# Patient Record
Sex: FEMALE | Race: WHITE | NOT HISPANIC OR LATINO | Employment: OTHER | URBAN - METROPOLITAN AREA
[De-identification: names, ages, dates, MRNs, and addresses within clinical notes are randomized per-mention and may not be internally consistent; named-entity substitution may affect disease eponyms.]

---

## 2017-01-04 ENCOUNTER — GENERIC CONVERSION - ENCOUNTER (OUTPATIENT)
Dept: OTHER | Facility: OTHER | Age: 57
End: 2017-01-04

## 2017-01-04 ENCOUNTER — ALLSCRIPTS OFFICE VISIT (OUTPATIENT)
Dept: OTHER | Facility: OTHER | Age: 57
End: 2017-01-04

## 2017-01-04 DIAGNOSIS — N93.9 ABNORMAL UTERINE AND VAGINAL BLEEDING, UNSPECIFIED: ICD-10-CM

## 2017-01-04 DIAGNOSIS — Z12.31 ENCOUNTER FOR SCREENING MAMMOGRAM FOR MALIGNANT NEOPLASM OF BREAST: ICD-10-CM

## 2017-01-05 ENCOUNTER — GENERIC CONVERSION - ENCOUNTER (OUTPATIENT)
Dept: OTHER | Facility: OTHER | Age: 57
End: 2017-01-05

## 2017-01-09 LAB
ADEQUACY: (HISTORICAL): NORMAL
CLINICIAN PROVIDIED ICD 9 OR 10 (HISTORICAL): NORMAL
COMMENT (HISTORICAL): NORMAL
DIAGNOSIS (HISTORICAL): NORMAL
NOTE: (HISTORICAL): NORMAL
PERFORMED BY (HISTORICAL): NORMAL
REFLEX (HISTORICAL): NORMAL

## 2017-01-12 ENCOUNTER — GENERIC CONVERSION - ENCOUNTER (OUTPATIENT)
Dept: OTHER | Facility: OTHER | Age: 57
End: 2017-01-12

## 2017-01-21 ENCOUNTER — HOSPITAL ENCOUNTER (EMERGENCY)
Facility: HOSPITAL | Age: 57
Discharge: HOME | End: 2017-01-21
Attending: EMERGENCY MEDICINE | Admitting: EMERGENCY MEDICINE
Payer: COMMERCIAL

## 2017-01-21 ENCOUNTER — APPOINTMENT (EMERGENCY)
Dept: RADIOLOGY | Facility: HOSPITAL | Age: 57
End: 2017-01-21
Payer: COMMERCIAL

## 2017-01-21 VITALS
SYSTOLIC BLOOD PRESSURE: 143 MMHG | RESPIRATION RATE: 16 BRPM | DIASTOLIC BLOOD PRESSURE: 79 MMHG | OXYGEN SATURATION: 97 % | HEART RATE: 74 BPM | WEIGHT: 200 LBS | TEMPERATURE: 98.7 F

## 2017-01-21 DIAGNOSIS — F41.0 PANIC ATTACK: ICD-10-CM

## 2017-01-21 DIAGNOSIS — R00.2 PALPITATIONS: Primary | ICD-10-CM

## 2017-01-21 LAB
ANION GAP SERPL CALCULATED.3IONS-SCNC: 14 MMOL/L (ref 4–13)
BASOPHILS # BLD AUTO: 0.03 THOUSANDS/ΜL (ref 0–0.1)
BASOPHILS NFR BLD AUTO: 1 % (ref 0–1)
BUN SERPL-MCNC: 12 MG/DL (ref 5–25)
CALCIUM SERPL-MCNC: 9.8 MG/DL (ref 8.3–10.1)
CHLORIDE SERPL-SCNC: 104 MMOL/L (ref 100–108)
CO2 SERPL-SCNC: 24 MMOL/L (ref 21–32)
CREAT SERPL-MCNC: 0.83 MG/DL (ref 0.6–1.3)
EOSINOPHIL # BLD AUTO: 0.06 THOUSAND/ΜL (ref 0–0.61)
EOSINOPHIL NFR BLD AUTO: 1 % (ref 0–6)
ERYTHROCYTE [DISTWIDTH] IN BLOOD BY AUTOMATED COUNT: 13.9 % (ref 11.6–15.1)
GFR SERPL CREATININE-BSD FRML MDRD: >60 ML/MIN/1.73SQ M
GLUCOSE SERPL-MCNC: 96 MG/DL (ref 65–140)
HCT VFR BLD AUTO: 42.2 % (ref 34.8–46.1)
HGB BLD-MCNC: 13.9 G/DL (ref 11.5–15.4)
LYMPHOCYTES # BLD AUTO: 2.86 THOUSANDS/ΜL (ref 0.6–4.47)
LYMPHOCYTES NFR BLD AUTO: 46 % (ref 14–44)
MCH RBC QN AUTO: 29.6 PG (ref 26.8–34.3)
MCHC RBC AUTO-ENTMCNC: 32.9 G/DL (ref 31.4–37.4)
MCV RBC AUTO: 90 FL (ref 82–98)
MONOCYTES # BLD AUTO: 0.54 THOUSAND/ΜL (ref 0.17–1.22)
MONOCYTES NFR BLD AUTO: 9 % (ref 4–12)
NEUTROPHILS # BLD AUTO: 2.62 THOUSANDS/ΜL (ref 1.85–7.62)
NEUTS SEG NFR BLD AUTO: 43 % (ref 43–75)
PLATELET # BLD AUTO: 275 THOUSANDS/UL (ref 149–390)
PMV BLD AUTO: 10.2 FL (ref 8.9–12.7)
POTASSIUM SERPL-SCNC: 3.7 MMOL/L (ref 3.5–5.3)
RBC # BLD AUTO: 4.7 MILLION/UL (ref 3.81–5.12)
SODIUM SERPL-SCNC: 142 MMOL/L (ref 136–145)
TROPONIN I SERPL-MCNC: <0.02 NG/ML
WBC # BLD AUTO: 6.11 THOUSAND/UL (ref 4.31–10.16)

## 2017-01-21 PROCEDURE — 36415 COLL VENOUS BLD VENIPUNCTURE: CPT | Performed by: EMERGENCY MEDICINE

## 2017-01-21 PROCEDURE — 84484 ASSAY OF TROPONIN QUANT: CPT | Performed by: EMERGENCY MEDICINE

## 2017-01-21 PROCEDURE — 80048 BASIC METABOLIC PNL TOTAL CA: CPT | Performed by: EMERGENCY MEDICINE

## 2017-01-21 PROCEDURE — 93005 ELECTROCARDIOGRAM TRACING: CPT | Performed by: EMERGENCY MEDICINE

## 2017-01-21 PROCEDURE — 71020 HB CHEST X-RAY 2VW FRONTAL&LATL: CPT

## 2017-01-21 PROCEDURE — 85025 COMPLETE CBC W/AUTO DIFF WBC: CPT | Performed by: EMERGENCY MEDICINE

## 2017-01-21 PROCEDURE — 99285 EMERGENCY DEPT VISIT HI MDM: CPT

## 2017-01-21 RX ORDER — ALPRAZOLAM 1 MG/1
1 TABLET ORAL
COMMUNITY
Start: 2016-06-24 | End: 2018-06-21

## 2017-01-21 RX ORDER — LEVOTHYROXINE SODIUM 0.1 MG/1
100 TABLET ORAL DAILY
COMMUNITY
End: 2018-02-16 | Stop reason: SDUPTHER

## 2017-01-23 ENCOUNTER — HOSPITAL ENCOUNTER (OUTPATIENT)
Dept: ULTRASOUND IMAGING | Facility: HOSPITAL | Age: 57
Discharge: HOME | End: 2017-01-23
Payer: COMMERCIAL

## 2017-01-23 DIAGNOSIS — N93.9 ABNORMAL UTERINE AND VAGINAL BLEEDING, UNSPECIFIED: ICD-10-CM

## 2017-01-23 LAB
ATRIAL RATE: 71 BPM
P AXIS: 39 DEGREES
PR INTERVAL: 156 MS
QRS AXIS: 85 DEGREES
QRSD INTERVAL: 96 MS
QT INTERVAL: 398 MS
QTC INTERVAL: 432 MS
T WAVE AXIS: 70 DEGREES
VENTRICULAR RATE: 71 BPM

## 2017-01-23 PROCEDURE — 76830 TRANSVAGINAL US NON-OB: CPT

## 2017-01-23 PROCEDURE — 76856 US EXAM PELVIC COMPLETE: CPT

## 2017-04-26 ENCOUNTER — TRANSCRIBE ORDERS (OUTPATIENT)
Dept: ADMINISTRATIVE | Facility: HOSPITAL | Age: 57
End: 2017-04-26

## 2017-04-26 ENCOUNTER — ALLSCRIPTS OFFICE VISIT (OUTPATIENT)
Dept: OTHER | Facility: OTHER | Age: 57
End: 2017-04-26

## 2017-04-26 DIAGNOSIS — R94.31 ABNORMAL ELECTROCARDIOGRAM: ICD-10-CM

## 2017-04-26 DIAGNOSIS — R94.31 NONSPECIFIC ABNORMAL ELECTROCARDIOGRAM (ECG) (EKG): Primary | ICD-10-CM

## 2017-05-17 ENCOUNTER — HOSPITAL ENCOUNTER (OUTPATIENT)
Dept: NON INVASIVE DIAGNOSTICS | Facility: CLINIC | Age: 57
Discharge: HOME | End: 2017-05-17
Payer: COMMERCIAL

## 2017-05-17 DIAGNOSIS — R94.31 ABNORMAL ELECTROCARDIOGRAM: ICD-10-CM

## 2017-05-17 DIAGNOSIS — R94.31 NONSPECIFIC ABNORMAL ELECTROCARDIOGRAM (ECG) (EKG): ICD-10-CM

## 2017-05-17 PROCEDURE — 93306 TTE W/DOPPLER COMPLETE: CPT

## 2017-05-17 PROCEDURE — 93226 XTRNL ECG REC<48 HR SCAN A/R: CPT

## 2017-05-17 PROCEDURE — 93225 XTRNL ECG REC<48 HRS REC: CPT

## 2017-05-17 PROCEDURE — 93017 CV STRESS TEST TRACING ONLY: CPT

## 2017-05-22 LAB
CHEST PAIN STATEMENT: NORMAL
MAX DIASTOLIC BP: 78 MMHG
MAX HEART RATE: 155 BPM
MAX PREDICTED HEART RATE: 165 BPM
MAX. SYSTOLIC BP: 157 MMHG
PROTOCOL NAME: NORMAL
REASON FOR TERMINATION: NORMAL
TARGET HR FORMULA: NORMAL
TEST INDICATION: NORMAL
TIME IN EXERCISE PHASE: 609 S

## 2018-01-13 NOTE — RESULT NOTES
Message   pt  aware of all results at [facility], copy of labs given     Verified Results  (1) LIPID PANEL FASTING W DIRECT LDL REFLEX [id number] 03:46PM Lachelle Cruz     Test Name Result Flag Reference   Cholesterol, Total 239 mg/dL H 100-199   Triglycerides 206 mg/dL H 0-149   HDL Cholesterol 44 mg/dL  >39   According to ATP-III Guidelines, HDL-C >59 mg/dL is considered a  negative risk factor for CHD     LDL Cholesterol Calc 154 mg/dL H 0-99     (1) COMPREHENSIVE METABOLIC PANEL [id number] 69:69QH [employer]     Test Name Result Flag Reference   Glucose, Serum 105 mg/dL H 65-99   BUN 11 mg/dL  6-24   Creatinine, Serum 0 73 mg/dL  0 57-1 00   eGFR If NonAfricn Am 94 mL/min/1 73  >59   eGFR If Africn Am 108 mL/min/1 73  >59   BUN/Creatinine Ratio 15  9-23   Sodium, Serum 142 mmol/L  134-144   Potassium, Serum 4 0 mmol/L  3 5-5 2   Chloride, Serum 103 mmol/L     Carbon Dioxide, Total 24 mmol/L  18-29   Calcium, Serum 9 7 mg/dL  8 7-10 2   Protein, Total, Serum 7 2 g/dL  6 0-8 5   Albumin, Serum 4 3 g/dL  3 5-5 5   Globulin, Total 2 9 g/dL  1 5-4 5   A/G Ratio 1 5  1 1-2 5   Bilirubin, Total 0 9 mg/dL  0 0-1 2   Alkaline Phosphatase, S 105 IU/L     AST (SGOT) 22 IU/L  0-40   ALT (SGPT) 22 IU/L  0-32     (1) TSH [id number] 03:46PM Cathy Rincon     Test Name Result Flag Reference   TSH 0 181 uIU/mL L 0 450-4 500

## 2018-01-14 VITALS
HEIGHT: 64 IN | HEART RATE: 82 BPM | OXYGEN SATURATION: 98 % | BODY MASS INDEX: 35.51 KG/M2 | DIASTOLIC BLOOD PRESSURE: 84 MMHG | WEIGHT: 208 LBS | SYSTOLIC BLOOD PRESSURE: 118 MMHG

## 2018-01-14 VITALS
SYSTOLIC BLOOD PRESSURE: 110 MMHG | HEART RATE: 90 BPM | DIASTOLIC BLOOD PRESSURE: 80 MMHG | WEIGHT: 208 LBS | BODY MASS INDEX: 35.51 KG/M2 | HEIGHT: 64 IN | RESPIRATION RATE: 16 BRPM

## 2018-01-16 NOTE — RESULT NOTES
Message   labs abnormal, should make OV to discuss  blood sugar higher than last year, 3 month average blood sugar is prediabetes range   TSH is high at 5, needs medication adjusted     Verified Results  [facility]) CMP14+eGFR [id number] 02:58PM [employer]     Test Name Result Flag Reference   Glucose, Serum 132 mg/dL H 65-99   BUN 12 mg/dL  6-24   Creatinine, Serum 0 91 mg/dL  0 57-1 00   eGFR If NonAfricn Am 71 mL/min/1 73  >59   eGFR If Africn Am 82 mL/min/1 73  >59   BUN/Creatinine Ratio 13  9-23   Sodium, Serum 141 mmol/L  134-144   Potassium, Serum 4 0 mmol/L  3 5-5 2   Chloride, Serum 100 mmol/L     Carbon Dioxide, Total 21 mmol/L  18-29   Calcium, Serum 9 7 mg/dL  8 7-10 2   Protein, Total, Serum 7 6 g/dL  6 0-8 5   Albumin, Serum 4 7 g/dL  3 5-5 5   Globulin, Total 2 9 g/dL  1 5-4 5   A/G Ratio 1 6  1 1-2 5   Bilirubin, Total 0 8 mg/dL  0 0-1 2   Alkaline Phosphatase, S 100 IU/L     AST (SGOT) 30 IU/L  0-40   ALT (SGPT) 32 IU/L  0-32     (LC) Lipid Panel [id number] 02:58PM [employer]     Test Name Result Flag Reference   Cholesterol, Total 237 mg/dL H 100-199   Triglycerides 210 mg/dL H 0-149   HDL Cholesterol 49 mg/dL  >39   VLDL Cholesterol Ifeanyi 42 mg/dL H 5-40   LDL Cholesterol Calc 146 mg/dL H 0-99     (LC) Thyroid Panel With TSH [id number] 02:58PM [employer]     Test Name Result Flag Reference   TSH 5 080 uIU/mL H 0 450-4 500   Thyroxine (T4) 8 5 ug/dL  4 5-12 0   T3 Uptake 24 %  24-39   Free Thyroxine Index 2 0  1 2-4 9     (1) HEMOGLOBIN A1C [id number] 02:58PM Cathy Rincon     Test Name Result Flag Reference   Hemoglobin A1c 6 1 % H 4 8-5 6   Pre-diabetes: 5 7 - 6 4           Diabetes: >6 4           Glycemic control for adults with diabetes: <7 0

## 2018-01-17 NOTE — RESULT NOTES
Message   normal pap smear     Verified Results  ([facility]) Pap IG, rfx HPV ASCU [id number] 12:00AM [employer]     Test Name Result Flag Reference   DIAGNOSIS: Comment     NEGATIVE FOR INTRAEPITHELIAL LESION AND MALIGNANCY  THE CYTOLOGY PROCESSING WAS PERFORMED AT THE [facility] LOCATED AT  [facility], [address] [address]  Specimen adequacy: Comment     Satisfactory for evaluation  Endocervical and/or squamous metaplastic  cells (endocervical component) are present  Clinician provided ICD10: Comment     [id number]   Performed by: Marcio Gonzalez, Cytotechnologist (ASCP)     Clarisse Olmedo Note: Comment     The Pap smear is a screening test designed to aid in the detection of  premalignant and malignant conditions of the uterine cervix  It is not a  diagnostic procedure and should not be used as the sole means of detecting  cervical cancer  Both false-positive and false-negative reports do occur    Comment     The HPV DNA reflex criteria were not met with this specimen result  therefore, no HPV testing was performed

## 2018-02-09 ENCOUNTER — OFFICE VISIT (OUTPATIENT)
Dept: FAMILY MEDICINE CLINIC | Facility: CLINIC | Age: 58
End: 2018-02-09
Payer: COMMERCIAL

## 2018-02-09 VITALS
OXYGEN SATURATION: 95 % | TEMPERATURE: 97.9 F | SYSTOLIC BLOOD PRESSURE: 122 MMHG | WEIGHT: 200 LBS | BODY MASS INDEX: 34.15 KG/M2 | HEART RATE: 60 BPM | DIASTOLIC BLOOD PRESSURE: 72 MMHG | HEIGHT: 64 IN

## 2018-02-09 DIAGNOSIS — E03.9 HYPOTHYROIDISM, UNSPECIFIED TYPE: ICD-10-CM

## 2018-02-09 DIAGNOSIS — J01.00 ACUTE MAXILLARY SINUSITIS, RECURRENCE NOT SPECIFIED: Primary | ICD-10-CM

## 2018-02-09 PROCEDURE — 99213 OFFICE O/P EST LOW 20 MIN: CPT | Performed by: NURSE PRACTITIONER

## 2018-02-09 RX ORDER — AMOXICILLIN AND CLAVULANATE POTASSIUM 875; 125 MG/1; MG/1
1 TABLET, FILM COATED ORAL EVERY 12 HOURS SCHEDULED
Qty: 20 TABLET | Refills: 0 | Status: SHIPPED | OUTPATIENT
Start: 2018-02-09 | End: 2018-02-19

## 2018-02-09 NOTE — PROGRESS NOTES
Assessment/Plan:    No problem-specific Assessment & Plan notes found for this encounter  Diagnoses and all orders for this visit:    Acute maxillary sinusitis, recurrence not specified  -     amoxicillin-clavulanate (AUGMENTIN) 875-125 mg per tablet; Take 1 tablet by mouth every 12 (twelve) hours for 10 days    Hypothyroidism, unspecified type  -     TSH, 3rd generation with T4 reflex; Future  -     Lipid panel; Future  -     Comprehensive metabolic panel; Future  -     TSH, 3rd generation with T4 reflex  -     Lipid panel  -     Comprehensive metabolic panel        Patient Instructions   Discussed viral vs bacterial infection  RX medication as directed  Continue OTC cough/cold medications as directed  Call or return for problems/concerns            Subjective:      Patient ID: Joaquina Watkins is a 65 y o  female  Sinus congestion, fever 102, cough, wheezing x 2 week  The following portions of the patient's history were reviewed and updated as appropriate: allergies, current medications, past family history, past medical history, past social history, past surgical history and problem list     Review of Systems   Constitutional: Positive for activity change, chills, fatigue and fever  HENT: Positive for congestion, ear pain, postnasal drip, rhinorrhea, sinus pressure and sore throat  Eyes: Negative for pain, discharge and redness  Respiratory: Positive for cough  Negative for wheezing  Cardiovascular: Negative for chest pain  Gastrointestinal: Negative for constipation, diarrhea, nausea and vomiting  Musculoskeletal: Positive for myalgias  Skin: Negative for rash  Neurological: Positive for headaches  Negative for dizziness  Objective:    Vitals:    02/09/18 1110   BP: 122/72   Pulse: 60   Temp: 97 9 °F (36 6 °C)   SpO2: 95%        Physical Exam   Constitutional: She is oriented to person, place, and time  She appears well-developed and well-nourished  No distress     HENT: Head: Normocephalic and atraumatic  Right Ear: Tympanic membrane, external ear and ear canal normal    Left Ear: Tympanic membrane, external ear and ear canal normal    Nose: Rhinorrhea present  No epistaxis  Mouth/Throat: Uvula is midline, oropharynx is clear and moist and mucous membranes are normal    Cardiovascular: Normal rate, regular rhythm and normal heart sounds  Exam reveals no gallop and no friction rub  No murmur heard  Pulmonary/Chest: Effort normal and breath sounds normal  No respiratory distress  She has no wheezes  She has no rales  Cough noted   Abdominal: She exhibits no distension  There is no tenderness  Lymphadenopathy:        Head (right side): No submandibular adenopathy present  Head (left side): No submandibular adenopathy present  Neurological: She is alert and oriented to person, place, and time  Skin: She is not diaphoretic  Psychiatric: She has a normal mood and affect  Her behavior is normal  Judgment and thought content normal    Nursing note and vitals reviewed

## 2018-02-10 LAB
ALBUMIN SERPL-MCNC: 4.5 G/DL (ref 3.5–5.5)
ALBUMIN/GLOB SERPL: 1.6 {RATIO} (ref 1.2–2.2)
ALP SERPL-CCNC: 83 IU/L (ref 39–117)
ALT SERPL-CCNC: 35 IU/L (ref 0–32)
AST SERPL-CCNC: 32 IU/L (ref 0–40)
BILIRUB SERPL-MCNC: 0.6 MG/DL (ref 0–1.2)
BUN SERPL-MCNC: 10 MG/DL (ref 6–24)
BUN/CREAT SERPL: 13 (ref 9–23)
CALCIUM SERPL-MCNC: 9.7 MG/DL (ref 8.7–10.2)
CHLORIDE SERPL-SCNC: 101 MMOL/L (ref 96–106)
CHOLEST SERPL-MCNC: 247 MG/DL (ref 100–199)
CHOLEST/HDLC SERPL: 7.5 RATIO UNITS (ref 0–4.4)
CO2 SERPL-SCNC: 26 MMOL/L (ref 18–29)
CREAT SERPL-MCNC: 0.78 MG/DL (ref 0.57–1)
GLOBULIN SER-MCNC: 2.8 G/DL (ref 1.5–4.5)
GLUCOSE SERPL-MCNC: 108 MG/DL (ref 65–99)
HDLC SERPL-MCNC: 33 MG/DL
LDLC SERPL CALC-MCNC: 168 MG/DL (ref 0–99)
POTASSIUM SERPL-SCNC: 4.4 MMOL/L (ref 3.5–5.2)
PROT SERPL-MCNC: 7.3 G/DL (ref 6–8.5)
SL AMB EGFR AFRICAN AMERICAN: 98 ML/MIN/1.73
SL AMB EGFR NON AFRICAN AMERICAN: 85 ML/MIN/1.73
SL AMB VLDL CHOLESTEROL CALC: 46 MG/DL (ref 5–40)
SODIUM SERPL-SCNC: 143 MMOL/L (ref 134–144)
TRIGL SERPL-MCNC: 228 MG/DL (ref 0–149)
TSH SERPL DL<=0.005 MIU/L-ACNC: 0.52 UIU/ML (ref 0.45–4.5)

## 2018-02-12 ENCOUNTER — TELEPHONE (OUTPATIENT)
Dept: FAMILY MEDICINE CLINIC | Facility: CLINIC | Age: 58
End: 2018-02-12

## 2018-02-16 ENCOUNTER — OFFICE VISIT (OUTPATIENT)
Dept: FAMILY MEDICINE CLINIC | Facility: CLINIC | Age: 58
End: 2018-02-16
Payer: COMMERCIAL

## 2018-02-16 VITALS
BODY MASS INDEX: 34.49 KG/M2 | HEIGHT: 64 IN | WEIGHT: 202 LBS | SYSTOLIC BLOOD PRESSURE: 122 MMHG | DIASTOLIC BLOOD PRESSURE: 80 MMHG

## 2018-02-16 DIAGNOSIS — E03.9 HYPOTHYROIDISM, UNSPECIFIED TYPE: ICD-10-CM

## 2018-02-16 DIAGNOSIS — E03.9 HYPOTHYROIDISM, UNSPECIFIED TYPE: Primary | ICD-10-CM

## 2018-02-16 DIAGNOSIS — Z00.00 ANNUAL PHYSICAL EXAM: Primary | ICD-10-CM

## 2018-02-16 DIAGNOSIS — E78.00 HYPERCHOLESTEREMIA: ICD-10-CM

## 2018-02-16 DIAGNOSIS — Z12.39 ENCOUNTER FOR BREAST CANCER SCREENING OTHER THAN MAMMOGRAM: ICD-10-CM

## 2018-02-16 PROCEDURE — 99396 PREV VISIT EST AGE 40-64: CPT | Performed by: NURSE PRACTITIONER

## 2018-02-16 RX ORDER — LEVOTHYROXINE SODIUM 0.1 MG/1
100 TABLET ORAL DAILY
Qty: 90 TABLET | Refills: 1 | Status: SHIPPED | OUTPATIENT
Start: 2018-02-16 | End: 2018-06-21

## 2018-02-16 NOTE — PROGRESS NOTES
Assessment/Plan:    No problem-specific Assessment & Plan notes found for this encounter  Diagnoses and all orders for this visit:    Hypothyroidism, unspecified type    Hypercholesteremia    Encounter for breast cancer screening other than mammogram  -     Mammo screening bilateral w cad; Future        Patient Instructions   Refusing cholesterol medication  Discussed diet to lower cholesterol  Have labs rechecked in 3 months  Script for mammogram given  Call or return with any problems or concerns  Subjective:      Patient ID: Lisette Bess is a 65 y o  female  Here for annual physical and to review labs  The following portions of the patient's history were reviewed and updated as appropriate: allergies, current medications, past family history, past medical history, past social history, past surgical history and problem list     Review of Systems   Constitutional: Negative for activity change, diaphoresis, fatigue and fever  HENT: Negative for congestion, facial swelling, hearing loss, rhinorrhea, sinus pain, sinus pressure, sneezing, sore throat and voice change  Eyes: Negative for discharge and visual disturbance  Respiratory: Negative for cough, choking, chest tightness, shortness of breath, wheezing and stridor  Cardiovascular: Negative for chest pain, palpitations and leg swelling  Gastrointestinal: Negative for abdominal distention, abdominal pain, constipation, diarrhea, nausea and vomiting  Endocrine: Negative for polydipsia, polyphagia and polyuria  Genitourinary: Negative for difficulty urinating, dysuria, frequency and urgency  Musculoskeletal: Negative for arthralgias, back pain, gait problem, joint swelling, myalgias, neck pain and neck stiffness  Skin: Negative for color change, rash and wound  Neurological: Negative for dizziness, syncope, speech difficulty, weakness, light-headedness and headaches  Hematological: Negative for adenopathy   Does not bruise/bleed easily  Psychiatric/Behavioral: Negative for agitation, behavioral problems, confusion, hallucinations, sleep disturbance and suicidal ideas  The patient is not nervous/anxious  Objective:      /80   Ht 5' 4" (1 626 m)   Wt 91 6 kg (202 lb)   BMI 34 67 kg/m²          Physical Exam   Constitutional: She is oriented to person, place, and time  She appears well-developed and well-nourished  No distress  HENT:   Head: Normocephalic and atraumatic  Right Ear: External ear normal    Left Ear: External ear normal    Nose: Nose normal    Mouth/Throat: Oropharynx is clear and moist    Eyes: Conjunctivae and EOM are normal  Pupils are equal, round, and reactive to light  Right eye exhibits no discharge  Neck: Normal range of motion  Neck supple  No tracheal deviation present  No thyromegaly present  Cardiovascular: Normal rate, regular rhythm and normal heart sounds  Exam reveals no gallop and no friction rub  No murmur heard  Pulmonary/Chest: Effort normal and breath sounds normal  No respiratory distress  She has no wheezes  She has no rales  Abdominal: Soft  Bowel sounds are normal  She exhibits no distension and no mass  There is no tenderness  There is no rebound and no guarding  Musculoskeletal: Normal range of motion  She exhibits no edema, tenderness or deformity  Neurological: She is alert and oriented to person, place, and time  No cranial nerve deficit  Coordination normal    Skin: Skin is warm and dry  No rash noted  She is not diaphoretic  No erythema  Psychiatric: She has a normal mood and affect  Her behavior is normal  Judgment and thought content normal    Nursing note and vitals reviewed

## 2018-02-16 NOTE — PATIENT INSTRUCTIONS
Refusing cholesterol medication  Discussed diet to lower cholesterol  Have labs rechecked in 3 months  Script for mammogram given  Call or return with any problems or concerns

## 2018-06-21 ENCOUNTER — OFFICE VISIT (OUTPATIENT)
Dept: URGENT CARE | Facility: CLINIC | Age: 58
End: 2018-06-21
Payer: COMMERCIAL

## 2018-06-21 ENCOUNTER — APPOINTMENT (EMERGENCY)
Dept: RADIOLOGY | Facility: HOSPITAL | Age: 58
End: 2018-06-21
Payer: COMMERCIAL

## 2018-06-21 ENCOUNTER — HOSPITAL ENCOUNTER (EMERGENCY)
Facility: HOSPITAL | Age: 58
Discharge: HOME | End: 2018-06-22
Attending: EMERGENCY MEDICINE | Admitting: EMERGENCY MEDICINE
Payer: COMMERCIAL

## 2018-06-21 VITALS
HEIGHT: 64 IN | BODY MASS INDEX: 34.31 KG/M2 | HEART RATE: 97 BPM | TEMPERATURE: 98.9 F | DIASTOLIC BLOOD PRESSURE: 76 MMHG | WEIGHT: 201 LBS | OXYGEN SATURATION: 98 % | SYSTOLIC BLOOD PRESSURE: 116 MMHG | RESPIRATION RATE: 16 BRPM

## 2018-06-21 DIAGNOSIS — R07.9 CHEST PAIN: Primary | ICD-10-CM

## 2018-06-21 DIAGNOSIS — R07.9 CHEST PAIN, UNSPECIFIED TYPE: Primary | ICD-10-CM

## 2018-06-21 PROBLEM — R94.31 ABNORMAL EKG: Status: ACTIVE | Noted: 2017-04-26

## 2018-06-21 PROBLEM — N93.9 ABNORMAL VAGINAL BLEEDING: Status: ACTIVE | Noted: 2017-01-04

## 2018-06-21 PROBLEM — R73.03 PRE-DIABETES: Status: ACTIVE | Noted: 2017-01-04

## 2018-06-21 PROBLEM — R07.89 CHEST PAIN, NON-CARDIAC: Status: ACTIVE | Noted: 2017-01-12

## 2018-06-21 PROBLEM — R00.2 PALPITATIONS: Status: ACTIVE | Noted: 2017-04-26

## 2018-06-21 LAB
ANION GAP SERPL CALCULATED.3IONS-SCNC: 9 MMOL/L (ref 4–13)
BASOPHILS # BLD AUTO: 0.04 THOUSANDS/ΜL (ref 0–0.1)
BASOPHILS NFR BLD AUTO: 1 % (ref 0–1)
BUN SERPL-MCNC: 11 MG/DL (ref 5–25)
CALCIUM SERPL-MCNC: 9 MG/DL (ref 8.3–10.1)
CHLORIDE SERPL-SCNC: 103 MMOL/L (ref 100–108)
CO2 SERPL-SCNC: 28 MMOL/L (ref 21–32)
CREAT SERPL-MCNC: 0.78 MG/DL (ref 0.6–1.3)
EOSINOPHIL # BLD AUTO: 0.04 THOUSAND/ΜL (ref 0–0.61)
EOSINOPHIL NFR BLD AUTO: 1 % (ref 0–6)
ERYTHROCYTE [DISTWIDTH] IN BLOOD BY AUTOMATED COUNT: 13.1 % (ref 11.6–15.1)
GFR SERPL CREATININE-BSD FRML MDRD: 85 ML/MIN/1.73SQ M
GLUCOSE SERPL-MCNC: 93 MG/DL (ref 65–140)
HCT VFR BLD AUTO: 41.2 % (ref 34.8–46.1)
HGB BLD-MCNC: 13.7 G/DL (ref 11.5–15.4)
IMM GRANULOCYTES # BLD AUTO: 0.02 THOUSAND/UL (ref 0–0.2)
IMM GRANULOCYTES NFR BLD AUTO: 0 % (ref 0–2)
LYMPHOCYTES # BLD AUTO: 2.82 THOUSANDS/ΜL (ref 0.6–4.47)
LYMPHOCYTES NFR BLD AUTO: 42 % (ref 14–44)
MAGNESIUM SERPL-MCNC: 2.3 MG/DL (ref 1.6–2.6)
MCH RBC QN AUTO: 29.9 PG (ref 26.8–34.3)
MCHC RBC AUTO-ENTMCNC: 33.3 G/DL (ref 31.4–37.4)
MCV RBC AUTO: 90 FL (ref 82–98)
MONOCYTES # BLD AUTO: 0.72 THOUSAND/ΜL (ref 0.17–1.22)
MONOCYTES NFR BLD AUTO: 11 % (ref 4–12)
NEUTROPHILS # BLD AUTO: 3.05 THOUSANDS/ΜL (ref 1.85–7.62)
NEUTS SEG NFR BLD AUTO: 45 % (ref 43–75)
NRBC BLD AUTO-RTO: 0 /100 WBCS
PLATELET # BLD AUTO: 340 THOUSANDS/UL (ref 149–390)
PMV BLD AUTO: 10.1 FL (ref 8.9–12.7)
POTASSIUM SERPL-SCNC: 3.8 MMOL/L (ref 3.5–5.3)
RBC # BLD AUTO: 4.58 MILLION/UL (ref 3.81–5.12)
SODIUM SERPL-SCNC: 140 MMOL/L (ref 136–145)
TROPONIN I SERPL-MCNC: <0.02 NG/ML
WBC # BLD AUTO: 6.69 THOUSAND/UL (ref 4.31–10.16)

## 2018-06-21 PROCEDURE — 93010 ELECTROCARDIOGRAM REPORT: CPT | Performed by: INTERNAL MEDICINE

## 2018-06-21 PROCEDURE — 84484 ASSAY OF TROPONIN QUANT: CPT | Performed by: EMERGENCY MEDICINE

## 2018-06-21 PROCEDURE — 99213 OFFICE O/P EST LOW 20 MIN: CPT | Performed by: NURSE PRACTITIONER

## 2018-06-21 PROCEDURE — 80048 BASIC METABOLIC PNL TOTAL CA: CPT | Performed by: EMERGENCY MEDICINE

## 2018-06-21 PROCEDURE — 36415 COLL VENOUS BLD VENIPUNCTURE: CPT | Performed by: EMERGENCY MEDICINE

## 2018-06-21 PROCEDURE — 71045 X-RAY EXAM CHEST 1 VIEW: CPT

## 2018-06-21 PROCEDURE — 85025 COMPLETE CBC W/AUTO DIFF WBC: CPT | Performed by: EMERGENCY MEDICINE

## 2018-06-21 PROCEDURE — 93005 ELECTROCARDIOGRAM TRACING: CPT | Performed by: NURSE PRACTITIONER

## 2018-06-21 PROCEDURE — 96360 HYDRATION IV INFUSION INIT: CPT

## 2018-06-21 PROCEDURE — 96361 HYDRATE IV INFUSION ADD-ON: CPT

## 2018-06-21 PROCEDURE — 83735 ASSAY OF MAGNESIUM: CPT | Performed by: EMERGENCY MEDICINE

## 2018-06-21 RX ORDER — LEVOTHYROXINE SODIUM 0.1 MG/1
1 TABLET ORAL DAILY
COMMUNITY
Start: 2016-06-24 | End: 2018-08-15 | Stop reason: SDUPTHER

## 2018-06-21 RX ORDER — 0.9 % SODIUM CHLORIDE 0.9 %
3 VIAL (ML) INJECTION AS NEEDED
Status: DISCONTINUED | OUTPATIENT
Start: 2018-06-21 | End: 2018-06-22 | Stop reason: HOSPADM

## 2018-06-21 RX ORDER — UBIDECARENONE 75 MG
100 CAPSULE ORAL DAILY
COMMUNITY

## 2018-06-21 RX ORDER — ASPIRIN 81 MG/1
324 TABLET, CHEWABLE ORAL ONCE
Status: COMPLETED | OUTPATIENT
Start: 2018-06-21 | End: 2018-06-21

## 2018-06-21 RX ADMIN — SODIUM CHLORIDE 1000 ML: 0.9 INJECTION, SOLUTION INTRAVENOUS at 20:45

## 2018-06-21 RX ADMIN — ASPIRIN 81 MG 324 MG: 81 TABLET ORAL at 20:45

## 2018-06-21 NOTE — PROGRESS NOTES
NAME: Sandi De Leon is a 65 y o  female  : 1960    MRN: [id number]      Assessment and Plan   Chest pain, unspecified type [R07 9]  1  Chest pain, unspecified type  POCT ECG       Pascale was seen today for chest pain  Diagnoses and all orders for this visit:    Chest pain, unspecified type  -     POCT ECG    ambulance refused,   Pt left with  to [facility] ER   Patient Instructions   Patient Instructions   Patient going to the ER for further evaluation   Multiple symptoms   [facility] ER, private vehicle and refused ambulance,  took pt to the ER  EKG done  ER made aware pt coming  Proceed to ER if symptoms worsen  Chief Complaint     Chief Complaint   Patient presents with    Chest Pain     last week started  Light headed and confused  Started on beet pills  Vomited once today  Pain on right side of neck  History of Present Illness     Pt here today for chest tightness for one week and thinks she may have angina, and was told to take beet pills to help with her symptoms  [facility] gave her the meds and the chest tightness subsided and today when she was driving she felt dizzy and exhausted and felt nauseated and vomited at approximately 3pm  She feels at times it takes a while to get her thoughts together  She is eating and drinking normally and doesn't feel dehydrated and pain was also in the left arm since last week  Today she had pain on the right side of her Jaw and comes and goes  Patient also has a headache and feels tight in the head  She has had jaw pain for the past few hours and comes and goes   Shaina Rubio is going to take the patient to the ER        Review of Systems   Review of Systems   Constitutional: Negative  HENT: Negative  Respiratory: Negative  Cardiovascular: Positive for chest pain  Gastrointestinal: Negative  Genitourinary: Negative  Musculoskeletal: Positive for myalgias  Skin: Negative            Current Medications       Current Outpatient Prescriptions:     cyanocobalamin (VITAMIN B-12) 100 mcg tablet, Take by mouth, Disp: , Rfl:     levothyroxine 100 mcg tablet, Take 1 tablet (100 mcg total) by mouth daily, Disp: 90 tablet, Rfl: 1    Magnesium Oxide 140 MG CAPS, Take by mouth, Disp: , Rfl:     Probiotic Product (PROBIOTIC-10) CAPS, Take by mouth, Disp: , Rfl:     ALPRAZolam (XANAX) 1 mg tablet, Take 1 mg by mouth Pt states she has it but does not take it  , Disp: , Rfl:     levothyroxine 100 mcg tablet, Take 1 tablet by mouth daily, Disp: , Rfl:     Current Allergies     Allergies as of 06/21/2018 - Reviewed 06/21/2018   Allergen Reaction Noted    Sulfa antibiotics Anaphylaxis 06/24/2016              Past Medical History:   Diagnosis Date    Anxiety     Disease of thyroid gland        No past surgical history on file  Family History   Problem Relation Age of Onset    Heart failure Mother     Diabetes Mother     Cancer Father     Pancreatic cancer Father     No Known Problems Maternal Grandmother     No Known Problems Maternal Grandfather     No Known Problems Paternal Grandmother     No Known Problems Paternal Grandfather     Ovarian cancer Sister          Medications have been verified  The following portions of the patient's history were reviewed and updated as appropriate: allergies, current medications, past family history, past medical history, past social history, past surgical history and problem list     Objective   /76   Pulse 97   Temp 98 9 °F (37 2 °C)   Resp 16   Ht 5' 4" (1 626 m)   Wt 91 2 kg (201 lb)   SpO2 98%   BMI 34 50 kg/m²      Physical Exam     Physical Exam   Constitutional: She is oriented to person, place, and time  She appears well-developed and well-nourished  HENT:   Head: Normocephalic  Cardiovascular: Normal rate and regular rhythm      Pulmonary/Chest: Effort normal and breath sounds normal    cP non reproducible    Abdominal:   SNT, BS present, nauseated and vomitied times 1 at 3pm   Neurological: She is alert and oriented to person, place, and time     Feels slow to process thoughts, Headache present         BRIDEGR Lao

## 2018-06-21 NOTE — PATIENT INSTRUCTIONS
Patient going to the ER for further evaluation   Multiple symptoms   [facility] ER, private vehicle and refused ambulance,  took pt to the ER  EKG done  ER made aware pt coming

## 2018-06-22 VITALS
TEMPERATURE: 98.1 F | SYSTOLIC BLOOD PRESSURE: 107 MMHG | RESPIRATION RATE: 19 BRPM | HEART RATE: 69 BPM | OXYGEN SATURATION: 93 % | BODY MASS INDEX: 34.33 KG/M2 | DIASTOLIC BLOOD PRESSURE: 64 MMHG | WEIGHT: 200 LBS

## 2018-06-22 LAB
ATRIAL RATE: 68 BPM
ATRIAL RATE: 75 BPM
ATRIAL RATE: 84 BPM
P AXIS: 33 DEGREES
P AXIS: 37 DEGREES
P AXIS: 48 DEGREES
PR INTERVAL: 156 MS
PR INTERVAL: 158 MS
PR INTERVAL: 172 MS
QRS AXIS: 20 DEGREES
QRS AXIS: 67 DEGREES
QRS AXIS: 72 DEGREES
QRSD INTERVAL: 92 MS
QRSD INTERVAL: 94 MS
QRSD INTERVAL: 94 MS
QT INTERVAL: 384 MS
QT INTERVAL: 402 MS
QT INTERVAL: 406 MS
QTC INTERVAL: 427 MS
QTC INTERVAL: 453 MS
QTC INTERVAL: 453 MS
T WAVE AXIS: 11 DEGREES
T WAVE AXIS: 13 DEGREES
T WAVE AXIS: 22 DEGREES
TROPONIN I SERPL-MCNC: <0.02 NG/ML
VENTRICULAR RATE: 68 BPM
VENTRICULAR RATE: 75 BPM
VENTRICULAR RATE: 84 BPM

## 2018-06-22 PROCEDURE — 99284 EMERGENCY DEPT VISIT MOD MDM: CPT

## 2018-06-22 NOTE — ED NOTES
Provider informed about troponin results  Pt informed as well       Jayne Hastings RN  06/22/18 8230

## 2018-06-22 NOTE — ED NOTES
IV fluids remain running  Pt pleasant upon approach  Pt comfortable and denies pain at this time  VSS  Call bell within reach       Tomás Vilchis RN  06/21/18 5766

## 2018-06-22 NOTE — ED NOTES
Pt pleasant  Pt aware of plan of care  Waiting for troponin result  VSS stable  No complaints       Tomás Vilchis RN  06/22/18 3414

## 2018-06-22 NOTE — ED NOTES
Patient states, "I feel much better now" Patient watching  Videos on phone in bed, Daquan Leonard, RN  06/21/18 4111

## 2018-06-22 NOTE — ED PROVIDER NOTES
History  Chief Complaint   Patient presents with    Dizziness     Developed dizziness and nausea while driving; vomited once  Went to Urgent Care and was sent to ED  Has right sided neck pain also; feels very tired  States had chest pain last week but did not see doctor  States went to [employer] [facility] and was advised to take red beet pills for angina  Patient complains of sternal chest pain tight intermittent since she was 30years old  Usually symptoms happen after drinking vodka with orange juice and last a few minutes resolve on its own  She had symptoms today while driving several hours ago after drinking green smoothie  Then she got lightheaded and nausea/ vomited once  She went to urgent care and they referred her here  Patient has right anterior neck pain now  Poor historian  Reviewed records - normal stress, echo and holter monitor 5/17  Prior to Admission Medications   Prescriptions Last Dose Informant Patient Reported? Taking? Magnesium Oxide 140 MG CAPS   Yes No   Sig: Take 140 mg by mouth daily     Probiotic Product (PROBIOTIC-10) CAPS   Yes No   Sig: Take 1 tablet by mouth daily     cyanocobalamin (VITAMIN B-12) 100 mcg tablet   Yes No   Sig: Take 100 mcg by mouth daily     levothyroxine 100 mcg tablet   Yes No   Sig: Take 1 tablet by mouth daily      Facility-Administered Medications: None       Past Medical History:   Diagnosis Date    Anxiety     Disease of thyroid gland        History reviewed  No pertinent surgical history  Family History   Problem Relation Age of Onset    Heart failure Mother     Diabetes Mother     Cancer Father     Pancreatic cancer Father     No Known Problems Maternal Grandmother     No Known Problems Maternal Grandfather     No Known Problems Paternal Grandmother     No Known Problems Paternal Grandfather     Ovarian cancer Sister      I have reviewed and agree with the history as documented      Social History   Substance Use Topics    Smoking status: Never Smoker    Smokeless tobacco: Never Used    Alcohol use Yes      Comment: socially        Review of Systems   All other systems reviewed and are negative  Physical Exam  Physical Exam   Constitutional: She is oriented to person, place, and time  She appears well-developed and well-nourished  HENT:   Mouth/Throat: Oropharynx is clear and moist    Eyes: Conjunctivae and EOM are normal  Pupils are equal, round, and reactive to light  Neck: Normal range of motion  Neck supple  No spinous process tenderness present  Cardiovascular: Normal rate, regular rhythm, normal heart sounds and intact distal pulses  Pulmonary/Chest: Effort normal and breath sounds normal  No respiratory distress  She has no wheezes  She exhibits no tenderness  Abdominal: Soft  Bowel sounds are normal  She exhibits no distension  There is no tenderness  Musculoskeletal: Normal range of motion  Neurological: She is alert and oriented to person, place, and time  She has normal strength  No sensory deficit  GCS eye subscore is 4  GCS verbal subscore is 5  GCS motor subscore is 6  Skin: Skin is warm and dry  No rash noted  Psychiatric: She has a normal mood and affect  Nursing note and vitals reviewed        Vital Signs  ED Triage Vitals [06/21/18 2009]   Temperature Pulse Respirations Blood Pressure SpO2   98 1 °F (36 7 °C) 73 16 133/72 97 %      Temp src Heart Rate Source Patient Position - Orthostatic VS BP Location FiO2 (%)   -- Monitor -- Right arm --      Pain Score       4           Vitals:    06/22/18 0015 06/22/18 0030 06/22/18 0045 06/22/18 0100   BP: 127/58 106/51 115/78 107/64   Pulse: 72 68 69 69       Visual Acuity  Visual Acuity      Most Recent Value   L Pupil Size (mm)  3   R Pupil Size (mm)  3          ED Medications  Medications   aspirin chewable tablet 324 mg (324 mg Oral Given 6/21/18 2045)   sodium chloride 0 9 % bolus 1,000 mL (0 mL Intravenous Stopped 6/21/18 2332) Diagnostic Studies  Results Reviewed     Procedure Component Value Units Date/Time    Troponin I [[id number]]  (Normal) Collected:  06/21/18 2333    Lab Status:  Final result Specimen:  Blood from Arm, Left Updated:  06/22/18 0055     Troponin I <0 02 ng/mL     Troponin I [[id number]]  (Normal) Collected:  06/21/18 2022    Lab Status:  Final result Specimen:  Blood from Arm, Left Updated:  06/21/18 2051     Troponin I <0 02 ng/mL     Basic metabolic panel [[id number]] Collected:  06/21/18 2022    Lab Status:  Final result Specimen:  Blood from Arm, Left Updated:  06/21/18 2043     Sodium 140 mmol/L      Potassium 3 8 mmol/L      Chloride 103 mmol/L      CO2 28 mmol/L      Anion Gap 9 mmol/L      BUN 11 mg/dL      Creatinine 0 78 mg/dL      Glucose 93 mg/dL      Calcium 9 0 mg/dL      eGFR 85 ml/min/1 73sq m     Narrative:         National Kidney Disease Education Program recommendations are as follows:  GFR calculation is accurate only with a steady state creatinine  Chronic Kidney disease less than 60 ml/min/1 73 sq  meters  Kidney failure less than 15 ml/min/1 73 sq  meters      Magnesium [[id number]]  (Normal) Collected:  06/21/18 2022    Lab Status:  Final result Specimen:  Blood from Arm, Left Updated:  06/21/18 2043     Magnesium 2 3 mg/dL     CBC and differential [[id number]] Collected:  06/21/18 2022    Lab Status:  Final result Specimen:  Blood from Arm, Left Updated:  06/21/18 2032     WBC 6 69 Thousand/uL      RBC 4 58 Million/uL      Hemoglobin 13 7 g/dL      Hematocrit 41 2 %      MCV 90 fL      MCH 29 9 pg      MCHC 33 3 g/dL      RDW 13 1 %      MPV 10 1 fL      Platelets 171 Thousands/uL      nRBC 0 /100 WBCs      Neutrophils Relative 45 %      Immat GRANS % 0 %      Lymphocytes Relative 42 %      Monocytes Relative 11 %      Eosinophils Relative 1 %      Basophils Relative 1 %      Neutrophils Absolute 3 05 Thousands/µL      Immature Grans Absolute 0 02 Thousand/uL      Lymphocytes Absolute 2 82 Thousands/µL      Monocytes Absolute 0 72 Thousand/µL      Eosinophils Absolute 0 04 Thousand/µL      Basophils Absolute 0 04 Thousands/µL                  X-ray chest 1 view portable   ED Interpretation by Amelia Al DO (06/21 2138)   No acute abnl                 Procedures  ECG 12 Lead Documentation  Date/Time: 6/21/2018 8:20 PM  Performed by: Beverly Lynn  Authorized by: Beverly Lynn     Indications / Diagnosis:  Chest pain  ECG reviewed by me, the ED Provider: yes    Patient location:  ED  Previous ECG:     Previous ECG:  Compared to current    Comparison ECG info:  1-17    Similarity:  No change  Interpretation:     Interpretation: normal    Rate:     ECG rate:  75    ECG rate assessment: normal    Rhythm:     Rhythm: sinus rhythm    Ectopy:     Ectopy: none    QRS:     QRS axis:  Normal    QRS intervals:  Normal  Conduction:     Conduction: normal    ST segments:     ST segments:  Normal  T waves:     T waves: normal      ECG 12 Lead Documentation  Date/Time: 6/22/2018 3:36 AM  Performed by: Beverly Lynn  Authorized by: Beverly Lynn     Indications / Diagnosis:  Chest pain  ECG reviewed by me, the ED Provider: yes    Patient location:  ED  Previous ECG:     Previous ECG:  Compared to current    Comparison ECG info:  3 hours prior    Similarity:  No change  Interpretation:     Interpretation: normal    Rate:     ECG rate:  68    ECG rate assessment: normal    Rhythm:     Rhythm: sinus rhythm    Ectopy:     Ectopy: none    QRS:     QRS axis:  Normal    QRS intervals:  Normal  Conduction:     Conduction: normal    ST segments:     ST segments:  Normal  T waves:     T waves: normal             Phone Contacts  ED Phone Contact    ED Course         HEART Risk Score      Most Recent Value   History  0 Filed at: 06/21/2018 2026   ECG  0 Filed at: 06/21/2018 2026   Age  1 Filed at: 06/21/2018 2026   Risk Factors  1 Filed at: 06/21/2018 2026   Troponin  0 Filed at: 06/21/2018 2026   Heart Score Risk Calculator History  0 Filed at: 06/21/2018 2026   ECG  0 Filed at: 06/21/2018 2026   Age  1 Filed at: 06/21/2018 2026   Risk Factors  1 Filed at: 06/21/2018 2026   Troponin  0 Filed at: 06/21/2018 2026   HEART Score  2 Filed at: 06/21/2018 2026   HEART Score  2 Filed at: 06/21/2018 2026                            MDM  Number of Diagnoses or Management Options  Chest pain: new and requires workup     Amount and/or Complexity of Data Reviewed  Clinical lab tests: ordered and reviewed  Tests in the radiology section of CPT®: ordered and reviewed    Patient Progress  Patient progress: improved    CritCare Time    Disposition  Final diagnoses:   Chest pain     Time reflects when diagnosis was documented in both MDM as applicable and the Disposition within this note     Time User Action Codes Description Comment    6/22/2018  1:04 AM Andrés Valdes [R07 9] Chest pain       ED Disposition     ED Disposition Condition Comment    Discharge  Carolina Hurtado discharge to home/self care  Condition at discharge: Good        Follow-up Information     Follow up With Specialties Details Why Chrystal Norman MD Cardiology, Multidisciplinary In 4 days  [address] [place] [address] Drive [address] [place] [phone]            Discharge Medication List as of 6/22/2018  1:05 AM      CONTINUE these medications which have NOT CHANGED    Details   cyanocobalamin (VITAMIN B-12) 100 mcg tablet Take 100 mcg by mouth daily  , Historical Med      levothyroxine 100 mcg tablet Take 1 tablet by mouth daily, Starting Fri 6/24/2016, Historical Med      Magnesium Oxide 140 MG CAPS Take 140 mg by mouth daily  , Historical Med      Probiotic Product (PROBIOTIC-10) CAPS Take 1 tablet by mouth daily  , Historical Med             Outpatient Discharge Orders  Stress test only, exercise   Standing Status: Future  Standing Exp   Date: 06/22/22         ED Provider  Electronically Signed by           Therese Gaviria DO  06/22/18 0740

## 2018-06-22 NOTE — DISCHARGE INSTRUCTIONS
Chest Pain   WHAT YOU NEED TO KNOW:   Chest pain can be caused by a range of conditions, from not serious to life-threatening  Chest pain can be a symptom of a digestive problem, such as acid reflux or a stomach ulcer  An anxiety attack or a strong emotion, such as anger, can also cause chest pain  Infection, inflammation, or a fracture in the bones or cartilage in your chest can cause pain or discomfort  Sometimes chest pain or pressure is caused by poor blood flow to your heart (angina)  Chest pain may also be caused by life-threatening conditions such as a heart attack or blood clot in your lungs  DISCHARGE INSTRUCTIONS:   Call 911 if:   · You have any of the following signs of a heart attack:      ¨ Squeezing, pressure, or pain in your chest that lasts longer than 5 minutes or returns    ¨ Discomfort or pain in your back, neck, jaw, stomach, or arm     ¨ Trouble breathing    ¨ Nausea or vomiting    ¨ Lightheadedness or a sudden cold sweat, especially with chest pain or trouble breathing    Seek care immediately if:   · You have chest discomfort that gets worse, even with medicine  · You cough or vomit blood  · Your bowel movements are black or bloody  · You cannot stop vomiting, or it hurts to swallow  Contact your healthcare provider if:   · You have questions or concerns about your condition or care  Medicines:   · Medicines  may be given to treat the cause of your chest pain  Examples include pain medicine, anxiety medicine, or medicines to increase blood flow to your heart  · Do not take certain medicines without asking your healthcare provider first   These include NSAIDs, herbal or vitamin supplements, or hormones (estrogen or progestin)  · Take your medicine as directed  Contact your healthcare provider if you think your medicine is not helping or if you have side effects  Tell him or her if you are allergic to any medicine   Keep a list of the medicines, vitamins, and herbs you take  Include the amounts, and when and why you take them  Bring the list or the pill bottles to follow-up visits  Carry your medicine list with you in case of an emergency  Follow up with your healthcare provider within 72 hours, or as directed: You may need to return for more tests to find the cause of your chest pain  You may be referred to a specialist, such as a cardiologist or gastroenterologist  Write down your questions so you remember to ask them during your visits  Healthy living tips: The following are general healthy guidelines  If your chest pain is caused by a heart problem, your healthcare provider will give you specific guidelines to follow  · Do not smoke  Nicotine and other chemicals in cigarettes and cigars can cause lung and heart damage  Ask your healthcare provider for information if you currently smoke and need help to quit  E-cigarettes or smokeless tobacco still contain nicotine  Talk to your healthcare provider before you use these products  · Eat a variety of healthy, low-fat foods  Healthy foods include fruits, vegetables, whole-grain breads, low-fat dairy products, beans, lean meats, and fish  Ask for more information about a heart healthy diet  · Ask about activity  Your healthcare provider will tell you which activities to limit or avoid  Ask when you can drive, return to work, and have sex  Ask about the best exercise plan for you  · Maintain a healthy weight  Ask your healthcare provider how much you should weigh  Ask him or her to help you create a weight loss plan if you are overweight  © 2017 [employer] Information is for End User's use only and may not be sold, redistributed or otherwise used for commercial purposes  All illustrations and images included in CareNotes® are the copyrighted property of [employer] , [address]  or Jemal Bonilla  The above information is an  only   It is not intended as medical advice for individual conditions or treatments  Talk to your doctor, nurse or pharmacist before following any medical regimen to see if it is safe and effective for you

## 2018-06-29 ENCOUNTER — HOSPITAL ENCOUNTER (OUTPATIENT)
Dept: NON INVASIVE DIAGNOSTICS | Facility: CLINIC | Age: 58
Discharge: HOME | End: 2018-06-29
Payer: COMMERCIAL

## 2018-06-29 DIAGNOSIS — R07.9 CHEST PAIN: ICD-10-CM

## 2018-06-29 PROCEDURE — 93017 CV STRESS TEST TRACING ONLY: CPT

## 2018-06-29 PROCEDURE — 93016 CV STRESS TEST SUPVJ ONLY: CPT | Performed by: INTERNAL MEDICINE

## 2018-06-29 PROCEDURE — 93018 CV STRESS TEST I&R ONLY: CPT | Performed by: INTERNAL MEDICINE

## 2018-08-15 DIAGNOSIS — E03.9 HYPOTHYROIDISM, UNSPECIFIED TYPE: Primary | ICD-10-CM

## 2018-08-15 RX ORDER — LEVOTHYROXINE SODIUM 0.1 MG/1
100 TABLET ORAL DAILY
Qty: 90 TABLET | Refills: 1 | Status: SHIPPED | OUTPATIENT
Start: 2018-08-15 | End: 2019-02-28 | Stop reason: SDUPTHER

## 2019-02-28 DIAGNOSIS — E03.9 HYPOTHYROIDISM, UNSPECIFIED TYPE: ICD-10-CM

## 2019-03-01 RX ORDER — LEVOTHYROXINE SODIUM 0.1 MG/1
100 TABLET ORAL DAILY
Qty: 90 TABLET | Refills: 0 | Status: SHIPPED | OUTPATIENT
Start: 2019-03-01 | End: 2019-06-10 | Stop reason: SDUPTHER

## 2019-06-10 DIAGNOSIS — E03.9 ACQUIRED HYPOTHYROIDISM: Primary | ICD-10-CM

## 2019-06-10 DIAGNOSIS — E03.9 HYPOTHYROIDISM, UNSPECIFIED TYPE: ICD-10-CM

## 2019-06-10 DIAGNOSIS — E78.00 HYPERCHOLESTEREMIA: ICD-10-CM

## 2019-06-10 RX ORDER — LEVOTHYROXINE SODIUM 0.1 MG/1
100 TABLET ORAL DAILY
Qty: 30 TABLET | Refills: 0 | Status: SHIPPED | OUTPATIENT
Start: 2019-06-10 | End: 2019-07-15 | Stop reason: SDUPTHER

## 2019-06-17 ENCOUNTER — HOSPITAL ENCOUNTER (OUTPATIENT)
Dept: MAMMOGRAPHY | Facility: CLINIC | Age: 59
Discharge: HOME | End: 2019-06-17
Payer: COMMERCIAL

## 2019-06-17 VITALS — WEIGHT: 200 LBS | HEIGHT: 64 IN | BODY MASS INDEX: 34.15 KG/M2

## 2019-06-17 DIAGNOSIS — Z12.39 ENCOUNTER FOR BREAST CANCER SCREENING OTHER THAN MAMMOGRAM: ICD-10-CM

## 2019-06-17 PROCEDURE — 77067 SCR MAMMO BI INCL CAD: CPT

## 2019-06-25 ENCOUNTER — TELEPHONE (OUTPATIENT)
Dept: FAMILY MEDICINE CLINIC | Facility: CLINIC | Age: 59
End: 2019-06-25

## 2019-07-13 LAB
ALBUMIN SERPL-MCNC: 4.5 G/DL (ref 3.5–5.5)
ALBUMIN/GLOB SERPL: 2 {RATIO} (ref 1.2–2.2)
ALP SERPL-CCNC: 94 IU/L (ref 39–117)
ALT SERPL-CCNC: 27 IU/L (ref 0–32)
AST SERPL-CCNC: 23 IU/L (ref 0–40)
BILIRUB SERPL-MCNC: 0.3 MG/DL (ref 0–1.2)
BUN SERPL-MCNC: 9 MG/DL (ref 6–24)
BUN/CREAT SERPL: 12 (ref 9–23)
CALCIUM SERPL-MCNC: 9.5 MG/DL (ref 8.7–10.2)
CHLORIDE SERPL-SCNC: 104 MMOL/L (ref 96–106)
CHOLEST SERPL-MCNC: 264 MG/DL (ref 100–199)
CHOLEST/HDLC SERPL: 6.4 RATIO (ref 0–4.4)
CO2 SERPL-SCNC: 19 MMOL/L (ref 20–29)
CREAT SERPL-MCNC: 0.74 MG/DL (ref 0.57–1)
GLOBULIN SER-MCNC: 2.3 G/DL (ref 1.5–4.5)
GLUCOSE SERPL-MCNC: 111 MG/DL (ref 65–99)
HDLC SERPL-MCNC: 41 MG/DL
LDLC SERPL CALC-MCNC: 175 MG/DL (ref 0–99)
POTASSIUM SERPL-SCNC: 4.2 MMOL/L (ref 3.5–5.2)
PROT SERPL-MCNC: 6.8 G/DL (ref 6–8.5)
SL AMB EGFR AFRICAN AMERICAN: 103 ML/MIN/1.73
SL AMB EGFR NON AFRICAN AMERICAN: 90 ML/MIN/1.73
SL AMB VLDL CHOLESTEROL CALC: 48 MG/DL (ref 5–40)
SODIUM SERPL-SCNC: 138 MMOL/L (ref 134–144)
TRIGL SERPL-MCNC: 240 MG/DL (ref 0–149)
TSH SERPL DL<=0.005 MIU/L-ACNC: 1.88 UIU/ML (ref 0.45–4.5)

## 2019-07-15 DIAGNOSIS — E78.00 HYPERCHOLESTEREMIA: Primary | ICD-10-CM

## 2019-07-15 DIAGNOSIS — E03.9 HYPOTHYROIDISM, UNSPECIFIED TYPE: ICD-10-CM

## 2019-07-15 RX ORDER — ATORVASTATIN CALCIUM 10 MG/1
10 TABLET, FILM COATED ORAL DAILY
Qty: 90 TABLET | Refills: 1 | Status: SHIPPED | OUTPATIENT
Start: 2019-07-15 | End: 2021-07-30

## 2019-07-15 RX ORDER — LEVOTHYROXINE SODIUM 0.1 MG/1
100 TABLET ORAL DAILY
Qty: 90 TABLET | Refills: 1 | Status: SHIPPED | OUTPATIENT
Start: 2019-07-15 | End: 2020-02-18 | Stop reason: SDUPTHER

## 2020-02-18 DIAGNOSIS — E03.9 HYPOTHYROIDISM, UNSPECIFIED TYPE: ICD-10-CM

## 2020-02-20 RX ORDER — LEVOTHYROXINE SODIUM 0.1 MG/1
100 TABLET ORAL DAILY
Qty: 90 TABLET | Refills: 0 | Status: SHIPPED | OUTPATIENT
Start: 2020-02-20 | End: 2020-05-06 | Stop reason: SDUPTHER

## 2020-04-29 DIAGNOSIS — E03.9 HYPOTHYROIDISM, UNSPECIFIED TYPE: ICD-10-CM

## 2020-04-29 RX ORDER — LEVOTHYROXINE SODIUM 0.1 MG/1
100 TABLET ORAL DAILY
Qty: 90 TABLET | Refills: 0 | Status: CANCELLED | OUTPATIENT
Start: 2020-04-29

## 2020-05-05 DIAGNOSIS — E03.9 HYPOTHYROIDISM, UNSPECIFIED TYPE: ICD-10-CM

## 2020-05-05 RX ORDER — LEVOTHYROXINE SODIUM 0.1 MG/1
100 TABLET ORAL DAILY
Qty: 90 TABLET | Refills: 0 | Status: CANCELLED | OUTPATIENT
Start: 2020-05-05

## 2020-05-05 RX ORDER — LEVOTHYROXINE SODIUM 0.1 MG/1
100 TABLET ORAL DAILY
Qty: 90 TABLET | Refills: 0 | Status: CANCELLED | OUTPATIENT
Start: 2020-05-13

## 2020-05-06 ENCOUNTER — OFFICE VISIT (OUTPATIENT)
Dept: FAMILY MEDICINE CLINIC | Facility: CLINIC | Age: 60
End: 2020-05-06
Payer: COMMERCIAL

## 2020-05-06 VITALS
TEMPERATURE: 97.2 F | DIASTOLIC BLOOD PRESSURE: 80 MMHG | WEIGHT: 207 LBS | SYSTOLIC BLOOD PRESSURE: 130 MMHG | OXYGEN SATURATION: 95 % | BODY MASS INDEX: 35.34 KG/M2 | HEIGHT: 64 IN | HEART RATE: 94 BPM

## 2020-05-06 DIAGNOSIS — R73.01 IMPAIRED FASTING GLUCOSE: ICD-10-CM

## 2020-05-06 DIAGNOSIS — E78.00 HYPERCHOLESTEREMIA: Primary | ICD-10-CM

## 2020-05-06 DIAGNOSIS — E03.9 HYPOTHYROIDISM, UNSPECIFIED TYPE: ICD-10-CM

## 2020-05-06 PROCEDURE — 1036F TOBACCO NON-USER: CPT | Performed by: NURSE PRACTITIONER

## 2020-05-06 PROCEDURE — 3008F BODY MASS INDEX DOCD: CPT | Performed by: NURSE PRACTITIONER

## 2020-05-06 PROCEDURE — 99214 OFFICE O/P EST MOD 30 MIN: CPT | Performed by: NURSE PRACTITIONER

## 2020-05-06 RX ORDER — LEVOTHYROXINE SODIUM 0.1 MG/1
100 TABLET ORAL DAILY
Qty: 90 TABLET | Refills: 1 | Status: SHIPPED | OUTPATIENT
Start: 2020-05-06 | End: 2020-12-07 | Stop reason: SDUPTHER

## 2020-05-15 ENCOUNTER — OFFICE VISIT (OUTPATIENT)
Dept: FAMILY MEDICINE CLINIC | Facility: CLINIC | Age: 60
End: 2020-05-15
Payer: COMMERCIAL

## 2020-05-15 VITALS
OXYGEN SATURATION: 95 % | BODY MASS INDEX: 35 KG/M2 | WEIGHT: 205 LBS | TEMPERATURE: 98.2 F | HEIGHT: 64 IN | SYSTOLIC BLOOD PRESSURE: 124 MMHG | DIASTOLIC BLOOD PRESSURE: 78 MMHG | HEART RATE: 86 BPM

## 2020-05-15 DIAGNOSIS — Z00.00 ANNUAL PHYSICAL EXAM: Primary | ICD-10-CM

## 2020-05-15 DIAGNOSIS — Z12.31 SCREENING MAMMOGRAM, ENCOUNTER FOR: ICD-10-CM

## 2020-05-15 DIAGNOSIS — Z01.419 ENCOUNTER FOR GYNECOLOGICAL EXAMINATION WITHOUT ABNORMAL FINDING: ICD-10-CM

## 2020-05-15 PROCEDURE — 99396 PREV VISIT EST AGE 40-64: CPT | Performed by: NURSE PRACTITIONER

## 2020-05-15 PROCEDURE — 3008F BODY MASS INDEX DOCD: CPT | Performed by: NURSE PRACTITIONER

## 2020-05-19 ENCOUNTER — TELEPHONE (OUTPATIENT)
Dept: FAMILY MEDICINE CLINIC | Facility: CLINIC | Age: 60
End: 2020-05-19

## 2020-05-19 LAB
CYTOLOGIST CVX/VAG CYTO: NORMAL
DX ICD CODE: NORMAL
HPV I/H RISK 1 DNA CVX QL PROBE+SIG AMP: NEGATIVE
OTHER STN SPEC: NORMAL
PATH REPORT.FINAL DX SPEC: NORMAL
SL AMB NOTE:: NORMAL
SL AMB SPECIMEN ADEQUACY: NORMAL
SL AMB TEST METHODOLOGY: NORMAL

## 2020-12-07 DIAGNOSIS — E03.9 HYPOTHYROIDISM, UNSPECIFIED TYPE: ICD-10-CM

## 2020-12-08 RX ORDER — LEVOTHYROXINE SODIUM 0.1 MG/1
100 TABLET ORAL DAILY
Qty: 90 TABLET | Refills: 1 | Status: SHIPPED | OUTPATIENT
Start: 2020-12-08 | End: 2021-06-11 | Stop reason: SDUPTHER

## 2021-04-02 ENCOUNTER — HOSPITAL ENCOUNTER (EMERGENCY)
Facility: HOSPITAL | Age: 61
Discharge: HOME | End: 2021-04-02
Attending: EMERGENCY MEDICINE | Admitting: EMERGENCY MEDICINE
Payer: COMMERCIAL

## 2021-04-02 ENCOUNTER — APPOINTMENT (EMERGENCY)
Dept: RADIOLOGY | Facility: HOSPITAL | Age: 61
End: 2021-04-02
Payer: COMMERCIAL

## 2021-04-02 ENCOUNTER — TELEPHONE (OUTPATIENT)
Dept: INFUSION CENTER | Facility: CLINIC | Age: 61
End: 2021-04-02

## 2021-04-02 ENCOUNTER — TELEMEDICINE (OUTPATIENT)
Dept: FAMILY MEDICINE CLINIC | Facility: CLINIC | Age: 61
End: 2021-04-02
Payer: COMMERCIAL

## 2021-04-02 ENCOUNTER — TELEPHONE (OUTPATIENT)
Dept: FAMILY MEDICINE CLINIC | Facility: CLINIC | Age: 61
End: 2021-04-02

## 2021-04-02 VITALS
SYSTOLIC BLOOD PRESSURE: 113 MMHG | TEMPERATURE: 99.2 F | BODY MASS INDEX: 35 KG/M2 | RESPIRATION RATE: 20 BRPM | HEART RATE: 92 BPM | DIASTOLIC BLOOD PRESSURE: 65 MMHG | WEIGHT: 205 LBS | HEIGHT: 64 IN | OXYGEN SATURATION: 93 %

## 2021-04-02 DIAGNOSIS — Z12.31 SCREENING MAMMOGRAM, ENCOUNTER FOR: ICD-10-CM

## 2021-04-02 DIAGNOSIS — U07.1 COVID-19 VIRUS INFECTION: Primary | ICD-10-CM

## 2021-04-02 DIAGNOSIS — U07.1 COVID-19 VIRUS INFECTION: ICD-10-CM

## 2021-04-02 LAB
ALBUMIN SERPL BCP-MCNC: 4 G/DL (ref 3.5–5)
ALP SERPL-CCNC: 86 U/L (ref 46–116)
ALT SERPL W P-5'-P-CCNC: 88 U/L (ref 12–78)
ANION GAP SERPL CALCULATED.3IONS-SCNC: 18 MMOL/L (ref 4–13)
APTT PPP: 29 SECONDS (ref 23–37)
AST SERPL W P-5'-P-CCNC: 85 U/L (ref 5–45)
BASOPHILS # BLD AUTO: 0.02 THOUSANDS/ΜL (ref 0–0.1)
BASOPHILS NFR BLD AUTO: 0 % (ref 0–1)
BILIRUB SERPL-MCNC: 0.4 MG/DL (ref 0.2–1)
BILIRUB UR QL STRIP: NEGATIVE
BUN SERPL-MCNC: 14 MG/DL (ref 5–25)
CALCIUM SERPL-MCNC: 8.6 MG/DL (ref 8.3–10.1)
CHLORIDE SERPL-SCNC: 99 MMOL/L (ref 100–108)
CLARITY UR: CLEAR
CO2 SERPL-SCNC: 19 MMOL/L (ref 21–32)
COLOR UR: YELLOW
CREAT SERPL-MCNC: 0.93 MG/DL (ref 0.6–1.3)
EOSINOPHIL # BLD AUTO: 0 THOUSAND/ΜL (ref 0–0.61)
EOSINOPHIL NFR BLD AUTO: 0 % (ref 0–6)
ERYTHROCYTE [DISTWIDTH] IN BLOOD BY AUTOMATED COUNT: 13.7 % (ref 11.6–15.1)
FLUAV RNA RESP QL NAA+PROBE: NEGATIVE
FLUBV RNA RESP QL NAA+PROBE: NEGATIVE
GFR SERPL CREATININE-BSD FRML MDRD: 67 ML/MIN/1.73SQ M
GLUCOSE SERPL-MCNC: 162 MG/DL (ref 65–140)
GLUCOSE UR STRIP-MCNC: NEGATIVE MG/DL
HCT VFR BLD AUTO: 41.1 % (ref 34.8–46.1)
HGB BLD-MCNC: 14 G/DL (ref 11.5–15.4)
HGB UR QL STRIP.AUTO: NEGATIVE
IMM GRANULOCYTES # BLD AUTO: 0.02 THOUSAND/UL (ref 0–0.2)
IMM GRANULOCYTES NFR BLD AUTO: 0 % (ref 0–2)
INR PPP: 1 (ref 0.84–1.19)
KETONES UR STRIP-MCNC: ABNORMAL MG/DL
LACTATE SERPL-SCNC: 1.2 MMOL/L (ref 0.5–2)
LEUKOCYTE ESTERASE UR QL STRIP: NEGATIVE
LYMPHOCYTES # BLD AUTO: 0.76 THOUSANDS/ΜL (ref 0.6–4.47)
LYMPHOCYTES NFR BLD AUTO: 11 % (ref 14–44)
MCH RBC QN AUTO: 30.4 PG (ref 26.8–34.3)
MCHC RBC AUTO-ENTMCNC: 34.1 G/DL (ref 31.4–37.4)
MCV RBC AUTO: 89 FL (ref 82–98)
MONOCYTES # BLD AUTO: 0.48 THOUSAND/ΜL (ref 0.17–1.22)
MONOCYTES NFR BLD AUTO: 7 % (ref 4–12)
NEUTROPHILS # BLD AUTO: 5.51 THOUSANDS/ΜL (ref 1.85–7.62)
NEUTS SEG NFR BLD AUTO: 82 % (ref 43–75)
NITRITE UR QL STRIP: NEGATIVE
NRBC BLD AUTO-RTO: 0 /100 WBCS
PH UR STRIP.AUTO: 6 [PH]
PLATELET # BLD AUTO: 245 THOUSANDS/UL (ref 149–390)
PMV BLD AUTO: 10.5 FL (ref 8.9–12.7)
POTASSIUM SERPL-SCNC: 3.3 MMOL/L (ref 3.5–5.3)
PROCALCITONIN SERPL-MCNC: 0.06 NG/ML
PROT SERPL-MCNC: 7.7 G/DL (ref 6.4–8.2)
PROT UR STRIP-MCNC: NEGATIVE MG/DL
PROTHROMBIN TIME: 13.2 SECONDS (ref 11.6–14.5)
RBC # BLD AUTO: 4.6 MILLION/UL (ref 3.81–5.12)
RSV RNA RESP QL NAA+PROBE: NEGATIVE
SARS-COV-2 RNA RESP QL NAA+PROBE: POSITIVE
SODIUM SERPL-SCNC: 136 MMOL/L (ref 136–145)
SP GR UR STRIP.AUTO: <=1.005 (ref 1–1.03)
UROBILINOGEN UR QL STRIP.AUTO: 0.2 E.U./DL
WBC # BLD AUTO: 6.79 THOUSAND/UL (ref 4.31–10.16)

## 2021-04-02 PROCEDURE — 85610 PROTHROMBIN TIME: CPT | Performed by: EMERGENCY MEDICINE

## 2021-04-02 PROCEDURE — 96360 HYDRATION IV INFUSION INIT: CPT

## 2021-04-02 PROCEDURE — 71045 X-RAY EXAM CHEST 1 VIEW: CPT

## 2021-04-02 PROCEDURE — 81003 URINALYSIS AUTO W/O SCOPE: CPT | Performed by: EMERGENCY MEDICINE

## 2021-04-02 PROCEDURE — 0241U HB NFCT DS VIR RESP RNA 4 TRGT: CPT | Performed by: EMERGENCY MEDICINE

## 2021-04-02 PROCEDURE — 99212 OFFICE O/P EST SF 10 MIN: CPT | Performed by: FAMILY MEDICINE

## 2021-04-02 PROCEDURE — 85730 THROMBOPLASTIN TIME PARTIAL: CPT | Performed by: EMERGENCY MEDICINE

## 2021-04-02 PROCEDURE — 99284 EMERGENCY DEPT VISIT MOD MDM: CPT | Performed by: EMERGENCY MEDICINE

## 2021-04-02 PROCEDURE — 85025 COMPLETE CBC W/AUTO DIFF WBC: CPT | Performed by: EMERGENCY MEDICINE

## 2021-04-02 PROCEDURE — 99284 EMERGENCY DEPT VISIT MOD MDM: CPT

## 2021-04-02 PROCEDURE — 36415 COLL VENOUS BLD VENIPUNCTURE: CPT | Performed by: EMERGENCY MEDICINE

## 2021-04-02 PROCEDURE — 83605 ASSAY OF LACTIC ACID: CPT | Performed by: EMERGENCY MEDICINE

## 2021-04-02 PROCEDURE — 80053 COMPREHEN METABOLIC PANEL: CPT | Performed by: EMERGENCY MEDICINE

## 2021-04-02 PROCEDURE — 84145 PROCALCITONIN (PCT): CPT | Performed by: EMERGENCY MEDICINE

## 2021-04-02 PROCEDURE — 96361 HYDRATE IV INFUSION ADD-ON: CPT

## 2021-04-02 PROCEDURE — 87040 BLOOD CULTURE FOR BACTERIA: CPT | Performed by: EMERGENCY MEDICINE

## 2021-04-02 RX ORDER — ALBUTEROL SULFATE 90 UG/1
3 AEROSOL, METERED RESPIRATORY (INHALATION) ONCE AS NEEDED
Status: CANCELLED | OUTPATIENT
Start: 2021-04-03

## 2021-04-02 RX ORDER — ACETAMINOPHEN 325 MG/1
650 TABLET ORAL ONCE
Status: COMPLETED | OUTPATIENT
Start: 2021-04-02 | End: 2021-04-02

## 2021-04-02 RX ORDER — ACETAMINOPHEN 325 MG/1
650 TABLET ORAL ONCE AS NEEDED
Status: CANCELLED | OUTPATIENT
Start: 2021-04-03

## 2021-04-02 RX ORDER — SODIUM CHLORIDE 9 MG/ML
20 INJECTION, SOLUTION INTRAVENOUS ONCE
Status: CANCELLED | OUTPATIENT
Start: 2021-04-03

## 2021-04-02 RX ADMIN — ACETAMINOPHEN 650 MG: 325 TABLET, FILM COATED ORAL at 11:38

## 2021-04-02 RX ADMIN — SODIUM CHLORIDE 1000 ML: 0.9 INJECTION, SOLUTION INTRAVENOUS at 09:00

## 2021-04-02 NOTE — ED PROVIDER NOTES
History  Chief Complaint   Patient presents with    Fever - 9 weeks to 74 years     patient presents to the ED with c/o fever and headache, states COVID exposure last saturday      71-year-old female with history of anxiety and high cholesterol says she has had headache with intermittent nausea, chills and diaphoresis for the past 5 days  She states she had high fever this morning, 106° Fahrenheit  She was exposed to neighbors 7 days ago who tested positive for COVID  She coughed once or twice this week  She had no nasal congestion or postnasal drip  No rash  No dysuria  Headache has improved with over-the-counter medications this morning  Temperature now is 99 2°  She has no high risk factors other than obesity  Her exam is unremarkable currently, speaking full sentences without any dyspnea   states that patient's temperature was 105° this morning when he took it  He also says that she complained on the way over her that she was very dizzy  Although vitals look close to normal I will do sepsis workup  Prior to Admission Medications   Prescriptions Last Dose Informant Patient Reported? Taking?    Magnesium Oxide 140 MG CAPS   Yes No   Sig: Take 140 mg by mouth daily     Probiotic Product (PROBIOTIC-10) CAPS   Yes No   Sig: Take 1 tablet by mouth daily     atorvastatin (LIPITOR) 10 mg tablet   No No   Sig: Take 1 tablet (10 mg total) by mouth daily   Patient not taking: Reported on 5/6/2020   cyanocobalamin (VITAMIN B-12) 100 mcg tablet   Yes No   Sig: Take 100 mcg by mouth daily     levothyroxine 100 mcg tablet   No No   Sig: Take 1 tablet (100 mcg total) by mouth daily      Facility-Administered Medications: None       Past Medical History:   Diagnosis Date    Anxiety     Disease of thyroid gland        Past Surgical History:   Procedure Laterality Date    BREAST BIOPSY Right     benign       Family History   Problem Relation Age of Onset    Heart failure Mother     Diabetes Mother  Cancer Father     Pancreatic cancer Father     No Known Problems Maternal Grandmother     No Known Problems Maternal Grandfather     No Known Problems Paternal Grandmother     No Known Problems Paternal Grandfather     Uterine cancer Sister 58    No Known Problems Sister     No Known Problems Sister     No Known Problems Sister      I have reviewed and agree with the history as documented  E-Cigarette/Vaping     E-Cigarette/Vaping Substances     Social History     Tobacco Use    Smoking status: Never Smoker    Smokeless tobacco: Never Used   Substance Use Topics    Alcohol use: Yes     Comment: socially    Drug use: No       Review of Systems   Constitutional: Positive for chills, diaphoresis and fever  Respiratory: Positive for cough (infrequent, nonproductive)  Gastrointestinal: Positive for nausea  Negative for abdominal pain, diarrhea and vomiting  Neurological: Positive for dizziness and headaches  Physical Exam  Physical Exam  Vitals signs and nursing note reviewed  Constitutional:       General: She is not in acute distress  Appearance: She is well-developed  She is obese  She is not ill-appearing, toxic-appearing or diaphoretic  HENT:      Head: Normocephalic and atraumatic  Nose: No congestion  Mouth/Throat:      Mouth: Mucous membranes are moist       Pharynx: Oropharynx is clear  No oropharyngeal exudate or posterior oropharyngeal erythema  Eyes:      Conjunctiva/sclera: Conjunctivae normal       Pupils: Pupils are equal, round, and reactive to light  Neck:      Musculoskeletal: Normal range of motion and neck supple  No muscular tenderness  Cardiovascular:      Rate and Rhythm: Normal rate and regular rhythm  Pulses: Normal pulses  Heart sounds: No murmur  Pulmonary:      Effort: Pulmonary effort is normal       Breath sounds: Normal breath sounds  Abdominal:      General: Bowel sounds are normal       Palpations: Abdomen is soft  Tenderness: There is no abdominal tenderness  There is no guarding or rebound  Musculoskeletal: Normal range of motion  Right lower leg: No edema  Left lower leg: No edema  Lymphadenopathy:      Cervical: No cervical adenopathy  Skin:     General: Skin is warm and dry  Capillary Refill: Capillary refill takes less than 2 seconds  Findings: No rash  Neurological:      General: No focal deficit present  Mental Status: She is alert and oriented to person, place, and time  Mental status is at baseline  Cranial Nerves: No cranial nerve deficit  Coordination: Coordination normal       Deep Tendon Reflexes: Reflexes are normal and symmetric  Psychiatric:         Mood and Affect: Mood normal          Behavior: Behavior normal          Vital Signs  ED Triage Vitals   Temperature Pulse Respirations Blood Pressure SpO2   04/02/21 0814 04/02/21 0814 04/02/21 0814 04/02/21 0814 04/02/21 0814   98 7 °F (37 1 °C) 102 20 123/97 93 %      Temp Source Heart Rate Source Patient Position - Orthostatic VS BP Location FiO2 (%)   04/02/21 0814 04/02/21 0814 04/02/21 0814 04/02/21 0814 --   Tympanic Monitor Sitting Right arm       Pain Score       04/02/21 1138       9           Vitals:    04/02/21 0814 04/02/21 0817 04/02/21 0900   BP: 123/97  113/65   Pulse: 102 98 92   Patient Position - Orthostatic VS: Sitting  Lying         Visual Acuity      ED Medications  Medications   sodium chloride 0 9 % bolus 1,000 mL (0 mL Intravenous Stopped 4/2/21 1138)   acetaminophen (TYLENOL) tablet 650 mg (650 mg Oral Given 4/2/21 1138)       Diagnostic Studies  Results Reviewed     Procedure Component Value Units Date/Time    Blood culture #1 [[id number]] Collected: 04/02/21 0857    Lab Status: Preliminary result Specimen: Blood from Arm, Left Updated: 04/04/21 1401     Blood Culture No Growth at 48 hrs      Blood culture #2 [[id number]] Collected: 04/02/21 0857    Lab Status: Preliminary result Specimen: Blood from Hand, Left Updated: 04/04/21 1401     Blood Culture No Growth at 48 hrs  Procalcitonin with AM Reflex [[id number]]  (Normal) Collected: 04/02/21 0857    Lab Status: Final result Specimen: Blood from Arm, Left Updated: 04/02/21 1336     Procalcitonin 0 06 ng/ml     UA w Reflex to Microscopic w Reflex to Culture [[id number]]  (Abnormal) Collected: 04/02/21 1053    Lab Status: Final result Specimen: Urine, Clean Catch Updated: 04/02/21 1102     Color, UA Yellow     Clarity, UA Clear     Specific Gravity, UA <=1 005     pH, UA 6 0     Leukocytes, UA Negative     Nitrite, UA Negative     Protein, UA Negative mg/dl      Glucose, UA Negative mg/dl      Ketones, UA Trace mg/dl      Urobilinogen, UA 0 2 E U /dl      Bilirubin, UA Negative     Blood, UA Negative    COVID19, Influenza A/B, RSV PCR, [facility] [[id number]]  (Abnormal) Collected: 04/02/21 0857    Lab Status: Final result Specimen: Nasopharyngeal Swab Updated: 04/02/21 1007     SARS-CoV-2 Positive     INFLUENZA A PCR Negative     INFLUENZA B PCR Negative     RSV PCR Negative    Narrative: This test has been authorized by FDA under an EUA (Emergency Use Assay) for use by authorized laboratories  Clinical caution and judgement should be used with the interpretation of these results with consideration of the clinical impression and other laboratory testing  Testing reported as "Positive" or "Negative" has been proven to be accurate according to standard laboratory validation requirements  All testing is performed with control materials showing appropriate reactivity at standard intervals  Lactic acid [[id number]]  (Normal) Collected: 04/02/21 0857    Lab Status: Final result Specimen: Blood from Arm, Left Updated: 04/02/21 0932     LACTIC ACID 1 2 mmol/L     Narrative:      Result may be elevated if tourniquet was used during collection      Comprehensive metabolic panel [[id number]]  (Abnormal) Collected: 04/02/21 0857    Lab Status: Final result Specimen: Blood from Arm, Left Updated: 04/02/21 0927     Sodium 136 mmol/L      Potassium 3 3 mmol/L      Chloride 99 mmol/L      CO2 19 mmol/L      ANION GAP 18 mmol/L      BUN 14 mg/dL      Creatinine 0 93 mg/dL      Glucose 162 mg/dL      Calcium 8 6 mg/dL      AST 85 U/L      ALT 88 U/L      Alkaline Phosphatase 86 U/L      Total Protein 7 7 g/dL      Albumin 4 0 g/dL      Total Bilirubin 0 40 mg/dL      eGFR 67 ml/min/1 73sq m     Narrative:      [place] guidelines for Chronic Kidney Disease (CKD):     Stage 1 with normal or high GFR (GFR > 90 mL/min/1 73 square meters)    Stage 2 Mild CKD (GFR = 60-89 mL/min/1 73 square meters)    Stage 3A Moderate CKD (GFR = 45-59 mL/min/1 73 square meters)    Stage 3B Moderate CKD (GFR = 30-44 mL/min/1 73 square meters)    Stage 4 Severe CKD (GFR = 15-29 mL/min/1 73 square meters)    Stage 5 End Stage CKD (GFR <15 mL/min/1 73 square meters)  Note: GFR calculation is accurate only with a steady state creatinine    Protime-INR [[id number]]  (Normal) Collected: 04/02/21 0857    Lab Status: Final result Specimen: Blood from Arm, Left Updated: 04/02/21 0923     Protime 13 2 seconds      INR 1 00    APTT [[id number]]  (Normal) Collected: 04/02/21 0857    Lab Status: Final result Specimen: Blood from Arm, Left Updated: 04/02/21 0923     PTT 29 seconds     CBC and differential [[id number]]  (Abnormal) Collected: 04/02/21 0857    Lab Status: Final result Specimen: Blood from Arm, Left Updated: 04/02/21 0909     WBC 6 79 Thousand/uL      RBC 4 60 Million/uL      Hemoglobin 14 0 g/dL      Hematocrit 41 1 %      MCV 89 fL      MCH 30 4 pg      MCHC 34 1 g/dL      RDW 13 7 %      MPV 10 5 fL      Platelets 146 Thousands/uL      nRBC 0 /100 WBCs      Neutrophils Relative 82 %      Immat GRANS % 0 %      Lymphocytes Relative 11 %      Monocytes Relative 7 %      Eosinophils Relative 0 %      Basophils Relative 0 %      Neutrophils Absolute 5 51 Thousands/µL Immature Grans Absolute 0 02 Thousand/uL      Lymphocytes Absolute 0 76 Thousands/µL      Monocytes Absolute 0 48 Thousand/µL      Eosinophils Absolute 0 00 Thousand/µL      Basophils Absolute 0 02 Thousands/µL                  XR chest 1 view portable   ED Interpretation by Elodia Bales DO (04/02 1210)   Unremarkable      Final Result by Awa Han MD (04/02 1215)   No acute cardiopulmonary disease  Findings are stable            Workstation performed: [id number]                    Procedures  Procedures         ED Course                             SBIRT 22yo+      Most Recent Value   SBIRT (24 yo +)   In order to provide better care to our patients, we are screening all of our patients for alcohol and drug use  Would it be okay to ask you these screening questions? Yes Filed at: 04/02/2021 8162   Initial Alcohol Screen: US AUDIT-C    1  How often do you have a drink containing alcohol?  0 Filed at: 04/02/2021 0826   2  How many drinks containing alcohol do you have on a typical day you are drinking? 0 Filed at: 04/02/2021 0826   3a  Male UNDER 65: How often do you have five or more drinks on one occasion? 0 Filed at: 04/02/2021 0826   3b  FEMALE Any Age, or MALE 65+: How often do you have 4 or more drinks on one occassion? 0 Filed at: 04/02/2021 0826   Audit-C Score  0 Filed at: 04/02/2021 5481   JONI: How many times in the past year have you    Used an illegal drug or used a prescription medication for non-medical reasons?   Never Filed at: 04/02/2021 9895                    MDM  Number of Diagnoses or Management Options  COVID-19 virus infection: new and requires workup     Amount and/or Complexity of Data Reviewed  Clinical lab tests: ordered and reviewed  Tests in the radiology section of CPT®: ordered and reviewed  Review and summarize past medical records: yes  Independent visualization of images, tracings, or specimens: yes        Disposition  Final diagnoses:   COVID-19 virus infection     Time reflects when diagnosis was documented in both MDM as applicable and the Disposition within this note     Time User Action Codes Description Comment    4/2/2021 12:20 PM Bass Carlitas Add [U07 1] COVID-19 virus infection       ED Disposition     ED Disposition Condition Date/Time Comment    Discharge Stable Fri Apr 2, 2021 12:20 PM Theresa Leslie discharge to home/self care  Follow-up Information     Follow up With Specialties Details Why Contact Info    Myrtle Thao MD [facility] Medicine Call today  [address]  [address] [address]  [facility] [address]            Discharge Medication List as of 4/2/2021 12:24 PM      CONTINUE these medications which have NOT CHANGED    Details   atorvastatin (LIPITOR) 10 mg tablet Take 1 tablet (10 mg total) by mouth daily, Starting Mon 7/15/2019, Normal      cyanocobalamin (VITAMIN B-12) 100 mcg tablet Take 100 mcg by mouth daily  , Historical Med      levothyroxine 100 mcg tablet Take 1 tablet (100 mcg total) by mouth daily, Starting Tue 12/8/2020, Normal      Magnesium Oxide 140 MG CAPS Take 140 mg by mouth daily  , Historical Med      Probiotic Product (PROBIOTIC-10) CAPS Take 1 tablet by mouth daily  , Historical Med           No discharge procedures on file      PDMP Review     None          ED Provider  Electronically Signed by           Jesús Vazquez DO  04/05/21 1610

## 2021-04-02 NOTE — NURSING NOTE
Jeremy text Dr Manjit Dexter at this time to make aware that a note is needed in Epic stating that the EUA and the medication were both reviewed with the pt so we can proceed with treatment tomorrow

## 2021-04-02 NOTE — DISCHARGE INSTRUCTIONS
You tested positive for COVID-19  Quarantine according to the instructions below  Contact your doctor today to talk about getting monoclonal antibody therapy within the next 2 days at [facility], [address]  He will help you set that up if he agrees  Starting today take vitamin-D 3, 2000 I you daily  Take vitamin C, 1 gram every 12 hours  Take a multivitamin daily  Please share this information with your  - he should start taking these vitamins a well  Consider buying a pulse oximeter to check your oxygen level occasionally, if you are feeling worse  Check your oxygen level and tell your healthcare provider if it is below 90-92%  [address]    Your healthcare provider and/or public health staff have evaluated you and have determined that you do not need to remain in the hospital at this time  At this time you can be isolated at home where you will be monitored by staff from your local or state health department  You should carefully follow the prevention and isolation steps below until a healthcare provider or local or state health department says that you can return to your normal activities  Stay home except to get medical care    People who are mildly ill with COVID-19 are able to isolate at home during their illness  You should restrict activities outside your home, except for getting medical care  Do not go to work, school, or public areas  Avoid using public transportation, ride-sharing, or taxis  Separate yourself from other people and animals in your home    People: As much as possible, you should stay in a specific room and away from other people in your home  Also, you should use a separate bathroom, if available  Animals: You should restrict contact with pets and other animals while you are sick with COVID-19, just like you would around other people   Although there have not been reports of pets or other animals becoming sick with COVID-19, it is still recommended that people sick with COVID-19 limit contact with animals until more information is known about the virus  When possible, have another member of your household care for your animals while you are sick  If you are sick with COVID-19, avoid contact with your pet, including petting, snuggling, being kissed or licked, and sharing food  If you must care for your pet or be around animals while you are sick, wash your hands before and after you interact with pets and wear a facemask  See COVID-19 and Animals for more information  Call ahead before visiting your doctor    If you have a medical appointment, call the healthcare provider and tell them that you have or may have COVID-19  This will help the healthcare providers office take steps to keep other people from getting infected or exposed  Wear a facemask    You should wear a facemask when you are around other people (e g , sharing a room or vehicle) or pets and before you enter a healthcare providers office  If you are not able to wear a facemask (for example, because it causes trouble breathing), then people who live with you should not stay in the same room with you, or they should wear a facemask if they enter your room  Cover your coughs and sneezes    Cover your mouth and nose with a tissue when you cough or sneeze  Throw used tissues in a lined trash can  Immediately wash your hands with soap and water for at least 20 seconds or, if soap and water are not available, clean your hands with an alcohol-based hand  that contains at least 60% alcohol  Clean your hands often    Wash your hands often with soap and water for at least 20 seconds, especially after blowing your nose, coughing, or sneezing; going to the bathroom; and before eating or preparing food   If soap and water are not readily available, use an alcohol-based hand  with at least 60% alcohol, covering all surfaces of your hands and rubbing them together until they feel dry  Soap and water are the best option if hands are visibly dirty  Avoid touching your eyes, nose, and mouth with unwashed hands  Avoid sharing personal household items    You should not share dishes, drinking glasses, cups, eating utensils, towels, or bedding with other people or pets in your home  After using these items, they should be washed thoroughly with soap and water  Clean all high-touch surfaces everyday    High touch surfaces include counters, tabletops, doorknobs, bathroom fixtures, toilets, phones, keyboards, tablets, and bedside tables  Also, clean any surfaces that may have blood, stool, or body fluids on them  Use a household cleaning spray or wipe, according to the label instructions  Labels contain instructions for safe and effective use of the cleaning product including precautions you should take when applying the product, such as wearing gloves and making sure you have good ventilation during use of the product  Monitor your symptoms    Seek prompt medical attention if your illness is worsening (e g , difficulty breathing)  Before seeking care, call your healthcare provider and tell them that you have, or are being evaluated for, COVID-19  Put on a facemask before you enter the facility  These steps will help the healthcare providers office to keep other people in the office or waiting room from getting infected or exposed  Ask your healthcare provider to call the local or state health department  Persons who are placed under active monitoring or facilitated self-monitoring should follow instructions provided by their local health department or occupational health professionals, as appropriate  If you have a medical emergency and need to call 911, notify the dispatch personnel that you have, or are being evaluated for COVID-19  If possible, put on a facemask before emergency medical services arrive      Discontinuing home isolation    Patients with confirmed COVID-19 should remain under home isolation precautions until the following conditions are met: They have had no fever for at least 24 hours (that is one full day of no fever without the use medicine that reduces fevers)  AND  other symptoms have improved (for example, when their cough or shortness of breath have improved)  AND  If had mild or moderate illness, at least 10 days have passed since their symptoms first appeared or if severe illness (needed oxygen) or immunosuppressed, at least 20 days have passed since symptoms first appeared  Patients with confirmed COVID-19 should also notify close contacts (including their workplace) and ask that they self-quarantine  Currently, close contact is defined as being within 6 feet for 15 minutes or more from the period 24 hours starting 48 hours before symptom onset to the time at which the patient went into isolation  Close contacts of patients diagnosed with COVID-19 should be instructed by the patient to self-quarantine for 14 days from the last time of their last contact with the patient       Source: [employer]

## 2021-04-02 NOTE — TELEPHONE ENCOUNTER
Patient is Covid Positive and meets criteria for Covid Infusion (BMI of 35 19) msg left to cb to discuss if she wants to move forward with this process

## 2021-04-02 NOTE — PROGRESS NOTES
COVID-19 Outpatient Progress Note    Assessment/Plan:    Problem List Items Addressed This Visit        Other    COVID-19 virus infection         Disposition:     Patient meets criteria for Bamlanivimab/Etesevimab infusion  They were counseled in regards to risks, benefits, and side effects of this infusion  Bamlanivimab and etesevimab are investigational medicines used to treat mild to moderate symptoms of COVID-19 in non-hospitalized adults and adolescents (15years of age and older who weigh at least 80 pounds (40 kg)), and who are at high risk for developing severe COVID-19 symptoms or the need for hospitalization  Bamlanivimab and etesevimab are investigational because they are still being studied  There is limited information known about the safety and effectiveness of using bamlanivimab and etesevimab to treat people with COVID-19  The FDA has authorized the emergency use of bamlanivimab and etesevimab for the treatment of COVID-19 under an Emergency Use Authorization (EUA)  How will I receive bamlanivimab and etesevimab? Bamlanivimab and etesevimab are given at the same time through a vein (intravenous or IV)    You will receive one dose of bamlanivimab and etesevimab by IV infusion  The infusion will take 21-60 minutes or longer  Possible side effects of bamlanivimab and etesevimab: Allergic reactions can happen during and after infusion with bamlanivimab and etesevimab  Possible reactions include: fever, chills, nausea, headache, shortness of breath, low or high blood pressure, rapid or slow heart rate, chest discomfort or pain, weakness, confusion, feeling tired, wheezing, swelling of your lips, face, or throat, rash including hives, itching, muscle aches, dizziness, and sweating  These reactions may be severe or life threatening      Worsening symptoms after treatment: You may experience new or worsening symptoms after infusion, including fever, difficulty breathing, rapid or slow heart rate, tiredness, weakness or confusion  If these occur, contact your healthcare provider or seek immediate medical attention as some of these events have required hospitalization  It is unknown if these events are related to treatment or are due to the progression of COVID19  The side effects of getting any medicine by vein may include brief pain, bleeding, bruising of the skin, soreness, swelling, and possible infection at the infusion site  These are not all the possible side effects of bamlanivimab and etesevimab  Not a lot of people have been given bamlanivimab and etesevimab  Serious and unexpected side effects may happen  Bamlanivimab and etesevimab are still being studied so it is possible that all of the risks are not known at this time  It is possible that bamlanivimab and etesevimab could interfere with your body's own ability to fight off a future infection of SARS-CoV-2  Similarly, bamlanivimab and etesevimab may reduce your bodys immune response to a vaccine for SARS-CoV-2  Specific studies have not been conducted to address these possible risks  Talk to your healthcare provider if you have any questions  Emergency Use Authorization:    The [facility] FDA has made bamlanivimab and etesevimab available under an emergency access mechanism called an EUA  The EUA is supported by a  of Health and Human Service (HHS) declaration that circumstances exist to justify the emergency use of drugs and biological products during the COVID-19 pandemic  Bamlanivimab and etesevimab have not undergone the same type of review as an FDA-approved or cleared product  The FDA may issue an EUA when certain criteria are met, which includes that there are no adequate, approved, and available alternatives   In addition, the FDA decision is based on the totality of scientific evidence available showing that it is reasonable to believe that the product meets certain criteria for safety, performance, and labeling and may be effective in treatment of patients during the COVID-19 pandemic  All of these criteria must be met to allow for the product to be used in the treatment of patients during the COVID-19 pandemic  The EUA for bamlanivimab and etesevimab together is in effect for the duration of the COVID-19 declaration justifying emergency use of these products, unless terminated or revoked (after which the product may no longer be used)  What if I am pregnant or breastfeeding? There is limited experience treating pregnant women or breastfeeding mothers with bamlanivimab and etesevimab  For a mother and unborn baby, the benefit of receiving bamlanivimab and etesevimab may be greater than the risk from the treatment  If you are pregnant or breastfeeding, discuss your options and specific situation with your healthcare provider  Regarding COVID-19 Vaccination:    Currently there is no data or safety or efficacy of COVID-19 vaccination in persons who received monoclonal antibodies as part of COVID-19 treatment  Treatment should be deferred for at least 90 days to avoid interference of the treatment with vaccine-induced immune responses (this is based on estimated half-life of therapies and evidence suggesting reinfection is uncommon within 90 days of initial infection)  Full fact sheet document for patients can be found at: [employer] pt    The patient consents to proceed with bamlanivimab and etesevimab infusion  I have spent 15 minutes directly with the patient  Greater than 50% of this time was spent in counseling/coordination of care regarding: impressions  Encounter provider Corene Kussmaul, MD    Provider located at [address] [place] [id number]    Recent Visits  No visits were found meeting these conditions     Showing recent visits within past 7 days and meeting all other requirements     Today's Visits  Date Type Provider Dept   04/02/21 Telemedicine Medardo Cade MD [facility]   04/02/21 Telephone Audrey Alves [facility]   Showing today's visits and meeting all other requirements     Future Appointments  No visits were found meeting these conditions  Showing future appointments within next 150 days and meeting all other requirements        Patient agrees to participate in a virtual check in via telephone or video visit instead of presenting to the office to address urgent/immediate medical needs  Patient is aware this is a billable service  After connecting through [facility], the patient was identified by name and date of birth  Gilberto Tse was informed that this was a telemedicine visit and that the exam was being conducted confidentially over secure lines  My office door was closed  No one else was in the room  Gilberto Tse acknowledged consent and understanding of privacy and security of the telemedicine visit  I informed the patient that I have reviewed her record in Epic and presented the opportunity for her to ask any questions regarding the visit today  The patient agreed to participate  Subjective:   Gilberto Tse is a 62 y o  female who is concerned about COVID-19  Patient's symptoms include fever, myalgias and headache       Date of symptom onset: 3/28/2021    Lab Results   Component Value Date    SARSCOV2 Positive (A) 04/02/2021     Past Medical History:   Diagnosis Date    Anxiety     Disease of thyroid gland      Past Surgical History:   Procedure Laterality Date    BREAST BIOPSY Right     benign     Current Outpatient Medications   Medication Sig Dispense Refill    atorvastatin (LIPITOR) 10 mg tablet Take 1 tablet (10 mg total) by mouth daily (Patient not taking: Reported on 5/6/2020) 90 tablet 1    cyanocobalamin (VITAMIN B-12) 100 mcg tablet Take 100 mcg by mouth daily        levothyroxine 100 mcg tablet Take 1 tablet (100 mcg total) by mouth daily 90 tablet 1    Magnesium Oxide 140 MG CAPS Take 140 mg by mouth daily        Probiotic Product (PROBIOTIC-10) CAPS Take 1 tablet by mouth daily         No current facility-administered medications for this visit  Allergies   Allergen Reactions    Sulfa Antibiotics Anaphylaxis       Review of Systems   Constitutional: Positive for fever  Musculoskeletal: Positive for myalgias  Neurological: Positive for headaches  Objective: There were no vitals filed for this visit  Physical Exam  VIRTUAL VISIT DISCLAIMER    Shun Oliveira acknowledges that she has consented to an online visit or consultation  She understands that the online visit is based solely on information provided by her, and that, in the absence of a face-to-face physical evaluation by the physician, the diagnosis she receives is both limited and provisional in terms of accuracy and completeness  This is not intended to replace a full medical face-to-face evaluation by the physician  Shun Oliveira understands and accepts these terms

## 2021-04-03 ENCOUNTER — HOSPITAL ENCOUNTER (OUTPATIENT)
Dept: INFUSION CENTER | Facility: HOSPITAL | Age: 61
Discharge: HOME | End: 2021-04-03
Payer: COMMERCIAL

## 2021-04-03 VITALS
TEMPERATURE: 100 F | SYSTOLIC BLOOD PRESSURE: 127 MMHG | HEART RATE: 78 BPM | DIASTOLIC BLOOD PRESSURE: 66 MMHG | OXYGEN SATURATION: 97 %

## 2021-04-03 DIAGNOSIS — U07.1 COVID-19 VIRUS INFECTION: Primary | ICD-10-CM

## 2021-04-03 PROCEDURE — M0245 HB BAMLAN AND ETESEV INFUSION ADMIN: HCPCS | Performed by: FAMILY MEDICINE

## 2021-04-03 RX ORDER — ACETAMINOPHEN 325 MG/1
650 TABLET ORAL ONCE AS NEEDED
Status: DISCONTINUED | OUTPATIENT
Start: 2021-04-03 | End: 2021-04-06 | Stop reason: HOSPADM

## 2021-04-03 RX ORDER — SODIUM CHLORIDE 9 MG/ML
20 INJECTION, SOLUTION INTRAVENOUS ONCE
Status: COMPLETED | OUTPATIENT
Start: 2021-04-03 | End: 2021-04-03

## 2021-04-03 RX ORDER — ALBUTEROL SULFATE 90 UG/1
3 AEROSOL, METERED RESPIRATORY (INHALATION) ONCE AS NEEDED
Status: CANCELLED | OUTPATIENT
Start: 2021-04-03

## 2021-04-03 RX ORDER — ALBUTEROL SULFATE 90 UG/1
3 AEROSOL, METERED RESPIRATORY (INHALATION) ONCE AS NEEDED
Status: DISCONTINUED | OUTPATIENT
Start: 2021-04-03 | End: 2021-04-06 | Stop reason: HOSPADM

## 2021-04-03 RX ORDER — SODIUM CHLORIDE 9 MG/ML
20 INJECTION, SOLUTION INTRAVENOUS ONCE
Status: CANCELLED | OUTPATIENT
Start: 2021-04-03

## 2021-04-03 RX ORDER — ACETAMINOPHEN 325 MG/1
650 TABLET ORAL ONCE AS NEEDED
Status: CANCELLED | OUTPATIENT
Start: 2021-04-03

## 2021-04-03 RX ADMIN — SODIUM CHLORIDE 20 ML/HR: 0.9 INJECTION, SOLUTION INTRAVENOUS at 13:00

## 2021-04-03 RX ADMIN — SODIUM CHLORIDE: 9 INJECTION, SOLUTION INTRAVENOUS at 13:03

## 2021-04-03 NOTE — PROGRESS NOTES
Patient tolerated Bamlanivimab and Etesevimab infusion and 1 hour post observation without incident  IV discontinued, catheter intact  Gauze applied to site  Discharge instructions reviewed with patient verbally  Copy of discharge instructions given to patient  Patient verbalized understanding of all discharge instructions  Patient discharged without incident  Patient has f/u on Monday with PCP

## 2021-04-03 NOTE — PLAN OF CARE
Problem: Potential for Falls  Goal: Patient will remain free of falls  Description: INTERVENTIONS:  - Assess patient frequently for physical needs  -  Identify cognitive and physical deficits and behaviors that affect risk of falls    -  [place] fall precautions as indicated by assessment   - Educate patient/family on patient safety including physical limitations  - Instruct patient to call for assistance with activity based on assessment  - Modify environment to reduce risk of injury  - Consider OT/PT consult to assist with strengthening/mobility  Outcome: Progressing

## 2021-04-03 NOTE — PROGRESS NOTES
Patient is here today for their Bamlanivimab and Etesevimab infusion  Reviewed EUA with patient  Patient verbalized understanding of EUA  Copy of EUA given to patient  All questions answered to patients satisfaction  IV started, good blood return, flushes freely  Patient tolerated well  Patient gave verbal consent to receive treatment  Verbal consent given to proceed

## 2021-04-05 ENCOUNTER — TELEMEDICINE (OUTPATIENT)
Dept: FAMILY MEDICINE CLINIC | Facility: CLINIC | Age: 61
End: 2021-04-05
Payer: COMMERCIAL

## 2021-04-05 VITALS — BODY MASS INDEX: 35 KG/M2 | HEIGHT: 64 IN | WEIGHT: 205 LBS

## 2021-04-05 DIAGNOSIS — U07.1 COVID-19 VIRUS INFECTION: Primary | ICD-10-CM

## 2021-04-05 PROCEDURE — 3008F BODY MASS INDEX DOCD: CPT | Performed by: NURSE PRACTITIONER

## 2021-04-05 PROCEDURE — 99213 OFFICE O/P EST LOW 20 MIN: CPT | Performed by: FAMILY MEDICINE

## 2021-04-05 NOTE — PROGRESS NOTES
Virtual Regular Visit      Assessment/Plan:    Problem List Items Addressed This Visit        Other    COVID-19 virus infection - Primary               Reason for visit is   Chief Complaint   Patient presents with    Virtual Regular Visit     follow-up infusion        Encounter provider Emmanuel Gordon MD    Provider located at [address] [place] [phone]      Recent Visits  Date Type Provider Dept   04/02/21 Telemedicine Emmanuel Gordon MD [facility]   04/02/21 Telephone Audrey Dickerson [facility] [address] [facility] recent visits within past 7 days and meeting all other requirements     Today's Visits  Date Type Provider Dept   04/05/21 Telemedicine Emmanuel Gordon MD [facility] [address] [facility] today's visits and meeting all other requirements     Future Appointments  No visits were found meeting these conditions  Showing future appointments within next 150 days and meeting all other requirements        The patient was identified by name and date of birth  Alli Allen was informed that this is a telemedicine visit and that the visit is being conducted through [facility] and patient was informed that this is not a secure, HIPAA-compliant platform  She agrees to proceed     My office door was closed  No one else was in the room  She acknowledged consent and understanding of privacy and security of the video platform  The patient has agreed to participate and understands they can discontinue the visit at any time  Patient is aware this is a billable service  Subjective  Alli Allen is a 62 y o  female s/p monoclonal AB infusion         HPI     Past Medical History:   Diagnosis Date    Anxiety     Disease of thyroid gland        Past Surgical History:   Procedure Laterality Date    BREAST BIOPSY Right     benign       Current Outpatient Medications   Medication Sig Dispense Refill    atorvastatin (LIPITOR) 10 mg tablet Take 1 tablet (10 mg total) by mouth daily (Patient not taking: Reported on 5/6/2020) 90 tablet 1    cyanocobalamin (VITAMIN B-12) 100 mcg tablet Take 100 mcg by mouth daily        levothyroxine 100 mcg tablet Take 1 tablet (100 mcg total) by mouth daily 90 tablet 1    Magnesium Oxide 140 MG CAPS Take 140 mg by mouth daily        Probiotic Product (PROBIOTIC-10) CAPS Take 1 tablet by mouth daily         No current facility-administered medications for this visit  Facility-Administered Medications Ordered in Other Visits   Medication Dose Route Frequency Provider Last Rate Last Admin    acetaminophen (TYLENOL) tablet 650 mg  650 mg Oral Once PRN Allison Valenzuela MD        albuterol (PROVENTIL HFA,VENTOLIN HFA) inhaler 3 puff  3 puff Inhalation Once PRN Allison Valenzuela MD            Allergies   Allergen Reactions    Sulfa Antibiotics Anaphylaxis       Review of Systems   Constitutional: Positive for fatigue  Negative for fever and unexpected weight change  HENT: Negative for congestion, sinus pain and sore throat  Eyes: Negative for visual disturbance  Respiratory: Negative for shortness of breath and wheezing  Cardiovascular: Negative for chest pain and palpitations  Gastrointestinal: Negative for abdominal pain, nausea and vomiting  Musculoskeletal: Positive for myalgias  Negative for arthralgias  Neurological: Negative for syncope, weakness and numbness  Psychiatric/Behavioral: Negative  Negative for confusion, dysphoric mood and suicidal ideas  Video Exam    Vitals:    04/05/21 0954   Weight: 93 kg (205 lb)   Height: 5' 4" (1 626 m)       Physical Exam  Pulmonary:      Effort: Pulmonary effort is normal           I spent 15 minutes directly with the patient during this visit      VIRTUAL VISIT DISCLAIMER    Rodolfo Medrano acknowledges that she has consented to an online visit or consultation   She understands that the online visit is based solely on information provided by her, and that, in the absence of a face-to-face physical evaluation by the physician, the diagnosis she receives is both limited and provisional in terms of accuracy and completeness  This is not intended to replace a full medical face-to-face evaluation by the physician  Josh Asher understands and accepts these terms

## 2021-04-07 LAB
BACTERIA BLD CULT: NORMAL
BACTERIA BLD CULT: NORMAL

## 2021-04-12 ENCOUNTER — TELEMEDICINE (OUTPATIENT)
Dept: FAMILY MEDICINE CLINIC | Facility: CLINIC | Age: 61
End: 2021-04-12
Payer: COMMERCIAL

## 2021-04-12 DIAGNOSIS — U07.1 COVID-19: Primary | ICD-10-CM

## 2021-04-12 PROCEDURE — 99213 OFFICE O/P EST LOW 20 MIN: CPT | Performed by: NURSE PRACTITIONER

## 2021-04-12 PROCEDURE — 1036F TOBACCO NON-USER: CPT | Performed by: NURSE PRACTITIONER

## 2021-04-12 NOTE — PROGRESS NOTES
COVID-19 Outpatient Progress Note    Assessment/Plan:    Problem List Items Addressed This Visit     None      Visit Diagnoses     COVID-19    -  Primary         Disposition:     I recommended continued isolation until at least 24 hours have passed since recovery defined as resolution of fever without the use of fever-reducing medications AND improvement in COVID symptoms AND 10 days have passed since onset of symptoms (or 10 days have passed since date of first positive viral diagnostic test for asymptomatic patients)  As long as symptoms keep improving and you remain fever free isolation can end tomorrow 4/3/21 which will be 10 days from onset of symptoms  Call with any questions or concerns  I have spent 15 minutes directly with the patient  Greater than 50% of this time was spent in counseling/coordination of care regarding: instructions for management and patient and family education  Encounter provider BRIDGER Singleton    Provider located at [address] [place] [phone]    Recent Visits  Date Type Provider Dept   04/05/21 Telemedicine Fatoumata Hinds MD [facility] [address] [place] [address] One recent visits within past 7 days and meeting all other requirements     Today's Visits  Date Type Provider Dept   04/12/21 Telemedicine BRIDGER Baltazar [facility]   Showing today's visits and meeting all other requirements     Future Appointments  No visits were found meeting these conditions  Showing future appointments within next 150 days and meeting all other requirements      This virtual check-in was done via [employer] and patient was informed that this is not a secure, HIPAA-compliant platform  She agrees to proceed  Patient agrees to participate in a virtual check in via telephone or video visit instead of presenting to the office to address urgent/immediate medical needs   Patient is aware this is a billable service  After connecting through [facility], the patient was identified by name and date of birth  Joseph Mckoy was informed that this was a telemedicine visit and that the exam was being conducted confidentially over secure lines  My office door was closed  No one else was in the room  Joseph Mckoy acknowledged consent and understanding of privacy and security of the telemedicine visit  I informed the patient that I have reviewed her record in Epic and presented the opportunity for her to ask any questions regarding the visit today  The patient agreed to participate  Subjective:   Joseph Mckoy is a 62 y o  female who has been screened for COVID-19  Symptom change since last report: improving  Patient's symptoms include fatigue and cough  Patient denies fever, chills, malaise, congestion, rhinorrhea, sore throat, anosmia, loss of taste, shortness of breath, chest tightness, abdominal pain, nausea, vomiting, diarrhea, myalgias and headaches  Mando Rosario has been staying home and has isolated themselves in her home  She is taking care to not share personal items and is cleaning all surfaces that are touched often, like counters, tabletops, and doorknobs using household cleaning sprays or wipes  She is wearing a mask when she leaves her room       Date of symptom onset: 4/2/2021  Date of positive COVID-19 PCR: 4/2/2021    Lab Results   Component Value Date    SARSCOV2 Positive (A) 04/02/2021     Past Medical History:   Diagnosis Date    Anxiety     Disease of thyroid gland      Past Surgical History:   Procedure Laterality Date    BREAST BIOPSY Right     benign     Current Outpatient Medications   Medication Sig Dispense Refill    atorvastatin (LIPITOR) 10 mg tablet Take 1 tablet (10 mg total) by mouth daily (Patient not taking: Reported on 5/6/2020) 90 tablet 1    cyanocobalamin (VITAMIN B-12) 100 mcg tablet Take 100 mcg by mouth daily        levothyroxine 100 mcg tablet Take 1 tablet (100 mcg total) by mouth daily 90 tablet 1    Magnesium Oxide 140 MG CAPS Take 140 mg by mouth daily        Probiotic Product (PROBIOTIC-10) CAPS Take 1 tablet by mouth daily         No current facility-administered medications for this visit  Allergies   Allergen Reactions    Sulfa Antibiotics Anaphylaxis       Review of Systems   Constitutional: Positive for fatigue  Negative for chills and fever  HENT: Negative for congestion, rhinorrhea and sore throat  Respiratory: Positive for cough  Negative for chest tightness and shortness of breath  Gastrointestinal: Negative for abdominal pain, diarrhea, nausea and vomiting  Musculoskeletal: Negative for myalgias  Neurological: Negative for headaches  Objective: There were no vitals filed for this visit  Physical Exam  Constitutional:       Appearance: Normal appearance  She is not ill-appearing  Pulmonary:      Effort: Pulmonary effort is normal  No respiratory distress  Skin:     Coloration: Skin is not pale  Findings: No erythema  Neurological:      Mental Status: She is alert and oriented to person, place, and time  Psychiatric:         Mood and Affect: Mood normal          Behavior: Behavior normal        VIRTUAL VISIT DISCLAIMER    Tae Ventura acknowledges that she has consented to an online visit or consultation  She understands that the online visit is based solely on information provided by her, and that, in the absence of a face-to-face physical evaluation by the physician, the diagnosis she receives is both limited and provisional in terms of accuracy and completeness  This is not intended to replace a full medical face-to-face evaluation by the physician  Tae Ventura understands and accepts these terms

## 2021-06-11 DIAGNOSIS — E03.9 HYPOTHYROIDISM, UNSPECIFIED TYPE: ICD-10-CM

## 2021-06-14 RX ORDER — LEVOTHYROXINE SODIUM 0.1 MG/1
100 TABLET ORAL DAILY
Qty: 30 TABLET | Refills: 0 | Status: SHIPPED | OUTPATIENT
Start: 2021-06-14 | End: 2021-07-12 | Stop reason: SDUPTHER

## 2021-06-14 NOTE — TELEPHONE ENCOUNTER
LOOKS LIKE NEEDS TSH LEVELS CHECKED  LAST LABS 7/12/2019 FOR THYROID    MSG LEFT TO CB TO FIND OUT WHAT LAB SHE USES  NEEDS ANNUAL PE AS WELL  NEEDS CMP, LIPID, TSH    APPROVE 30 DAYS ONLY WHILE WE WAIT FOR LABS/PE TO BE SCHEDULED

## 2021-06-25 ENCOUNTER — TELEPHONE (OUTPATIENT)
Dept: FAMILY MEDICINE CLINIC | Facility: CLINIC | Age: 61
End: 2021-06-25

## 2021-06-25 DIAGNOSIS — Z13.220 LIPID SCREENING: Primary | ICD-10-CM

## 2021-06-25 DIAGNOSIS — Z13.228 SCREENING FOR METABOLIC DISORDER: ICD-10-CM

## 2021-06-25 DIAGNOSIS — E03.9 HYPOTHYROIDISM, UNSPECIFIED TYPE: ICD-10-CM

## 2021-06-25 NOTE — TELEPHONE ENCOUNTER
Pt is requesting for the thyroid lab to be ordered     Send to   [employer]    Pt #  [id number]

## 2021-07-09 LAB
ALBUMIN SERPL-MCNC: 4.7 G/DL (ref 3.8–4.9)
ALBUMIN/GLOB SERPL: 2 {RATIO} (ref 1.2–2.2)
ALP SERPL-CCNC: 104 IU/L (ref 48–121)
ALT SERPL-CCNC: 33 IU/L (ref 0–32)
AST SERPL-CCNC: 32 IU/L (ref 0–40)
BILIRUB SERPL-MCNC: 0.3 MG/DL (ref 0–1.2)
BUN SERPL-MCNC: 12 MG/DL (ref 8–27)
BUN/CREAT SERPL: 16 (ref 12–28)
CALCIUM SERPL-MCNC: 9.6 MG/DL (ref 8.7–10.3)
CHLORIDE SERPL-SCNC: 104 MMOL/L (ref 96–106)
CHOLEST SERPL-MCNC: 281 MG/DL (ref 100–199)
CHOLEST/HDLC SERPL: 6.2 RATIO (ref 0–4.4)
CO2 SERPL-SCNC: 23 MMOL/L (ref 20–29)
CREAT SERPL-MCNC: 0.73 MG/DL (ref 0.57–1)
GLOBULIN SER-MCNC: 2.4 G/DL (ref 1.5–4.5)
GLUCOSE SERPL-MCNC: 112 MG/DL (ref 65–99)
HDLC SERPL-MCNC: 45 MG/DL
LDLC SERPL CALC-MCNC: 199 MG/DL (ref 0–99)
POTASSIUM SERPL-SCNC: 4.4 MMOL/L (ref 3.5–5.2)
PROT SERPL-MCNC: 7.1 G/DL (ref 6–8.5)
SL AMB EGFR AFRICAN AMERICAN: 104 ML/MIN/1.73
SL AMB EGFR NON AFRICAN AMERICAN: 90 ML/MIN/1.73
SL AMB VLDL CHOLESTEROL CALC: 37 MG/DL (ref 5–40)
SODIUM SERPL-SCNC: 141 MMOL/L (ref 134–144)
TRIGL SERPL-MCNC: 192 MG/DL (ref 0–149)
TSH SERPL DL<=0.005 MIU/L-ACNC: 2.62 UIU/ML (ref 0.45–4.5)

## 2021-07-12 ENCOUNTER — TELEPHONE (OUTPATIENT)
Dept: FAMILY MEDICINE CLINIC | Facility: CLINIC | Age: 61
End: 2021-07-12

## 2021-07-12 DIAGNOSIS — E78.00 HYPERCHOLESTEREMIA: Primary | ICD-10-CM

## 2021-07-12 DIAGNOSIS — E03.9 HYPOTHYROIDISM, UNSPECIFIED TYPE: ICD-10-CM

## 2021-07-12 NOTE — TELEPHONE ENCOUNTER
----- Message from [address] [place] [address],[address] sent at 7/12/2021 10:43 AM EDT -----  Call with labs  TSH is normal  Cholesterol is high- lifestyle modifications-diet and exercise  Repeat in 6 months  If still elevated would recommend patient restarting Atorvastatin 10 mg daily

## 2021-07-13 RX ORDER — LEVOTHYROXINE SODIUM 0.1 MG/1
100 TABLET ORAL DAILY
Qty: 30 TABLET | Refills: 0 | Status: SHIPPED | OUTPATIENT
Start: 2021-07-13 | End: 2021-07-16 | Stop reason: SDUPTHER

## 2021-07-13 NOTE — TELEPHONE ENCOUNTER
Pt due for physical    Last refill on 06/14    PT NEEDS ROUTINE PHYSICAL AND THYROID LEVELS CHECKED    Send 30 days

## 2021-07-16 ENCOUNTER — OFFICE VISIT (OUTPATIENT)
Dept: FAMILY MEDICINE CLINIC | Facility: CLINIC | Age: 61
End: 2021-07-16
Payer: COMMERCIAL

## 2021-07-16 VITALS
BODY MASS INDEX: 36.09 KG/M2 | WEIGHT: 211.4 LBS | HEART RATE: 89 BPM | SYSTOLIC BLOOD PRESSURE: 121 MMHG | OXYGEN SATURATION: 97 % | HEIGHT: 64 IN | DIASTOLIC BLOOD PRESSURE: 76 MMHG

## 2021-07-16 DIAGNOSIS — E78.00 HYPERCHOLESTEREMIA: ICD-10-CM

## 2021-07-16 DIAGNOSIS — Z00.00 ANNUAL PHYSICAL EXAM: Primary | ICD-10-CM

## 2021-07-16 DIAGNOSIS — E03.9 HYPOTHYROIDISM, UNSPECIFIED TYPE: ICD-10-CM

## 2021-07-16 DIAGNOSIS — E03.9 ACQUIRED HYPOTHYROIDISM: ICD-10-CM

## 2021-07-16 PROCEDURE — 99396 PREV VISIT EST AGE 40-64: CPT | Performed by: NURSE PRACTITIONER

## 2021-07-16 PROCEDURE — 1036F TOBACCO NON-USER: CPT | Performed by: NURSE PRACTITIONER

## 2021-07-16 PROCEDURE — 99213 OFFICE O/P EST LOW 20 MIN: CPT | Performed by: NURSE PRACTITIONER

## 2021-07-16 RX ORDER — LEVOTHYROXINE SODIUM 0.1 MG/1
100 TABLET ORAL DAILY
Qty: 90 TABLET | Refills: 1 | Status: SHIPPED | OUTPATIENT
Start: 2021-07-16 | End: 2022-02-15 | Stop reason: SDUPTHER

## 2021-07-16 NOTE — PROGRESS NOTES
ADULT ANNUAL [address] [place] [address]    NAME: Patti Delgado  AGE: 62 y o  SEX: female  : 1960     DATE: 2021     Assessment and Plan:     Problem List Items Addressed This Visit        Endocrine    Acquired hypothyroidism    Relevant Medications    levothyroxine 100 mcg tablet       Other    Hypercholesteremia      Other Visit Diagnoses     Annual physical exam    -  Primary    Hypothyroidism, unspecified type        Relevant Medications    levothyroxine 100 mcg tablet          Immunizations and preventive care screenings were discussed with patient today  Appropriate education was printed on patient's after visit summary  Counseling:  Alcohol/drug use: discussed moderation in alcohol intake, the recommendations for healthy alcohol use, and avoidance of illicit drug use  Dental Health: discussed importance of regular tooth brushing, flossing, and dental visits  Injury prevention: discussed safety/seat belts, safety helmets, smoke detectors, carbon dioxide detectors, and smoking near bedding or upholstery  Sexual health: discussed sexually transmitted diseases, partner selection, use of condoms, avoidance of unintended pregnancy, and contraceptive alternatives  · Exercise: the importance of regular exercise/physical activity was discussed  Recommend exercise 3-5 times per week for at least 30 minutes  BMI Counseling: Body mass index is 36 29 kg/m²  The BMI is above normal  Nutrition recommendations include decreasing portion sizes, encouraging healthy choices of fruits and vegetables, moderation in carbohydrate intake and increasing intake of lean protein  Exercise recommendations include exercising 3-5 times per week  No follow-ups on file       Chief Complaint:     Chief Complaint   Patient presents with    Physical Exam      History of Present Illness:     Adult Annual Physical   Patient here for a comprehensive physical exam  The patient reports no problems  Diet and Physical Activity  · Diet/Nutrition: well balanced diet  · Exercise: no formal exercise  Depression Screening  PHQ-9 Depression Screening    PHQ-9:   Frequency of the following problems over the past two weeks:      Little interest or pleasure in doing things: 0 - not at all  Feeling down, depressed, or hopeless: 0 - not at all  PHQ-2 Score: 0       General Health  · Sleep: sleeps well  · Hearing: normal - bilateral   · Vision: goes for regular eye exams  · Dental: regular dental visits  /GYN Health  · Patient is: postmenopausal       Review of Systems:     Review of Systems   Constitutional: Negative for activity change, appetite change, chills, diaphoresis, fatigue and fever  HENT: Negative for congestion, ear pain, facial swelling, postnasal drip, rhinorrhea, sinus pressure, sinus pain, sore throat, tinnitus and trouble swallowing  Eyes: Negative for photophobia, pain, discharge, redness, itching and visual disturbance  Respiratory: Negative for cough, chest tightness, shortness of breath and wheezing  Cardiovascular: Negative for chest pain, palpitations and leg swelling  Gastrointestinal: Negative for abdominal distention, abdominal pain, anal bleeding, blood in stool, constipation, diarrhea, nausea, rectal pain and vomiting  Endocrine: Negative for cold intolerance, heat intolerance, polydipsia, polyphagia and polyuria  Genitourinary: Negative for difficulty urinating, dysuria, flank pain, frequency, pelvic pain and urgency  Musculoskeletal: Negative for arthralgias, back pain, gait problem, joint swelling, myalgias, neck pain and neck stiffness  Skin: Negative for color change, pallor, rash and wound  Neurological: Negative for dizziness, tremors, seizures, syncope, facial asymmetry, speech difficulty, weakness, light-headedness, numbness and headaches  Hematological: Negative for adenopathy   Does not bruise/bleed easily  Psychiatric/Behavioral: Negative for agitation, behavioral problems, confusion, decreased concentration, dysphoric mood, hallucinations, self-injury, sleep disturbance and suicidal ideas  The patient is not nervous/anxious and is not hyperactive  Past Medical History:     Past Medical History:   Diagnosis Date    Anxiety     Disease of thyroid gland       Past Surgical History:     Past Surgical History:   Procedure Laterality Date    BREAST BIOPSY Right     benign      Social History:     Social History     Socioeconomic History    Marital status: /Civil Union     Spouse name: None    Number of children: None    Years of education: None    Highest education level: None   Occupational History    None   Tobacco Use    Smoking status: Never Smoker    Smokeless tobacco: Never Used   Substance and Sexual Activity    Alcohol use: Yes     Comment: socially    Drug use: No    Sexual activity: None   Other Topics Concern    None   Social History Narrative    None     Social Determinants of Health     Financial Resource Strain:     Difficulty of Paying Living Expenses:    Food Insecurity:     Worried About Running Out of Food in the Last Year:     Ran Out of Food in the Last Year:    Transportation Needs:     Lack of Transportation (Medical):      Lack of Transportation (Non-Medical):    Physical Activity:     Days of Exercise per Week:     Minutes of Exercise per Session:    Stress:     Feeling of Stress :    Social Connections:     Frequency of Communication with Friends and Family:     Frequency of Social Gatherings with Friends and Family:     Attends [employer] Services:     Active Member of Clubs or Organizations:     Attends Club or Organization Meetings:     Marital Status:    Intimate Partner Violence:     Fear of Current or Ex-Partner:     Emotionally Abused:     Physically Abused:     Sexually Abused:       Family History:     Family History Problem Relation Age of Onset    Heart failure Mother     Diabetes Mother     Cancer Father     Pancreatic cancer Father     No Known Problems Maternal Grandmother     No Known Problems Maternal Grandfather     No Known Problems Paternal Grandmother     No Known Problems Paternal Grandfather     Uterine cancer Sister 58    No Known Problems Sister     No Known Problems Sister     No Known Problems Sister       Current Medications:     Current Outpatient Medications   Medication Sig Dispense Refill    atorvastatin (LIPITOR) 10 mg tablet Take 1 tablet (10 mg total) by mouth daily (Patient not taking: Reported on 5/6/2020) 90 tablet 1    cyanocobalamin (VITAMIN B-12) 100 mcg tablet Take 100 mcg by mouth daily        levothyroxine 100 mcg tablet Take 1 tablet (100 mcg total) by mouth daily 90 tablet 1    Magnesium Oxide 140 MG CAPS Take 140 mg by mouth daily        Probiotic Product (PROBIOTIC-10) CAPS Take 1 tablet by mouth daily         No current facility-administered medications for this visit  Allergies: Allergies   Allergen Reactions    Sulfa Antibiotics Anaphylaxis      Physical Exam:     /76 (BP Location: Left arm, Patient Position: Sitting, Cuff Size: Large)   Pulse 89   Ht 5' 4" (1 626 m)   Wt 95 9 kg (211 lb 6 4 oz)   SpO2 97%   BMI 36 29 kg/m²     Physical Exam  Vitals and nursing note reviewed  Constitutional:       General: She is not in acute distress  Appearance: Normal appearance  She is normal weight  She is not ill-appearing, toxic-appearing or diaphoretic  HENT:      Head: Normocephalic  Right Ear: Tympanic membrane, ear canal and external ear normal  There is no impacted cerumen  Left Ear: Tympanic membrane, ear canal and external ear normal  There is no impacted cerumen  Nose: Nose normal  No congestion or rhinorrhea  Mouth/Throat:      Mouth: Mucous membranes are moist       Pharynx: Oropharynx is clear   No oropharyngeal exudate or posterior oropharyngeal erythema  Eyes:      General: No scleral icterus  Right eye: No discharge  Left eye: No discharge  Extraocular Movements: Extraocular movements intact  Conjunctiva/sclera: Conjunctivae normal       Pupils: Pupils are equal, round, and reactive to light  Neck:      Vascular: No carotid bruit  Cardiovascular:      Rate and Rhythm: Normal rate and regular rhythm  Pulses: Normal pulses  Heart sounds: Normal heart sounds  No murmur heard  No friction rub  No gallop  Pulmonary:      Effort: Pulmonary effort is normal  No respiratory distress  Breath sounds: Normal breath sounds  No stridor  No wheezing, rhonchi or rales  Chest:      Chest wall: No tenderness  Abdominal:      General: Abdomen is flat  Bowel sounds are normal  There is no distension  Palpations: Abdomen is soft  There is no mass  Tenderness: There is no abdominal tenderness  There is no right CVA tenderness, left CVA tenderness, guarding or rebound  Hernia: No hernia is present  Musculoskeletal:         General: No swelling, tenderness, deformity or signs of injury  Normal range of motion  Cervical back: Normal range of motion and neck supple  No rigidity  No muscular tenderness  Right lower leg: No edema  Left lower leg: No edema  Lymphadenopathy:      Cervical: No cervical adenopathy  Skin:     General: Skin is warm  Coloration: Skin is not jaundiced or pale  Findings: No bruising, erythema, lesion or rash  Neurological:      General: No focal deficit present  Mental Status: She is alert and oriented to person, place, and time  Cranial Nerves: No cranial nerve deficit  Sensory: No sensory deficit  Motor: No weakness        Coordination: Coordination normal       Gait: Gait normal       Deep Tendon Reflexes: Reflexes normal    Psychiatric:         Mood and Affect: Mood normal          Behavior: Behavior normal          Thought Content:  Thought content normal          Judgment: Judgment normal         PHQ-9 Depression Screening    PHQ-9:   Frequency of the following problems over the past two weeks:      Little interest or pleasure in doing things: 0 - not at all  Feeling down, depressed, or hopeless: 0 - not at all  PHQ-2 Score: 0           Mikala Fenton, [address] [place], [place] [address]mustapha

## 2021-07-16 NOTE — PROGRESS NOTES
[facility]        NAME: Roberto Quevedo is a 62 y o  female  : 1960    MRN: [id number]  DATE: 2021  TIME: 4:40 PM    Assessment and Plan   Annual physical exam [A75 83]  1  Annual physical exam     2  Acquired hypothyroidism     3  Hypercholesteremia     4  Hypothyroidism, unspecified type  levothyroxine 100 mcg tablet         Patient Instructions     Patient Instructions     Continue Levothyroxine 100 mcg daily  Diet/exercise as discussed for weight loss/high cholesterol  Wellness Visit for Adults   AMBULATORY CARE:   A wellness visit  is when you see your healthcare provider to get screened for health problems  Your healthcare provider will also give you advice on how to stay healthy  Write down your questions so you remember to ask them  Ask your healthcare provider how often you should have a wellness visit  What happens at a wellness visit:  Your healthcare provider will ask about your health, and your family history of health problems  This includes high blood pressure, heart disease, and cancer  He or she will ask if you have symptoms that concern you, if you smoke, and about your mood  You may also be asked about your intake of medicines, supplements, food, and alcohol  Any of the following may be done:  · Your weight  will be checked  Your height may also be checked so your body mass index (BMI) can be calculated  Your BMI shows if you are at a healthy weight  · Your blood pressure  and heart rate will be checked  Your temperature may also be checked  · Blood and urine tests  may be done  Blood tests may be done to check your cholesterol levels  Abnormal cholesterol levels increase your risk for heart disease and stroke  You may also need a blood or urine test to check for diabetes if you are at increased risk  Urine tests may be done to look for signs of an infection or kidney disease      · A physical exam  includes checking your heartbeat and lungs with a stethoscope  Your healthcare provider may also check your skin to look for sun damage  · Screening tests  may be recommended  A screening test is done to check for diseases that may not cause symptoms  The screening tests you may need depend on your age, gender, family history, and lifestyle habits  For example, colorectal screening may be recommended if you are 48years old or older  Screening tests you need if you are a woman:   · A Pap smear  is used to screen for cervical cancer  Pap smears are usually done every 3 to 5 years depending on your age  You may need them more often if you have had abnormal Pap smear test results in the past  Ask your healthcare provider how often you should have a Pap smear  · A mammogram  is an x-ray of your breasts to screen for breast cancer  Experts recommend mammograms every 2 years starting at age 48 years  You may need a mammogram at age 52 years or younger if you have an increased risk for breast cancer  Talk to your healthcare provider about when you should start having mammograms and how often you need them  Vaccines you may need:   · Get an influenza vaccine  every year  The influenza vaccine protects you from the flu  Several types of viruses cause the flu  The viruses change over time, so new vaccines are made each year  · Get a tetanus-diphtheria (Td) booster vaccine  every 10 years  This vaccine protects you against tetanus and diphtheria  Tetanus is a severe infection that may cause painful muscle spasms and lockjaw  Diphtheria is a severe bacterial infection that causes a thick covering in the back of your mouth and throat  · Get a human papillomavirus (HPV) vaccine  if you are female and aged 23 to 32 or male 23 to 24 and never received it  This vaccine protects you from HPV infection  HPV is the most common infection spread by sexual contact  HPV may also cause vaginal, penile, and anal cancers      · Get a pneumococcal vaccine  if you are aged 72 years or older  The pneumococcal vaccine is an injection given to protect you from pneumococcal disease  Pneumococcal disease is an infection caused by pneumococcal bacteria  The infection may cause pneumonia, meningitis, or an ear infection  · Get a shingles vaccine  if you are 60 or older, even if you have had shingles before  The shingles vaccine is an injection to protect you from the varicella-zoster virus  This is the same virus that causes chickenpox  Shingles is a painful rash that develops in people who had chickenpox or have been exposed to the virus  How to eat healthy:  My Plate is a model for planning healthy meals  It shows the types and amounts of foods that should go on your plate  Fruits and vegetables make up about half of your plate, and grains and protein make up the other half  A serving of dairy is included on the side of your plate  The amount of calories and serving sizes you need depends on your age, gender, weight, and height  Examples of healthy foods are listed below:  · Eat a variety of vegetables  such as dark green, red, and orange vegetables  You can also include canned vegetables low in sodium (salt) and frozen vegetables without added butter or sauces  · Eat a variety of fresh fruits , canned fruit in 100% juice, frozen fruit, and dried fruit  · Include whole grains  At least half of the grains you eat should be whole grains  Examples include whole-wheat bread, wheat pasta, brown rice, and whole-grain cereals such as oatmeal     · Eat a variety of protein foods such as seafood (fish and shellfish), lean meat, and poultry without skin (turkey and chicken)  Examples of lean meats include pork leg, shoulder, or tenderloin, and beef round, sirloin, tenderloin, and extra lean ground beef  Other protein foods include eggs and egg substitutes, beans, peas, soy products, nuts, and seeds      · Choose low-fat dairy products such as skim or 1% milk or low-fat yogurt, cheese, and cottage cheese  · Limit unhealthy fats  such as butter, hard margarine, and shortening  Exercise:  Exercise at least 30 minutes per day on most days of the week  Some examples of exercise include walking, biking, dancing, and swimming  You can also fit in more physical activity by taking the stairs instead of the elevator or parking farther away from stores  Include muscle strengthening activities 2 days each week  Regular exercise provides many health benefits  It helps you manage your weight, and decreases your risk for type 2 diabetes, heart disease, stroke, and high blood pressure  Exercise can also help improve your mood  Ask your healthcare provider about the best exercise plan for you  General health and safety guidelines:   · Do not smoke  Nicotine and other chemicals in cigarettes and cigars can cause lung damage  Ask your healthcare provider for information if you currently smoke and need help to quit  E-cigarettes or smokeless tobacco still contain nicotine  Talk to your healthcare provider before you use these products  · Limit alcohol  A drink of alcohol is 12 ounces of beer, 5 ounces of wine, or 1½ ounces of liquor  · Lose weight, if needed  Being overweight increases your risk of certain health conditions  These include heart disease, high blood pressure, type 2 diabetes, and certain types of cancer  · Protect your skin  Do not sunbathe or use tanning beds  Use sunscreen with a SPF 15 or higher  Apply sunscreen at least 15 minutes before you go outside  Reapply sunscreen every 2 hours  Wear protective clothing, hats, and sunglasses when you are outside  · Drive safely  Always wear your seatbelt  Make sure everyone in your car wears a seatbelt  A seatbelt can save your life if you are in an accident  Do not use your cell phone when you are driving  This could distract you and cause an accident  Pull over if you need to make a call or send a text message      · Practice safe sex   Use latex condoms if are sexually active and have more than one partner  Your healthcare provider may recommend screening tests for sexually transmitted infections (STIs)  · Wear helmets, lifejackets, and protective gear  Always wear a helmet when you ride a bike or motorcycle, go skiing, or play sports that could cause a head injury  Wear protective equipment when you play sports  Wear a lifejacket when you are on a boat or doing water sports  © Copyright [address] Information is for End User's use only and may not be sold, redistributed or otherwise used for commercial purposes  All illustrations and images included in CareNotes® are the copyrighted property of A D A M , Inc  or [address] [place] [address]  The above information is an  only  It is not intended as medical advice for individual conditions or treatments  Talk to your doctor, nurse or pharmacist before following any medical regimen to see if it is safe and effective for you  Chief Complaint     Chief Complaint   Patient presents with    Physical Exam         History of Present Illness       Medication management  Patient take Levothyroxine 100 ng daily  Last TSH was normal  Cholesterol is high  Patient stopped taking atorvastatin  She is going to eat healthy and exercise  She has no complaints  Denies chest pain, no shortness of breath  Review of Systems   Review of Systems   Constitutional: Negative for activity change, appetite change, chills, diaphoresis, fatigue and fever  HENT: Negative for congestion, facial swelling, hearing loss, rhinorrhea, sinus pressure, sinus pain, sneezing, sore throat and voice change  Eyes: Negative for photophobia, pain, discharge, redness, itching and visual disturbance  Respiratory: Negative for cough, choking, chest tightness, shortness of breath, wheezing and stridor  Cardiovascular: Negative for chest pain, palpitations and leg swelling     Gastrointestinal: Negative for abdominal distention, abdominal pain, blood in stool, constipation, diarrhea, nausea and vomiting  Endocrine: Negative for polydipsia, polyphagia and polyuria  Genitourinary: Negative for difficulty urinating, dysuria, frequency and urgency  Musculoskeletal: Negative for arthralgias, back pain, gait problem, joint swelling, myalgias, neck pain and neck stiffness  Skin: Negative for color change, rash and wound  Neurological: Negative for dizziness, syncope, speech difficulty, weakness, light-headedness and headaches  Hematological: Negative for adenopathy  Does not bruise/bleed easily  Psychiatric/Behavioral: Negative for agitation, behavioral problems, confusion, decreased concentration, dysphoric mood, hallucinations, self-injury, sleep disturbance and suicidal ideas  The patient is not nervous/anxious and is not hyperactive            Current Medications       Current Outpatient Medications:     atorvastatin (LIPITOR) 10 mg tablet, Take 1 tablet (10 mg total) by mouth daily (Patient not taking: Reported on 5/6/2020), Disp: 90 tablet, Rfl: 1    cyanocobalamin (VITAMIN B-12) 100 mcg tablet, Take 100 mcg by mouth daily  , Disp: , Rfl:     levothyroxine 100 mcg tablet, Take 1 tablet (100 mcg total) by mouth daily, Disp: 90 tablet, Rfl: 1    Magnesium Oxide 140 MG CAPS, Take 140 mg by mouth daily  , Disp: , Rfl:     Probiotic Product (PROBIOTIC-10) CAPS, Take 1 tablet by mouth daily  , Disp: , Rfl:     Current Allergies     Allergies as of 07/16/2021 - Reviewed 07/16/2021   Allergen Reaction Noted    Sulfa antibiotics Anaphylaxis 06/24/2016            The following portions of the patient's history were reviewed and updated as appropriate: allergies, current medications, past family history, past medical history, past social history, past surgical history and problem list      Past Medical History:   Diagnosis Date    Anxiety     Disease of thyroid gland        Past Surgical History: Procedure Laterality Date    BREAST BIOPSY Right     benign       Family History   Problem Relation Age of Onset    Heart failure Mother     Diabetes Mother     Cancer Father     Pancreatic cancer Father     No Known Problems Maternal Grandmother     No Known Problems Maternal Grandfather     No Known Problems Paternal Grandmother     No Known Problems Paternal Grandfather     Uterine cancer Sister 58    No Known Problems Sister     No Known Problems Sister     No Known Problems Sister          Medications have been verified  Objective   /76 (BP Location: Left arm, Patient Position: Sitting, Cuff Size: Large)   Pulse 89   Ht 5' 4" (1 626 m)   Wt 95 9 kg (211 lb 6 4 oz)   SpO2 97%   BMI 36 29 kg/m²        Physical Exam     Physical Exam  Vitals and nursing note reviewed  Constitutional:       General: She is not in acute distress  Appearance: Normal appearance  She is well-developed  She is not diaphoretic  HENT:      Head: Normocephalic  Neck:      Thyroid: No thyromegaly  Trachea: No tracheal deviation  Cardiovascular:      Rate and Rhythm: Normal rate and regular rhythm  Heart sounds: Normal heart sounds  No murmur heard  Pulmonary:      Effort: Pulmonary effort is normal  No respiratory distress  Breath sounds: Normal breath sounds  No wheezing  Musculoskeletal:         General: No tenderness or deformity  Normal range of motion  Cervical back: Normal range of motion and neck supple  No rigidity or tenderness  Lymphadenopathy:      Cervical: No cervical adenopathy  Skin:     General: Skin is warm and dry  Findings: No erythema or rash  Neurological:      Mental Status: She is alert and oriented to person, place, and time  Psychiatric:         Mood and Affect: Mood normal          Behavior: Behavior normal  Behavior is cooperative  Thought Content:  Thought content normal          Judgment: Judgment normal

## 2021-07-16 NOTE — PATIENT INSTRUCTIONS
Continue Levothyroxine 100 mcg daily  Diet/exercise as discussed for weight loss/high cholesterol  Wellness Visit for Adults   AMBULATORY CARE:   A wellness visit  is when you see your healthcare provider to get screened for health problems  Your healthcare provider will also give you advice on how to stay healthy  Write down your questions so you remember to ask them  Ask your healthcare provider how often you should have a wellness visit  What happens at a wellness visit:  Your healthcare provider will ask about your health, and your family history of health problems  This includes high blood pressure, heart disease, and cancer  He or she will ask if you have symptoms that concern you, if you smoke, and about your mood  You may also be asked about your intake of medicines, supplements, food, and alcohol  Any of the following may be done:  · Your weight  will be checked  Your height may also be checked so your body mass index (BMI) can be calculated  Your BMI shows if you are at a healthy weight  · Your blood pressure  and heart rate will be checked  Your temperature may also be checked  · Blood and urine tests  may be done  Blood tests may be done to check your cholesterol levels  Abnormal cholesterol levels increase your risk for heart disease and stroke  You may also need a blood or urine test to check for diabetes if you are at increased risk  Urine tests may be done to look for signs of an infection or kidney disease  · A physical exam  includes checking your heartbeat and lungs with a stethoscope  Your healthcare provider may also check your skin to look for sun damage  · Screening tests  may be recommended  A screening test is done to check for diseases that may not cause symptoms  The screening tests you may need depend on your age, gender, family history, and lifestyle habits  For example, colorectal screening may be recommended if you are 48years old or older      Screening tests you need if you are a woman:   · A Pap smear  is used to screen for cervical cancer  Pap smears are usually done every 3 to 5 years depending on your age  You may need them more often if you have had abnormal Pap smear test results in the past  Ask your healthcare provider how often you should have a Pap smear  · A mammogram  is an x-ray of your breasts to screen for breast cancer  Experts recommend mammograms every 2 years starting at age 48 years  You may need a mammogram at age 52 years or younger if you have an increased risk for breast cancer  Talk to your healthcare provider about when you should start having mammograms and how often you need them  Vaccines you may need:   · Get an influenza vaccine  every year  The influenza vaccine protects you from the flu  Several types of viruses cause the flu  The viruses change over time, so new vaccines are made each year  · Get a tetanus-diphtheria (Td) booster vaccine  every 10 years  This vaccine protects you against tetanus and diphtheria  Tetanus is a severe infection that may cause painful muscle spasms and lockjaw  Diphtheria is a severe bacterial infection that causes a thick covering in the back of your mouth and throat  · Get a human papillomavirus (HPV) vaccine  if you are female and aged 23 to 32 or male 23 to 24 and never received it  This vaccine protects you from HPV infection  HPV is the most common infection spread by sexual contact  HPV may also cause vaginal, penile, and anal cancers  · Get a pneumococcal vaccine  if you are aged 72 years or older  The pneumococcal vaccine is an injection given to protect you from pneumococcal disease  Pneumococcal disease is an infection caused by pneumococcal bacteria  The infection may cause pneumonia, meningitis, or an ear infection  · Get a shingles vaccine  if you are 60 or older, even if you have had shingles before  The shingles vaccine is an injection to protect you from the varicella-zoster virus  This is the same virus that causes chickenpox  Shingles is a painful rash that develops in people who had chickenpox or have been exposed to the virus  How to eat healthy:  My Plate is a model for planning healthy meals  It shows the types and amounts of foods that should go on your plate  Fruits and vegetables make up about half of your plate, and grains and protein make up the other half  A serving of dairy is included on the side of your plate  The amount of calories and serving sizes you need depends on your age, gender, weight, and height  Examples of healthy foods are listed below:  · Eat a variety of vegetables  such as dark green, red, and orange vegetables  You can also include canned vegetables low in sodium (salt) and frozen vegetables without added butter or sauces  · Eat a variety of fresh fruits , canned fruit in 100% juice, frozen fruit, and dried fruit  · Include whole grains  At least half of the grains you eat should be whole grains  Examples include whole-wheat bread, wheat pasta, brown rice, and whole-grain cereals such as oatmeal     · Eat a variety of protein foods such as seafood (fish and shellfish), lean meat, and poultry without skin (turkey and chicken)  Examples of lean meats include pork leg, shoulder, or tenderloin, and beef round, sirloin, tenderloin, and extra lean ground beef  Other protein foods include eggs and egg substitutes, beans, peas, soy products, nuts, and seeds  · Choose low-fat dairy products such as skim or 1% milk or low-fat yogurt, cheese, and cottage cheese  · Limit unhealthy fats  such as butter, hard margarine, and shortening  Exercise:  Exercise at least 30 minutes per day on most days of the week  Some examples of exercise include walking, biking, dancing, and swimming  You can also fit in more physical activity by taking the stairs instead of the elevator or parking farther away from stores   Include muscle strengthening activities 2 days each week  Regular exercise provides many health benefits  It helps you manage your weight, and decreases your risk for type 2 diabetes, heart disease, stroke, and high blood pressure  Exercise can also help improve your mood  Ask your healthcare provider about the best exercise plan for you  General health and safety guidelines:   · Do not smoke  Nicotine and other chemicals in cigarettes and cigars can cause lung damage  Ask your healthcare provider for information if you currently smoke and need help to quit  E-cigarettes or smokeless tobacco still contain nicotine  Talk to your healthcare provider before you use these products  · Limit alcohol  A drink of alcohol is 12 ounces of beer, 5 ounces of wine, or 1½ ounces of liquor  · Lose weight, if needed  Being overweight increases your risk of certain health conditions  These include heart disease, high blood pressure, type 2 diabetes, and certain types of cancer  · Protect your skin  Do not sunbathe or use tanning beds  Use sunscreen with a SPF 15 or higher  Apply sunscreen at least 15 minutes before you go outside  Reapply sunscreen every 2 hours  Wear protective clothing, hats, and sunglasses when you are outside  · Drive safely  Always wear your seatbelt  Make sure everyone in your car wears a seatbelt  A seatbelt can save your life if you are in an accident  Do not use your cell phone when you are driving  This could distract you and cause an accident  Pull over if you need to make a call or send a text message  · Practice safe sex  Use latex condoms if are sexually active and have more than one partner  Your healthcare provider may recommend screening tests for sexually transmitted infections (STIs)  · Wear helmets, lifejackets, and protective gear  Always wear a helmet when you ride a bike or motorcycle, go skiing, or play sports that could cause a head injury  Wear protective equipment when you play sports   Wear a lifejacket when you are on a boat or doing water sports  © Copyright [address] Information is for End User's use only and may not be sold, redistributed or otherwise used for commercial purposes  All illustrations and images included in CareNotes® are the copyrighted property of A D A M , Inc  or Josue Lanza   The above information is an  only  It is not intended as medical advice for individual conditions or treatments  Talk to your doctor, nurse or pharmacist before following any medical regimen to see if it is safe and effective for you

## 2021-07-30 ENCOUNTER — HOSPITAL ENCOUNTER (EMERGENCY)
Facility: HOSPITAL | Age: 61
Discharge: HOME | End: 2021-07-30
Attending: EMERGENCY MEDICINE
Payer: COMMERCIAL

## 2021-07-30 VITALS
TEMPERATURE: 97.6 F | HEIGHT: 64 IN | OXYGEN SATURATION: 96 % | RESPIRATION RATE: 15 BRPM | BODY MASS INDEX: 34.15 KG/M2 | HEART RATE: 85 BPM | WEIGHT: 200 LBS | SYSTOLIC BLOOD PRESSURE: 137 MMHG | DIASTOLIC BLOOD PRESSURE: 83 MMHG

## 2021-07-30 DIAGNOSIS — M54.50 ACUTE LOW BACK PAIN: Primary | ICD-10-CM

## 2021-07-30 DIAGNOSIS — M53.3 SACROILIAC JOINT PAIN: ICD-10-CM

## 2021-07-30 LAB
BILIRUB UR QL STRIP: NEGATIVE
CLARITY UR: CLEAR
COLOR UR: YELLOW
GLUCOSE UR STRIP-MCNC: NEGATIVE MG/DL
HGB UR QL STRIP.AUTO: NEGATIVE
KETONES UR STRIP-MCNC: NEGATIVE MG/DL
LEUKOCYTE ESTERASE UR QL STRIP: NEGATIVE
NITRITE UR QL STRIP: NEGATIVE
PH UR STRIP.AUTO: 6 [PH]
PROT UR STRIP-MCNC: NEGATIVE MG/DL
SP GR UR STRIP.AUTO: <=1.005 (ref 1–1.03)
UROBILINOGEN UR QL STRIP.AUTO: 0.2 E.U./DL

## 2021-07-30 PROCEDURE — 81003 URINALYSIS AUTO W/O SCOPE: CPT | Performed by: EMERGENCY MEDICINE

## 2021-07-30 PROCEDURE — 96372 THER/PROPH/DIAG INJ SC/IM: CPT

## 2021-07-30 PROCEDURE — 99284 EMERGENCY DEPT VISIT MOD MDM: CPT | Performed by: EMERGENCY MEDICINE

## 2021-07-30 PROCEDURE — 99283 EMERGENCY DEPT VISIT LOW MDM: CPT

## 2021-07-30 PROCEDURE — 51798 US URINE CAPACITY MEASURE: CPT

## 2021-07-30 RX ORDER — ACETAMINOPHEN 325 MG/1
975 TABLET ORAL ONCE
Status: COMPLETED | OUTPATIENT
Start: 2021-07-30 | End: 2021-07-30

## 2021-07-30 RX ORDER — KETOROLAC TROMETHAMINE 30 MG/ML
15 INJECTION, SOLUTION INTRAMUSCULAR; INTRAVENOUS ONCE
Status: COMPLETED | OUTPATIENT
Start: 2021-07-30 | End: 2021-07-30

## 2021-07-30 RX ORDER — LIDOCAINE 50 MG/G
1 PATCH TOPICAL ONCE
Status: DISCONTINUED | OUTPATIENT
Start: 2021-07-30 | End: 2021-07-30 | Stop reason: HOSPADM

## 2021-07-30 RX ADMIN — KETOROLAC TROMETHAMINE 15 MG: 30 INJECTION, SOLUTION INTRAMUSCULAR; INTRAVENOUS at 13:48

## 2021-07-30 RX ADMIN — ACETAMINOPHEN 975 MG: 325 TABLET, FILM COATED ORAL at 13:48

## 2021-07-30 RX ADMIN — LIDOCAINE 5% 1 PATCH: 700 PATCH TOPICAL at 13:48

## 2021-07-30 NOTE — DISCHARGE INSTRUCTIONS
Take 1000 mg of acetaminophen (Tylenol) with 400 mg of ibuprofen (Advil) every 6-8 hours as needed for pain  Lidocaine patches are available over-the-counter can be found in the back pain aisle of most pharmacies  Look for 4% lidocaine in the list of the active ingredients  These patches can be placed for 12 hours  After 12 hours, discard the patch  The next patch can be placed 12 hours later  Acute Low Back Pain   WHAT YOU NEED TO KNOW:   Acute low back pain is sudden discomfort that lasts up to 6 weeks and makes activity difficult  DISCHARGE INSTRUCTIONS:   Return to the emergency department if:   You have severe pain  You have sudden stiffness and heaviness on both buttocks down to both legs  You have numbness or weakness in one leg, or pain in both legs  You have numbness in your genital area or across your lower back  You cannot control your urine or bowel movements  Call your doctor if:   You have a fever  You have pain at night or when you rest     Your pain does not get better with treatment  You have pain that worsens when you cough or sneeze  You suddenly feel something pop or snap in your back  You have questions or concerns about your condition or care  Medicines: You may need any of the following:  NSAIDs , such as ibuprofen, help decrease swelling, pain, and fever  This medicine is available with or without a doctor's order  NSAIDs can cause stomach bleeding or kidney problems in certain people  If you take blood thinner medicine, always ask your healthcare provider if NSAIDs are safe for you  Always read the medicine label and follow directions  Acetaminophen  decreases pain and fever  It is available without a doctor's order  Ask how much to take and how often to take it  Follow directions   Read the labels of all other medicines you are using to see if they also contain acetaminophen, or ask your doctor or pharmacist  Acetaminophen can cause liver damage if not taken correctly  Do not use more than 4 grams (4,000 milligrams) total of acetaminophen in one day  Muscle relaxers  decrease pain by relaxing the muscles in your lower spine  Prescription pain medicine  may be given  Ask your healthcare provider how to take this medicine safely  Some prescription pain medicines contain acetaminophen  Do not take other medicines that contain acetaminophen without talking to your healthcare provider  Too much acetaminophen may cause liver damage  Prescription pain medicine may cause constipation  Ask your healthcare provider how to prevent or treat constipation  Self-care:   Stay active  as much as you can without causing more pain  Bed rest could make your back pain worse  Start with some light exercises, such as walking  Avoid heavy lifting until your pain is gone  Ask for more information about the activities or exercises that are right for you  Apply heat  on your back for 20 to 30 minutes every 2 hours for as many days as directed  Heat helps decrease pain and muscle spasms  Alternate heat and ice  Apply ice  on your back for 15 to 20 minutes every hour or as directed  Use an ice pack, or put crushed ice in a plastic bag  Cover it with a towel before you apply it to your skin  Ice helps prevent tissue damage and decreases swelling and pain  Prevent acute low back pain:   Use proper body mechanics  Bend at the hips and knees when you  objects  Do not bend from the waist  Use your leg muscles as you lift the load  Do not use your back  Keep the object close to your chest as you lift it  Try not to twist or lift anything above your waist     Change your position often when you stand for long periods of time  Rest one foot on a small box or footrest, and then switch to the other foot often  Try not to sit for long periods of time  When you do, sit in a straight-backed chair with your feet flat on the floor   Never reach, pull, or push while you are sitting  Do exercises that strengthen your back muscles  Warm up before you exercise  Ask your healthcare provider the best exercises for you  Maintain a healthy weight  Ask your healthcare provider what a healthy weight is for you  Ask him or her to help you create a weight loss plan if you are overweight  Follow up with your doctor as directed:  Return for a follow-up visit if you still have pain after 1 to 3 weeks of treatment  You may need to visit an orthopedist if your back pain lasts longer than 12 weeks  Write down your questions so you remember to ask them during your visits  © Copyright [employer] 2021 Information is for End User's use only and may not be sold, redistributed or otherwise used for commercial purposes  All illustrations and images included in CareNotes® are the copyrighted property of A D A M , Inc  or Josue Mata  The above information is an  only  It is not intended as medical advice for individual conditions or treatments  Talk to your doctor, nurse or pharmacist before following any medical regimen to see if it is safe and effective for you

## 2021-07-30 NOTE — Clinical Note
R[place] Blanc was seen and treated in our emergency department on 7/30/2021             no lifting greater than 10 lbs for 1 week    Diagnosis:     Pascale    She may return on this date: 08/02/2021         If you have any questions or concerns, please don't hesitate to call        Mora Townsend MD    ______________________________           _______________          _______________  Hospital Representative                              Date                                Time

## 2021-07-30 NOTE — ED PROVIDER NOTES
History  Chief Complaint   Patient presents with    Back Pain     progressively getting worse over the last two weeks  No known injury     62year old female presents for evaluation of bilateral lumbar back pain radiating to the lateral thigh bilaterally as well as the LLQ of the abdomen  Patient's symptoms began as lumbar back pain 2 weeks ago  Radiation to the LLQ began 2 days ago  She has had some associated loose stools  No fevers, chills, nausea or vomiting  No hematuria or dysuria  No history of renal stones in the past  Patient has been taking over-the-counter back pain relief medications without improvement with last dose taken 1-2 days ago  Pain worsens with movement and has made her job as a  more difficult  No prior injuries  History provided by:  Patient  Back Pain  Location:  Lumbar spine  Quality:  Shooting (throbbing)  Radiates to: lateral right and left thighs and LLQ  Pain severity:  Severe  Onset quality:  Gradual  Duration:  2 weeks  Timing:  Constant  Progression:  Worsening  Chronicity:  New  Relieved by:  Nothing  Worsened by: Movement  Ineffective treatments:  OTC medications  Associated symptoms: no abdominal pain, no chest pain, no fever and no headaches        Prior to Admission Medications   Prescriptions Last Dose Informant Patient Reported? Taking?    Magnesium Oxide 140 MG CAPS   Yes No   Sig: Take 140 mg by mouth daily     Probiotic Product (PROBIOTIC-10) CAPS   Yes No   Sig: Take 1 tablet by mouth daily     cyanocobalamin (VITAMIN B-12) 100 mcg tablet   Yes No   Sig: Take 100 mcg by mouth daily     levothyroxine 100 mcg tablet   No No   Sig: Take 1 tablet (100 mcg total) by mouth daily      Facility-Administered Medications: None       Past Medical History:   Diagnosis Date    Anxiety     Disease of thyroid gland        Past Surgical History:   Procedure Laterality Date    BREAST BIOPSY Right     benign       Family History   Problem Relation Age of Onset    Heart failure Mother     Diabetes Mother     Cancer Father     Pancreatic cancer Father     No Known Problems Maternal Grandmother     No Known Problems Maternal Grandfather     No Known Problems Paternal Grandmother     No Known Problems Paternal Grandfather     Uterine cancer Sister 58    No Known Problems Sister     No Known Problems Sister     No Known Problems Sister      I have reviewed and agree with the history as documented  E-Cigarette/Vaping    E-Cigarette Use Never User      E-Cigarette/Vaping Substances    Nicotine No     THC No     CBD No     Flavoring No     Other No     Unknown No      Social History     Tobacco Use    Smoking status: Never Smoker    Smokeless tobacco: Never Used   Vaping Use    Vaping Use: Never used   Substance Use Topics    Alcohol use: Yes     Comment: socially    Drug use: No       Review of Systems   Constitutional: Negative for appetite change, chills and fever  HENT: Negative for congestion and sore throat  Respiratory: Negative for cough, chest tightness and shortness of breath  Cardiovascular: Negative for chest pain and leg swelling  Gastrointestinal: Positive for diarrhea (loose stools) and nausea (secondary to worsening pain)  Negative for abdominal pain, constipation and vomiting  Musculoskeletal: Positive for back pain  Negative for myalgias  Skin: Negative for rash and wound  Neurological: Negative for dizziness, syncope and headaches  All other systems reviewed and are negative  Physical Exam  Physical Exam  Vitals and nursing note reviewed  Constitutional:       General: She is not in acute distress  Appearance: She is well-developed  She is not toxic-appearing or diaphoretic  HENT:      Head: Normocephalic and atraumatic  Right Ear: External ear normal       Left Ear: External ear normal       Nose: Nose normal    Eyes:      General: No scleral icterus    Pulmonary:      Effort: Pulmonary effort is normal  No respiratory distress  Abdominal:      General: There is no distension  Musculoskeletal:         General: No deformity  Normal range of motion  Comments: No midline C/T/L spine tenderness  No step offs or deformities  Tenderness left SI joint  Negative straight leg raise bilaterally  Skin:     General: Skin is warm and dry  Findings: No rash  Neurological:      General: No focal deficit present  Mental Status: She is alert  Sensory: Sensation is intact  Motor: Motor function is intact        Gait: Gait normal    Psychiatric:         Mood and Affect: Mood normal          Vital Signs  ED Triage Vitals   Temperature Pulse Respirations Blood Pressure SpO2   07/30/21 1337 07/30/21 1333 07/30/21 1333 07/30/21 1333 07/30/21 1333   97 6 °F (36 4 °C) 85 15 137/83 96 %      Temp Source Heart Rate Source Patient Position - Orthostatic VS BP Location FiO2 (%)   07/30/21 1337 -- -- -- --   Temporal          Pain Score       07/30/21 1333       9           Vitals:    07/30/21 1333   BP: 137/83   Pulse: 85         Visual Acuity      ED Medications  Medications   lidocaine (LIDODERM) 5 % patch 1 patch (1 patch Topical Medication Applied 7/30/21 1348)   ketorolac (TORADOL) injection 15 mg (15 mg Intramuscular Given 7/30/21 1348)   acetaminophen (TYLENOL) tablet 975 mg (975 mg Oral Given 7/30/21 1348)       Diagnostic Studies  Results Reviewed     Procedure Component Value Units Date/Time    UA w Reflex to Microscopic w Reflex to Culture [[id number]] Collected: 07/30/21 1349    Lab Status: Final result Specimen: Urine, Clean Catch Updated: 07/30/21 1401     Color, UA Yellow     Clarity, UA Clear     Specific Gravity, UA <=1 005     pH, UA 6 0     Leukocytes, UA Negative     Nitrite, UA Negative     Protein, UA Negative mg/dl      Glucose, UA Negative mg/dl      Ketones, UA Negative mg/dl      Urobilinogen, UA 0 2 E U /dl      Bilirubin, UA Negative     Blood, UA Negative                 No orders to display              Procedures  Procedures         ED Course                             SBIRT 22yo+      Most Recent Value   SBIRT (24 yo +)   In order to provide better care to our patients, we are screening all of our patients for alcohol and drug use  Would it be okay to ask you these screening questions? Yes Filed at: 07/30/2021 1859   Initial Alcohol Screen: US AUDIT-C    1  How often do you have a drink containing alcohol? 1 Filed at: 07/30/2021 0781   2  How many drinks containing alcohol do you have on a typical day you are drinking? 1 Filed at: 07/30/2021 0590   3b  FEMALE Any Age, or MALE 65+: How often do you have 4 or more drinks on one occassion? 0 Filed at: 07/30/2021 0634   Audit-C Score  2 Filed at: 07/30/2021 4480   JONI: How many times in the past year have you    Used an illegal drug or used a prescription medication for non-medical reasons? Never Filed at: 07/30/2021 7701                    [facility]  Number of Diagnoses or Management Options  Acute low back pain: new and requires workup  Sacroiliac joint pain: new and requires workup  Diagnosis management comments: 62year old female presents for evaluation of lumbar back pain worsening for 2 weeks with radiation to LLQ for 2 days  No tenderness on exam  No focal neurologic deficits  Post void residual 28 mL  UA unremarkable  Suspect musculoskeletal back pain with SI joint involvement  Comprehensive Spine referral placed  Symptomatic management  Discussed return precautions with patient         Amount and/or Complexity of Data Reviewed  Clinical lab tests: ordered and reviewed    Patient Progress  Patient progress: stable      Disposition  Final diagnoses:   Acute low back pain   Sacroiliac joint pain     Time reflects when diagnosis was documented in both MDM as applicable and the Disposition within this note     Time User Action Codes Description Comment    7/30/2021  2:27 PM Chintan Bee Add [M54 5] Acute low back pain 7/30/2021  2:27 PM Rickie Denver Add [M53 3] Sacroiliac joint pain       ED Disposition     ED Disposition Condition Date/Time Comment    Discharge Stable Fri Jul 30, 2021  2:27 PM Salome Singh discharge to home/self care              Follow-up Information     Follow up With Specialties Details Why Contact Info Additional Information    [facility]  As needed [address] Emergency Department Emergency Medicine Go to  If symptoms worsen, difficulty going to the bathroom, fever >100 4F, numbness or weakness [address] [address]  [address] [facility] Emergency [facility] Emergency Department, [address] [facility], [facility] [address]          Patient's Medications   Discharge Prescriptions    No medications on file         PDMP Review     None          ED Provider  Electronically Signed by           Ren Sonw MD  07/30/21 5126

## 2021-08-03 ENCOUNTER — VBI (OUTPATIENT)
Dept: FAMILY MEDICINE CLINIC | Facility: CLINIC | Age: 61
End: 2021-08-03

## 2021-08-03 NOTE — TELEPHONE ENCOUNTER
Jerod Hahn    ED Visit Information     Ed visit date: 7/30/2021  Diagnosis Description: Acute low back pain; Sacroiliac joint pain  In Network? Yes [address]  Discharge status: Home  Discharged with meds ? No  Number of ED visits to date: 2  ED Severity:n/a     Outreach Information    Outreach successful: Yes 1  Date letter mailed:n/a   Date [address] Coordination    Follow up appointment with pcp: no patient stated she will call pcp to set up appt   Transportation issues ? No    Value [employer] type: [address] PCP: Yes  Transportation: Self Transport  Called PCP first?: No  Feels able to call PCP for urgent problems ?: Yes  Understands what emergencies can be handled by PCP ?: Yes  Ever any problems getting appointment with PCP for minor emergency/urgency problems?: No  Practice Contacted Patient ?: No  Pt had ED follow up with pcp/staff ?: No    Seen for follow-up out of network ?: No  Urgent care Education?: Yes  08/03/2021 01:24 PM Phone (VBI) Melina Soto (Self) [phone] (H) Left Message By Modesto Harris -1st attempt lmom for patient to call back regarding most recent visit  08/03/2021 01:36 PM Phone (Incoming) Melina Soto (Self) [phone] (H) By Modesto Harris - received call back from patient stating doing well since discharged  Unable to contact pcp at the time she was having issues why pt went to ed  Patient  Stated she will call pcp to set up appointment when she is ready   No further questions

## 2021-08-04 ENCOUNTER — TELEPHONE (OUTPATIENT)
Dept: PHYSICAL THERAPY | Facility: OTHER | Age: 61
End: 2021-08-04

## 2021-08-04 NOTE — TELEPHONE ENCOUNTER
Nurse reached out to discuss recent referral entered for  [facility] program and offerings  Nurse reviewed triage, referral and PT eval     Patient declined to participate in the program at this time  Nurse respected decision and reminded she can   CB if needed  Agreed  Nurse confirmed patient has [facility] contact information and encouraged to call program back if needed in the future  Patient agreed and very appreciative of call and discussion  Nurse wished her well and referral closed per protocol

## 2021-09-28 ENCOUNTER — OFFICE VISIT (OUTPATIENT)
Dept: FAMILY MEDICINE CLINIC | Facility: CLINIC | Age: 61
End: 2021-09-28
Payer: COMMERCIAL

## 2021-09-28 VITALS
BODY MASS INDEX: 36.88 KG/M2 | WEIGHT: 216 LBS | SYSTOLIC BLOOD PRESSURE: 122 MMHG | HEIGHT: 64 IN | DIASTOLIC BLOOD PRESSURE: 82 MMHG | TEMPERATURE: 98.4 F | HEART RATE: 82 BPM | OXYGEN SATURATION: 98 %

## 2021-09-28 DIAGNOSIS — E78.00 HYPERCHOLESTEREMIA: Primary | ICD-10-CM

## 2021-09-28 DIAGNOSIS — R11.0 NAUSEA: ICD-10-CM

## 2021-09-28 DIAGNOSIS — R10.84 GENERALIZED ABDOMINAL PAIN: ICD-10-CM

## 2021-09-28 PROCEDURE — 3008F BODY MASS INDEX DOCD: CPT | Performed by: FAMILY MEDICINE

## 2021-09-28 PROCEDURE — 1036F TOBACCO NON-USER: CPT | Performed by: FAMILY MEDICINE

## 2021-09-28 PROCEDURE — 3725F SCREEN DEPRESSION PERFORMED: CPT | Performed by: FAMILY MEDICINE

## 2021-09-28 PROCEDURE — 99214 OFFICE O/P EST MOD 30 MIN: CPT | Performed by: FAMILY MEDICINE

## 2021-09-28 RX ORDER — PANTOPRAZOLE SODIUM 40 MG/1
40 TABLET, DELAYED RELEASE ORAL
Qty: 90 TABLET | Refills: 3 | Status: SHIPPED | OUTPATIENT
Start: 2021-09-28 | End: 2021-11-11

## 2021-09-28 RX ORDER — EZETIMIBE 10 MG/1
10 TABLET ORAL DAILY
Qty: 90 TABLET | Refills: 3 | Status: SHIPPED | OUTPATIENT
Start: 2021-09-28

## 2021-09-28 NOTE — PROGRESS NOTES
[facility]        NAME: Librado Gan is a 62 y o  female  : 1960    MRN: [id number]  DATE: 2021  TIME: 9:39 AM    Assessment and Plan   Hypercholesteremia [E78 00]  1  Hypercholesteremia  ezetimibe (ZETIA) 10 mg tablet   2  Nausea     3  Generalized abdominal pain  US abdomen complete    pantoprazole (PROTONIX) 40 mg tablet         Patient Instructions     Patient Instructions   U/S abdom---GI consult          Chief Complaint     Chief Complaint   Patient presents with    Nausea     pt states since getting the J&J shot in Aug 29, 2021 she has felt nausea since    Lump on [address] above nose         History of Present Illness       C/o nausea for 1 mo    Nausea  Associated symptoms include nausea  Pertinent negatives include no abdominal pain, arthralgias, chest pain, congestion, fatigue, fever, myalgias, numbness, sore throat, vomiting or weakness  Review of Systems   Review of Systems   Constitutional: Negative for fatigue, fever and unexpected weight change  HENT: Negative for congestion, sinus pain and sore throat  Eyes: Negative for visual disturbance  Respiratory: Negative for shortness of breath and wheezing  Cardiovascular: Negative for chest pain and palpitations  Gastrointestinal: Positive for nausea  Negative for abdominal pain and vomiting  Musculoskeletal: Negative  Negative for arthralgias and myalgias  Neurological: Negative for syncope, weakness and numbness  Psychiatric/Behavioral: Negative  Negative for confusion, dysphoric mood and suicidal ideas           Current Medications       Current Outpatient Medications:     cyanocobalamin (VITAMIN B-12) 100 mcg tablet, Take 100 mcg by mouth daily  , Disp: , Rfl:     levothyroxine 100 mcg tablet, Take 1 tablet (100 mcg total) by mouth daily, Disp: 90 tablet, Rfl: 1    Magnesium Oxide 140 MG CAPS, Take 140 mg by mouth daily  , Disp: , Rfl:     Probiotic Product (PROBIOTIC-10) CAPS, Take 1 tablet by mouth daily  , Disp: , Rfl:     ezetimibe (ZETIA) 10 mg tablet, Take 1 tablet (10 mg total) by mouth daily, Disp: 90 tablet, Rfl: 3    pantoprazole (PROTONIX) 40 mg tablet, Take 1 tablet (40 mg total) by mouth daily before breakfast, Disp: 90 tablet, Rfl: 3    Current Allergies     Allergies as of 09/28/2021 - Reviewed 09/28/2021   Allergen Reaction Noted    Sulfa antibiotics Anaphylaxis 06/24/2016    Statins Hives 09/28/2021            The following portions of the patient's history were reviewed and updated as appropriate: allergies, current medications, past family history, past medical history, past social history, past surgical history and problem list      Past Medical History:   Diagnosis Date    Anxiety     Disease of thyroid gland        Past Surgical History:   Procedure Laterality Date    BREAST BIOPSY Right     benign       Family History   Problem Relation Age of Onset    Heart failure Mother     Diabetes Mother     Cancer Father     Pancreatic cancer Father     No Known Problems Maternal Grandmother     No Known Problems Maternal Grandfather     No Known Problems Paternal Grandmother     No Known Problems Paternal Grandfather     Uterine cancer Sister 58    No Known Problems Sister     No Known Problems Sister     No Known Problems Sister          Medications have been verified  Objective   /82 (BP Location: Left arm, Patient Position: Sitting, Cuff Size: Adult)   Pulse 82   Temp 98 4 °F (36 9 °C) (Tympanic)   Ht 5' 4" (1 626 m)   Wt 98 kg (216 lb)   SpO2 98%   BMI 37 08 kg/m²        Physical Exam     Physical Exam  Constitutional:       Appearance: She is well-developed  HENT:      Right Ear: Ear canal normal  Tympanic membrane is not injected  Left Ear: Ear canal normal  Tympanic membrane is not injected  Nose: Nose normal    Eyes:      General:         Right eye: No discharge  Left eye: No discharge  Conjunctiva/sclera: Conjunctivae normal       Pupils: Pupils are equal, round, and reactive to light  Neck:      Thyroid: No thyromegaly  Cardiovascular:      Rate and Rhythm: Normal rate and regular rhythm  Heart sounds: Normal heart sounds  No murmur heard  Pulmonary:      Effort: Pulmonary effort is normal  No respiratory distress  Breath sounds: Normal breath sounds  No wheezing  Abdominal:      General: Bowel sounds are normal  There is no distension  Palpations: Abdomen is soft  Tenderness: There is no abdominal tenderness  Musculoskeletal:         General: Normal range of motion  Cervical back: Normal range of motion and neck supple  Lymphadenopathy:      Cervical: No cervical adenopathy  Skin:     General: Skin is warm and dry  Neurological:      Mental Status: She is alert and oriented to person, place, and time  She is not disoriented  Sensory: No sensory deficit  Gait: Gait normal       Deep Tendon Reflexes: Reflexes are normal and symmetric  Psychiatric:         Speech: Speech normal          Behavior: Behavior normal          Thought Content:  Thought content normal          Judgment: Judgment normal

## 2021-09-29 ENCOUNTER — HOSPITAL ENCOUNTER (OUTPATIENT)
Dept: ULTRASOUND IMAGING | Facility: HOSPITAL | Age: 61
Discharge: HOME | End: 2021-09-29
Payer: COMMERCIAL

## 2021-09-29 DIAGNOSIS — R10.84 GENERALIZED ABDOMINAL PAIN: ICD-10-CM

## 2021-09-29 PROCEDURE — 76700 US EXAM ABDOM COMPLETE: CPT

## 2021-10-04 ENCOUNTER — TELEPHONE (OUTPATIENT)
Dept: FAMILY MEDICINE CLINIC | Facility: CLINIC | Age: 61
End: 2021-10-04

## 2021-10-06 ENCOUNTER — TELEPHONE (OUTPATIENT)
Dept: FAMILY MEDICINE CLINIC | Facility: CLINIC | Age: 61
End: 2021-10-06

## 2021-10-21 ENCOUNTER — TELEPHONE (OUTPATIENT)
Dept: FAMILY MEDICINE CLINIC | Facility: CLINIC | Age: 61
End: 2021-10-21

## 2021-10-22 ENCOUNTER — HOSPITAL ENCOUNTER (EMERGENCY)
Facility: HOSPITAL | Age: 61
Discharge: HOME | End: 2021-10-22
Attending: EMERGENCY MEDICINE | Admitting: EMERGENCY MEDICINE
Payer: COMMERCIAL

## 2021-10-22 ENCOUNTER — APPOINTMENT (EMERGENCY)
Dept: CT IMAGING | Facility: HOSPITAL | Age: 61
End: 2021-10-22
Payer: COMMERCIAL

## 2021-10-22 VITALS
SYSTOLIC BLOOD PRESSURE: 140 MMHG | TEMPERATURE: 98 F | DIASTOLIC BLOOD PRESSURE: 74 MMHG | OXYGEN SATURATION: 97 % | RESPIRATION RATE: 17 BRPM | HEART RATE: 87 BPM

## 2021-10-22 DIAGNOSIS — R11.0 NAUSEA: Primary | ICD-10-CM

## 2021-10-22 DIAGNOSIS — K76.0 FATTY LIVER: ICD-10-CM

## 2021-10-22 LAB
ALBUMIN SERPL BCP-MCNC: 4 G/DL (ref 3.5–5)
ALP SERPL-CCNC: 106 U/L (ref 46–116)
ALT SERPL W P-5'-P-CCNC: 42 U/L (ref 12–78)
ANION GAP SERPL CALCULATED.3IONS-SCNC: 11 MMOL/L (ref 4–13)
AST SERPL W P-5'-P-CCNC: 28 U/L (ref 5–45)
BASOPHILS # BLD AUTO: 0.04 THOUSANDS/ΜL (ref 0–0.1)
BASOPHILS NFR BLD AUTO: 1 % (ref 0–1)
BILIRUB SERPL-MCNC: 0.6 MG/DL (ref 0.2–1)
BUN SERPL-MCNC: 7 MG/DL (ref 5–25)
CALCIUM SERPL-MCNC: 9 MG/DL (ref 8.3–10.1)
CHLORIDE SERPL-SCNC: 105 MMOL/L (ref 100–108)
CO2 SERPL-SCNC: 26 MMOL/L (ref 21–32)
CREAT SERPL-MCNC: 0.83 MG/DL (ref 0.6–1.3)
EOSINOPHIL # BLD AUTO: 0.03 THOUSAND/ΜL (ref 0–0.61)
EOSINOPHIL NFR BLD AUTO: 0 % (ref 0–6)
ERYTHROCYTE [DISTWIDTH] IN BLOOD BY AUTOMATED COUNT: 12.8 % (ref 11.6–15.1)
GFR SERPL CREATININE-BSD FRML MDRD: 77 ML/MIN/1.73SQ M
GLUCOSE SERPL-MCNC: 97 MG/DL (ref 65–140)
HCT VFR BLD AUTO: 41 % (ref 34.8–46.1)
HGB BLD-MCNC: 13.4 G/DL (ref 11.5–15.4)
IMM GRANULOCYTES # BLD AUTO: 0.02 THOUSAND/UL (ref 0–0.2)
IMM GRANULOCYTES NFR BLD AUTO: 0 % (ref 0–2)
LIPASE SERPL-CCNC: 128 U/L (ref 73–393)
LYMPHOCYTES # BLD AUTO: 2.79 THOUSANDS/ΜL (ref 0.6–4.47)
LYMPHOCYTES NFR BLD AUTO: 40 % (ref 14–44)
MCH RBC QN AUTO: 30 PG (ref 26.8–34.3)
MCHC RBC AUTO-ENTMCNC: 32.7 G/DL (ref 31.4–37.4)
MCV RBC AUTO: 92 FL (ref 82–98)
MONOCYTES # BLD AUTO: 0.64 THOUSAND/ΜL (ref 0.17–1.22)
MONOCYTES NFR BLD AUTO: 9 % (ref 4–12)
NEUTROPHILS # BLD AUTO: 3.53 THOUSANDS/ΜL (ref 1.85–7.62)
NEUTS SEG NFR BLD AUTO: 50 % (ref 43–75)
NRBC BLD AUTO-RTO: 0 /100 WBCS
PLATELET # BLD AUTO: 324 THOUSANDS/UL (ref 149–390)
PMV BLD AUTO: 10 FL (ref 8.9–12.7)
POTASSIUM SERPL-SCNC: 3.8 MMOL/L (ref 3.5–5.3)
PROT SERPL-MCNC: 7.6 G/DL (ref 6.4–8.2)
RBC # BLD AUTO: 4.47 MILLION/UL (ref 3.81–5.12)
SODIUM SERPL-SCNC: 142 MMOL/L (ref 136–145)
TROPONIN I SERPL-MCNC: <0.02 NG/ML
WBC # BLD AUTO: 7.05 THOUSAND/UL (ref 4.31–10.16)

## 2021-10-22 PROCEDURE — 99284 EMERGENCY DEPT VISIT MOD MDM: CPT

## 2021-10-22 PROCEDURE — 74177 CT ABD & PELVIS W/CONTRAST: CPT

## 2021-10-22 PROCEDURE — 84484 ASSAY OF TROPONIN QUANT: CPT | Performed by: EMERGENCY MEDICINE

## 2021-10-22 PROCEDURE — 96374 THER/PROPH/DIAG INJ IV PUSH: CPT

## 2021-10-22 PROCEDURE — 36415 COLL VENOUS BLD VENIPUNCTURE: CPT | Performed by: EMERGENCY MEDICINE

## 2021-10-22 PROCEDURE — 85025 COMPLETE CBC W/AUTO DIFF WBC: CPT | Performed by: EMERGENCY MEDICINE

## 2021-10-22 PROCEDURE — G1004 CDSM NDSC: HCPCS

## 2021-10-22 PROCEDURE — 83690 ASSAY OF LIPASE: CPT | Performed by: EMERGENCY MEDICINE

## 2021-10-22 PROCEDURE — 93005 ELECTROCARDIOGRAM TRACING: CPT

## 2021-10-22 PROCEDURE — 96361 HYDRATE IV INFUSION ADD-ON: CPT

## 2021-10-22 PROCEDURE — 99284 EMERGENCY DEPT VISIT MOD MDM: CPT | Performed by: EMERGENCY MEDICINE

## 2021-10-22 PROCEDURE — 80053 COMPREHEN METABOLIC PANEL: CPT | Performed by: EMERGENCY MEDICINE

## 2021-10-22 RX ORDER — ONDANSETRON 4 MG/1
4 TABLET, FILM COATED ORAL EVERY 6 HOURS
Qty: 20 TABLET | Refills: 0 | Status: SHIPPED | OUTPATIENT
Start: 2021-10-22

## 2021-10-22 RX ORDER — ONDANSETRON 2 MG/ML
4 INJECTION INTRAMUSCULAR; INTRAVENOUS ONCE
Status: COMPLETED | OUTPATIENT
Start: 2021-10-22 | End: 2021-10-22

## 2021-10-22 RX ADMIN — IOHEXOL 100 ML: 350 INJECTION, SOLUTION INTRAVENOUS at 18:48

## 2021-10-22 RX ADMIN — SODIUM CHLORIDE 1000 ML: 0.9 INJECTION, SOLUTION INTRAVENOUS at 18:06

## 2021-10-22 RX ADMIN — ONDANSETRON 4 MG: 2 INJECTION INTRAMUSCULAR; INTRAVENOUS at 18:05

## 2021-10-24 LAB
ATRIAL RATE: 72 BPM
P AXIS: 32 DEGREES
PR INTERVAL: 166 MS
QRS AXIS: 85 DEGREES
QRSD INTERVAL: 102 MS
QT INTERVAL: 432 MS
QTC INTERVAL: 473 MS
T WAVE AXIS: 21 DEGREES
VENTRICULAR RATE: 72 BPM

## 2021-10-24 PROCEDURE — 93010 ELECTROCARDIOGRAM REPORT: CPT | Performed by: INTERNAL MEDICINE

## 2021-11-11 ENCOUNTER — OFFICE VISIT (OUTPATIENT)
Dept: GASTROENTEROLOGY | Facility: CLINIC | Age: 61
End: 2021-11-11
Payer: COMMERCIAL

## 2021-11-11 VITALS
DIASTOLIC BLOOD PRESSURE: 90 MMHG | HEART RATE: 88 BPM | WEIGHT: 213.4 LBS | BODY MASS INDEX: 36.43 KG/M2 | SYSTOLIC BLOOD PRESSURE: 128 MMHG | HEIGHT: 64 IN

## 2021-11-11 DIAGNOSIS — R10.84 GENERALIZED ABDOMINAL PAIN: ICD-10-CM

## 2021-11-11 DIAGNOSIS — R11.0 NAUSEA: Primary | ICD-10-CM

## 2021-11-11 PROBLEM — Z12.11 COLON CANCER SCREENING: Status: ACTIVE | Noted: 2021-11-11

## 2021-11-11 PROCEDURE — 99203 OFFICE O/P NEW LOW 30 MIN: CPT | Performed by: INTERNAL MEDICINE

## 2021-11-11 PROCEDURE — 1036F TOBACCO NON-USER: CPT | Performed by: INTERNAL MEDICINE

## 2021-11-11 PROCEDURE — 3008F BODY MASS INDEX DOCD: CPT | Performed by: INTERNAL MEDICINE

## 2021-11-11 RX ORDER — METOCLOPRAMIDE 5 MG/1
5 TABLET ORAL 4 TIMES DAILY
Qty: 60 TABLET | Refills: 1 | Status: SHIPPED | OUTPATIENT
Start: 2021-11-11 | End: 2021-11-12 | Stop reason: SDUPTHER

## 2021-11-11 RX ORDER — METOCLOPRAMIDE 5 MG/1
5 TABLET ORAL 4 TIMES DAILY
Qty: 120 TABLET | Refills: 5 | Status: SHIPPED | OUTPATIENT
Start: 2021-11-11 | End: 2021-11-11

## 2021-11-11 RX ORDER — PANTOPRAZOLE SODIUM 40 MG/1
40 TABLET, DELAYED RELEASE ORAL DAILY
Qty: 30 TABLET | Refills: 2
Start: 2021-11-11 | End: 2021-12-23 | Stop reason: SDUPTHER

## 2021-11-12 DIAGNOSIS — R11.0 NAUSEA: ICD-10-CM

## 2021-11-12 RX ORDER — METOCLOPRAMIDE 5 MG/1
5 TABLET ORAL 4 TIMES DAILY
Qty: 480 TABLET | Refills: 1 | Status: SHIPPED | OUTPATIENT
Start: 2021-11-12 | End: 2021-12-11 | Stop reason: SDUPTHER

## 2021-12-10 ENCOUNTER — HOSPITAL ENCOUNTER (OUTPATIENT)
Dept: GASTROENTEROLOGY | Facility: AMBULATORY SURGERY CENTER | Age: 61
Discharge: HOME | End: 2021-12-10
Payer: COMMERCIAL

## 2021-12-10 VITALS
HEART RATE: 73 BPM | RESPIRATION RATE: 18 BRPM | SYSTOLIC BLOOD PRESSURE: 110 MMHG | TEMPERATURE: 96.6 F | OXYGEN SATURATION: 95 % | DIASTOLIC BLOOD PRESSURE: 60 MMHG

## 2021-12-10 DIAGNOSIS — R11.0 NAUSEA: ICD-10-CM

## 2021-12-10 PROCEDURE — 43239 EGD BIOPSY SINGLE/MULTIPLE: CPT | Performed by: INTERNAL MEDICINE

## 2021-12-10 RX ORDER — METOCLOPRAMIDE 10 MG/1
10 TABLET ORAL 2 TIMES DAILY
Qty: 120 TABLET | Refills: 5 | Status: SHIPPED | OUTPATIENT
Start: 2021-12-10

## 2021-12-11 DIAGNOSIS — R11.0 NAUSEA: ICD-10-CM

## 2021-12-13 RX ORDER — METOCLOPRAMIDE 5 MG/1
5 TABLET ORAL 4 TIMES DAILY
Qty: 120 TABLET | Refills: 1 | Status: SHIPPED | OUTPATIENT
Start: 2021-12-13

## 2021-12-17 ENCOUNTER — TELEPHONE (OUTPATIENT)
Dept: GASTROENTEROLOGY | Facility: CLINIC | Age: 61
End: 2021-12-17

## 2021-12-23 DIAGNOSIS — R10.84 GENERALIZED ABDOMINAL PAIN: ICD-10-CM

## 2021-12-23 RX ORDER — PANTOPRAZOLE SODIUM 40 MG/1
40 TABLET, DELAYED RELEASE ORAL DAILY
Qty: 90 TABLET | Refills: 0
Start: 2021-12-23 | End: 2021-12-28 | Stop reason: SDUPTHER

## 2021-12-28 DIAGNOSIS — R10.84 GENERALIZED ABDOMINAL PAIN: ICD-10-CM

## 2021-12-28 RX ORDER — PANTOPRAZOLE SODIUM 40 MG/1
40 TABLET, DELAYED RELEASE ORAL DAILY
Qty: 30 TABLET | Refills: 0 | Status: SHIPPED | OUTPATIENT
Start: 2021-12-28 | End: 2022-07-29 | Stop reason: SDUPTHER

## 2021-12-28 RX ORDER — PANTOPRAZOLE SODIUM 40 MG/1
40 TABLET, DELAYED RELEASE ORAL DAILY
Qty: 90 TABLET | Refills: 1 | Status: SHIPPED | OUTPATIENT
Start: 2021-12-28

## 2022-02-15 DIAGNOSIS — E03.9 HYPOTHYROIDISM, UNSPECIFIED TYPE: ICD-10-CM

## 2022-02-15 RX ORDER — LEVOTHYROXINE SODIUM 0.1 MG/1
100 TABLET ORAL DAILY
Qty: 90 TABLET | Refills: 0 | Status: SHIPPED | OUTPATIENT
Start: 2022-02-15 | End: 2022-06-09 | Stop reason: SDUPTHER

## 2022-04-07 LAB
LEFT EYE DIABETIC RETINOPATHY: NORMAL
RIGHT EYE DIABETIC RETINOPATHY: NORMAL

## 2022-06-09 DIAGNOSIS — E03.9 HYPOTHYROIDISM, UNSPECIFIED TYPE: ICD-10-CM

## 2022-06-09 RX ORDER — LEVOTHYROXINE SODIUM 0.1 MG/1
100 TABLET ORAL DAILY
Qty: 90 TABLET | Refills: 0 | Status: SHIPPED | OUTPATIENT
Start: 2022-06-09

## 2022-07-29 DIAGNOSIS — R10.84 GENERALIZED ABDOMINAL PAIN: ICD-10-CM

## 2022-07-29 RX ORDER — PANTOPRAZOLE SODIUM 40 MG/1
40 TABLET, DELAYED RELEASE ORAL DAILY
Qty: 90 TABLET | Refills: 0 | Status: SHIPPED | OUTPATIENT
Start: 2022-07-29

## 2022-09-07 DIAGNOSIS — E03.9 HYPOTHYROIDISM, UNSPECIFIED TYPE: ICD-10-CM

## 2022-09-09 DIAGNOSIS — E78.00 HYPERCHOLESTEREMIA: ICD-10-CM

## 2022-09-09 DIAGNOSIS — R73.03 PRE-DIABETES: ICD-10-CM

## 2022-09-09 DIAGNOSIS — E03.9 ACQUIRED HYPOTHYROIDISM: Primary | ICD-10-CM

## 2022-09-09 RX ORDER — LEVOTHYROXINE SODIUM 0.1 MG/1
100 TABLET ORAL DAILY
Qty: 30 TABLET | Refills: 0 | Status: SHIPPED | OUTPATIENT
Start: 2022-09-09 | End: 2022-09-14 | Stop reason: SDUPTHER

## 2022-09-09 NOTE — TELEPHONE ENCOUNTER
patient aware that a 30 day supplies was sent-aware that she needs to complete labs and OV prior to 30 days being up-declined scheduling today 09/09/2022-states she will call back

## 2022-09-09 NOTE — TELEPHONE ENCOUNTER
Patient is due physical and labs-needs TSH  Will send 30 day supply  Have her schedule appt   Fasting Lab order placed for [facility]

## 2022-09-14 DIAGNOSIS — E03.9 HYPOTHYROIDISM, UNSPECIFIED TYPE: ICD-10-CM

## 2022-09-14 RX ORDER — LEVOTHYROXINE SODIUM 0.1 MG/1
100 TABLET ORAL DAILY
Qty: 30 TABLET | Refills: 0 | Status: SHIPPED | OUTPATIENT
Start: 2022-09-14 | End: 2022-10-19 | Stop reason: SDUPTHER

## 2022-09-14 NOTE — TELEPHONE ENCOUNTER
Med refill request left on rx line  States that her pills were smashed in box from mail order [facility]      Requesting new rx to be sent to [facility] [phone]

## 2022-10-12 PROBLEM — Z12.11 COLON CANCER SCREENING: Status: RESOLVED | Noted: 2021-11-11 | Resolved: 2022-10-12

## 2022-10-13 LAB
ALBUMIN SERPL-MCNC: 4.6 G/DL (ref 3.8–4.8)
ALBUMIN/GLOB SERPL: 1.8 {RATIO} (ref 1.2–2.2)
ALP SERPL-CCNC: 97 IU/L (ref 44–121)
ALT SERPL-CCNC: 41 IU/L (ref 0–32)
AST SERPL-CCNC: 45 IU/L (ref 0–40)
BILIRUB SERPL-MCNC: 0.4 MG/DL (ref 0–1.2)
BUN SERPL-MCNC: 14 MG/DL (ref 8–27)
BUN/CREAT SERPL: 19 (ref 12–28)
CALCIUM SERPL-MCNC: 9.7 MG/DL (ref 8.7–10.3)
CHLORIDE SERPL-SCNC: 104 MMOL/L (ref 96–106)
CHOLEST SERPL-MCNC: 260 MG/DL (ref 100–199)
CHOLEST/HDLC SERPL: 6 RATIO (ref 0–4.4)
CO2 SERPL-SCNC: 22 MMOL/L (ref 20–29)
CREAT SERPL-MCNC: 0.74 MG/DL (ref 0.57–1)
EGFR: 92 ML/MIN/1.73
EST. AVERAGE GLUCOSE BLD GHB EST-MCNC: 143 MG/DL
GLOBULIN SER-MCNC: 2.6 G/DL (ref 1.5–4.5)
GLUCOSE SERPL-MCNC: 134 MG/DL (ref 70–99)
HBA1C MFR BLD: 6.6 % (ref 4.8–5.6)
HDLC SERPL-MCNC: 43 MG/DL
LDLC SERPL CALC-MCNC: 168 MG/DL (ref 0–99)
LDLC SERPL DIRECT ASSAY-MCNC: 175 MG/DL (ref 0–99)
POTASSIUM SERPL-SCNC: 4.7 MMOL/L (ref 3.5–5.2)
PROT SERPL-MCNC: 7.2 G/DL (ref 6–8.5)
SL AMB VLDL CHOLESTEROL CALC: 49 MG/DL (ref 5–40)
SODIUM SERPL-SCNC: 140 MMOL/L (ref 134–144)
TRIGL SERPL-MCNC: 261 MG/DL (ref 0–149)
TSH SERPL DL<=0.005 MIU/L-ACNC: 3.54 UIU/ML (ref 0.45–4.5)

## 2022-10-17 ENCOUNTER — TELEPHONE (OUTPATIENT)
Dept: FAMILY MEDICINE CLINIC | Facility: CLINIC | Age: 62
End: 2022-10-17

## 2022-10-17 NOTE — TELEPHONE ENCOUNTER
----- Message from [address] [place] [address],[address] sent at 10/14/2022  1:17 PM EDT -----  Cholesterol is high  HA1C is 6 6 which is diabetic range  Recommend lifestyle modifications diet/exercise and starting statin therapy  Patient is due for physical  Schedule appointment for physical and to discuss labs/medications

## 2022-10-17 NOTE — TELEPHONE ENCOUNTER
Pt is aware of her lab results and advice  Pt is scheduled for MAW on 11/19/2022 and can discuss the tx recommendations OV

## 2022-10-19 ENCOUNTER — OFFICE VISIT (OUTPATIENT)
Dept: FAMILY MEDICINE CLINIC | Facility: CLINIC | Age: 62
End: 2022-10-19
Payer: COMMERCIAL

## 2022-10-19 VITALS
HEART RATE: 98 BPM | DIASTOLIC BLOOD PRESSURE: 80 MMHG | WEIGHT: 223.8 LBS | HEIGHT: 64 IN | SYSTOLIC BLOOD PRESSURE: 128 MMHG | BODY MASS INDEX: 38.21 KG/M2 | OXYGEN SATURATION: 97 %

## 2022-10-19 DIAGNOSIS — Z00.00 ANNUAL PHYSICAL EXAM: Primary | ICD-10-CM

## 2022-10-19 DIAGNOSIS — E03.9 HYPOTHYROIDISM, UNSPECIFIED TYPE: ICD-10-CM

## 2022-10-19 DIAGNOSIS — Z12.31 BREAST CANCER SCREENING BY MAMMOGRAM: ICD-10-CM

## 2022-10-19 DIAGNOSIS — E78.2 MIXED HYPERLIPIDEMIA: ICD-10-CM

## 2022-10-19 DIAGNOSIS — R79.89 ELEVATED LFTS: ICD-10-CM

## 2022-10-19 DIAGNOSIS — E11.9 NEW ONSET TYPE 2 DIABETES MELLITUS (HCC): ICD-10-CM

## 2022-10-19 PROCEDURE — 99396 PREV VISIT EST AGE 40-64: CPT | Performed by: NURSE PRACTITIONER

## 2022-10-19 PROCEDURE — 99214 OFFICE O/P EST MOD 30 MIN: CPT | Performed by: NURSE PRACTITIONER

## 2022-10-19 RX ORDER — EZETIMIBE 10 MG/1
10 TABLET ORAL DAILY
Qty: 90 TABLET | Refills: 1 | Status: SHIPPED | OUTPATIENT
Start: 2022-10-19

## 2022-10-19 RX ORDER — LEVOTHYROXINE SODIUM 0.1 MG/1
100 TABLET ORAL DAILY
Qty: 30 TABLET | Refills: 0 | Status: SHIPPED | OUTPATIENT
Start: 2022-10-19

## 2022-10-19 NOTE — PATIENT INSTRUCTIONS
Reviewed labs  Your recent HA1C 6 6 is in diabetic range  Healthy diet-decrease sugars and carbs-Repeat Ha1c in 3 months  Your cholesterol and triglycerides are high  Triglycerides have increased since previous lab  Start Zetia as ordered for cholesterol  Life style modifications-diet/exercise as discussed  Repeat lipids in 6 months  Wellness Visit for Adults   AMBULATORY CARE:   A wellness visit  is when you see your healthcare provider to get screened for health problems  Your healthcare provider will also give you advice on how to stay healthy  Write down your questions so you remember to ask them  Ask your healthcare provider how often you should have a wellness visit  What happens at a wellness visit:  Your healthcare provider will ask about your health, and your family history of health problems  This includes high blood pressure, heart disease, and cancer  He or she will ask if you have symptoms that concern you, if you smoke, and about your mood  You may also be asked about your intake of medicines, supplements, food, and alcohol  Any of the following may be done: Your weight  will be checked  Your height may also be checked so your body mass index (BMI) can be calculated  Your BMI shows if you are at a healthy weight  Your blood pressure  and heart rate will be checked  Your temperature may also be checked  Blood and urine tests  may be done  Blood tests may be done to check your cholesterol levels  Abnormal cholesterol levels increase your risk for heart disease and stroke  You may also need a blood or urine test to check for diabetes if you are at increased risk  Urine tests may be done to look for signs of an infection or kidney disease  A physical exam  includes checking your heartbeat and lungs with a stethoscope  Your healthcare provider may also check your skin to look for sun damage  Screening tests  may be recommended   A screening test is done to check for diseases that may not cause symptoms  The screening tests you may need depend on your age, gender, family history, and lifestyle habits  For example, colorectal screening may be recommended if you are 48years old or older  Screening tests you need if you are a woman:   A Pap smear  is used to screen for cervical cancer  Pap smears are usually done every 3 to 5 years depending on your age  You may need them more often if you have had abnormal Pap smear test results in the past  Ask your healthcare provider how often you should have a Pap smear  A mammogram  is an x-ray of your breasts to screen for breast cancer  Experts recommend mammograms every 2 years starting at age 48 years  You may need a mammogram at age 52 years or younger if you have an increased risk for breast cancer  Talk to your healthcare provider about when you should start having mammograms and how often you need them  Vaccines you may need:   Get an influenza vaccine  every year  The influenza vaccine protects you from the flu  Several types of viruses cause the flu  The viruses change over time, so new vaccines are made each year  Get a tetanus-diphtheria (Td) booster vaccine  every 10 years  This vaccine protects you against tetanus and diphtheria  Tetanus is a severe infection that may cause painful muscle spasms and lockjaw  Diphtheria is a severe bacterial infection that causes a thick covering in the back of your mouth and throat  Get a human papillomavirus (HPV) vaccine  if you are female and aged 23 to 32 or male 23 to 24 and never received it  This vaccine protects you from HPV infection  HPV is the most common infection spread by sexual contact  HPV may also cause vaginal, penile, and anal cancers  Get a pneumococcal vaccine  if you are aged 72 years or older  The pneumococcal vaccine is an injection given to protect you from pneumococcal disease  Pneumococcal disease is an infection caused by pneumococcal bacteria   The infection may cause pneumonia, meningitis, or an ear infection  Get a shingles vaccine  if you are 60 or older, even if you have had shingles before  The shingles vaccine is an injection to protect you from the varicella-zoster virus  This is the same virus that causes chickenpox  Shingles is a painful rash that develops in people who had chickenpox or have been exposed to the virus  How to eat healthy:  My Plate is a model for planning healthy meals  It shows the types and amounts of foods that should go on your plate  Fruits and vegetables make up about half of your plate, and grains and protein make up the other half  A serving of dairy is included on the side of your plate  The amount of calories and serving sizes you need depends on your age, gender, weight, and height  Examples of healthy foods are listed below:  Eat a variety of vegetables  such as dark green, red, and orange vegetables  You can also include canned vegetables low in sodium (salt) and frozen vegetables without added butter or sauces  Eat a variety of fresh fruits , canned fruit in 100% juice, frozen fruit, and dried fruit  Include whole grains  At least half of the grains you eat should be whole grains  Examples include whole-wheat bread, wheat pasta, brown rice, and whole-grain cereals such as oatmeal     Eat a variety of protein foods such as seafood (fish and shellfish), lean meat, and poultry without skin (turkey and chicken)  Examples of lean meats include pork leg, shoulder, or tenderloin, and beef round, sirloin, tenderloin, and extra lean ground beef  Other protein foods include eggs and egg substitutes, beans, peas, soy products, nuts, and seeds  Choose low-fat dairy products such as skim or 1% milk or low-fat yogurt, cheese, and cottage cheese  Limit unhealthy fats  such as butter, hard margarine, and shortening  Exercise:  Exercise at least 30 minutes per day on most days of the week   Some examples of exercise include walking, biking, dancing, and swimming  You can also fit in more physical activity by taking the stairs instead of the elevator or parking farther away from stores  Include muscle strengthening activities 2 days each week  Regular exercise provides many health benefits  It helps you manage your weight, and decreases your risk for type 2 diabetes, heart disease, stroke, and high blood pressure  Exercise can also help improve your mood  Ask your healthcare provider about the best exercise plan for you  General health and safety guidelines:   Do not smoke  Nicotine and other chemicals in cigarettes and cigars can cause lung damage  Ask your healthcare provider for information if you currently smoke and need help to quit  E-cigarettes or smokeless tobacco still contain nicotine  Talk to your healthcare provider before you use these products  Limit alcohol  A drink of alcohol is 12 ounces of beer, 5 ounces of wine, or 1½ ounces of liquor  Lose weight, if needed  Being overweight increases your risk of certain health conditions  These include heart disease, high blood pressure, type 2 diabetes, and certain types of cancer  Protect your skin  Do not sunbathe or use tanning beds  Use sunscreen with a SPF 15 or higher  Apply sunscreen at least 15 minutes before you go outside  Reapply sunscreen every 2 hours  Wear protective clothing, hats, and sunglasses when you are outside  Drive safely  Always wear your seatbelt  Make sure everyone in your car wears a seatbelt  A seatbelt can save your life if you are in an accident  Do not use your cell phone when you are driving  This could distract you and cause an accident  Pull over if you need to make a call or send a text message  Practice safe sex  Use latex condoms if are sexually active and have more than one partner  Your healthcare provider may recommend screening tests for sexually transmitted infections (STIs)      Wear helmets, lifejackets, and protective gear  Always wear a helmet when you ride a bike or motorcycle, go skiing, or play sports that could cause a head injury  Wear protective equipment when you play sports  Wear a lifejacket when you are on a boat or doing water sports  © Copyright [employer] 2022 Information is for End User's use only and may not be sold, redistributed or otherwise used for commercial purposes  All illustrations and images included in CareNotes® are the copyrighted property of [employer]  or Josue Lanza   The above information is an  only  It is not intended as medical advice for individual conditions or treatments  Talk to your doctor, nurse or pharmacist before following any medical regimen to see if it is safe and effective for you  Type 2 Diabetes Management for Adults   AMBULATORY CARE:   Type 2 diabetes  is a disease that affects how your body uses glucose (sugar)  Either your body cannot make enough insulin, or it cannot use the insulin correctly  It is important to keep diabetes controlled to prevent damage to your heart, blood vessels, and other organs  Have someone call your local emergency number (911 in the [address] [facility]) if:   You cannot be woken      You have signs of diabetic ketoacidosis:     confusion, fatigue    vomiting    rapid heartbeat    fruity smelling breath    extreme thirst    dry mouth and skin    You have any of the following signs of a heart attack:      Squeezing, pressure, or pain in your chest    You may  also have any of the following:     Discomfort or pain in your back, neck, jaw, stomach, or arm    Shortness of breath    Nausea or vomiting    Lightheadedness or a sudden cold sweat    You have any of the following signs of a stroke:      Numbness or drooping on one side of your face     Weakness in an arm or leg    Confusion or difficulty speaking    Dizziness, a severe headache, or vision loss    Call your doctor or diabetes care team if:   You have a sore or wound that will not heal     You have a change in the amount you urinate  Your blood sugar levels are higher than your target goals  You often have lower blood sugar levels than your target goals  Your skin is red, dry, warm, or swollen  You have trouble coping with diabetes, or you feel anxious or depressed  You have questions or concerns about your condition or care  What you need to know about high blood sugar levels:  High blood sugar levels may not cause any symptoms  You may feel more thirsty or urinate more often than usual  Over time, high blood sugar levels can damage your nerves, blood vessels, tissues, and organs  The following can increase your blood sugar levels:  Large meals or large amounts of carbohydrates at one time    Less physical activity    Stress    Illness    A lower dose of medicine or insulin, or a late dose    What you need to know about low blood sugar levels: You can prevent symptoms such as shakiness, dizziness, irritability, or confusion by preventing your blood sugar levels from going too low  Treat a low blood sugar level right away  Drink 4 ounces of juice or have 1 tube of glucose gel  Check your blood sugar level again 10 to 15 minutes later  When the level goes back to normal, eat a meal or snack to prevent another decrease  Keep glucose gel, raisins, or hard candy with you at all times to treat a low blood sugar level  Your blood sugar level can get too low if you take diabetes medicine or insulin and do not eat enough food  If you use insulin, check your blood sugar level before you exercise  If your blood sugar level is below 100 mg/dL, eat 4 crackers or 2 ounces of raisins, or drink 4 ounces of juice  Check your level every 30 minutes if you exercise more than 1 hour  You may need a snack during or after exercise      What you can do to manage your blood sugar levels:   Check your blood sugar levels as directed and as needed  Several items are available to use to check your levels  You may need to check by testing a drop of blood in a glucose monitor  You may instead be given a continuous glucose monitoring (CGM) device  The device is worn at all times  The CGM checks your blood sugar level every 5 minutes  It sends results to an electronic device such as a smart phone  A CGM can be used with or without an insulin pump  Talk with your provider to find out which method is best for you  The goal for blood sugar levels before meals  is between 80 and 130 mg/dL and 2 hours after eating  is lower than 180 mg/dL  Make healthy food choices  Work with a dietitian to develop a meal plan that works for you and your schedule  A dietitian can help you learn how to eat the right amount of carbohydrates during your meals and snacks  Carbohydrates can raise your blood sugar level if you eat too many at one time  Examples of foods that contain carbohydrates are breads, cereals, rice, pasta, and sweets  Get regular physical activity  Physical activity can help you get to your target blood sugar level goal and manage your weight  Get at least 150 minutes of moderate to vigorous aerobic physical activity each week  Do not miss more than 2 days in a row  Do not sit longer than 30 minutes at a time  Your healthcare provider can help you create an activity plan  The plan can include the best activities for you and can help you build your strength and endurance  Maintain a healthy weight  Ask your healthcare provider what a healthy weight is for you  Ask him or her to help you create a safe weight loss plan if you are overweight  Take your diabetes medicine or insulin as directed  You may need diabetes medicine, insulin, or both to help control your blood sugar levels  Your healthcare provider will teach you how and when to take your diabetes medicine or insulin   You will also be taught about side effects oral diabetes medicine can cause  Insulin may be injected, or given through a pump or pen  You and your care team will discuss which method is best for you  An insulin pump  is an implanted device that gives your insulin 24 hours a day  An insulin pump prevents the need for multiple insulin injections in a day  An insulin pen  is a device prefilled with the right amount of insulin  You and your family members will be taught how to draw up and give insulin  if this is the best method for you  Your education team will also teach you how to dispose of needles and syringes  You will learn how much insulin you need  and when to give it  You will be taught when not to give insulin  You will also be taught what to do if your blood sugar level drops too low  This may happen if you take insulin and do not eat the right amount of carbohydrates  Other things you can do to manage type 2 diabetes:   Wear medical alert identification  Wear medical alert jewelry or carry a card that says you have diabetes  Ask your provider where to get these items  Do not smoke  Nicotine and other chemicals in cigarettes and cigars can cause lung and blood vessel damage  It also makes it more difficult to manage your diabetes  Ask your provider for information if you currently smoke and need help to quit  Do not use e-cigarettes or smokeless tobacco in place of cigarettes or to help you quit  They still contain nicotine  Check your feet each day for cuts, scratches, calluses, or other wounds  Look for redness and swelling, and feel for warmth  Wear shoes that fit well  Check your shoes for rocks or other objects that can hurt your feet  Do not walk barefoot or wear shoes without socks  Wear cotton socks to help keep your feet dry  Ask about vaccines you may need  You have a higher risk for serious illness if you get the flu, pneumonia, COVID-19, or hepatitis   Ask your provider if you should get vaccines to prevent these or other diseases, and when to get the vaccines  Talk to your care team if you become stressed about diabetes care  Sometimes being able to fit diabetes care into your life can cause increased stress  The stress can cause you not to take care of yourself properly  Your care team can help by offering tips about self-care  Your care team may suggest you talk to a mental health provider  The provider can listen and offer help with self-care issues  Follow up with your doctor or diabetes care team as directed: You may need to have blood tests done before your follow-up visit  The test results will show if changes need to be made in your treatment or self-care  Write down your questions so you remember to ask them during your visits  Talk to your provider if you cannot afford your medicine  © Copyright [employer] 2022 Information is for End User's use only and may not be sold, redistributed or otherwise used for commercial purposes  All illustrations and images included in CareNotes® are the copyrighted property of [employer] Inc  or [facility] Lanza   The above information is an  only  It is not intended as medical advice for individual conditions or treatments  Talk to your doctor, nurse or pharmacist before following any medical regimen to see if it is safe and effective for you  Foot Care for People with Diabetes   AMBULATORY CARE:   What you need to know about foot care: Foot care helps protect your feet and prevent foot ulcers or sores  Long-term high blood sugar levels can damage the blood vessels and nerves in your legs and feet  This damage makes it hard to feel pressure, pain, temperature, and touch  You may not be able to feel a cut or sore, or shoes that are too tight  Foot care is needed to prevent serious problems, such as an infection or amputation  Diabetes may cause your toes to become crooked or curved under   These changes may affect the way you walk and can lead to increased pressure on your foot  The pressure can decrease blood flow to your feet  Lack of blood flow increases your risk for a foot ulcer  Do not ignore small problems, such as dry skin or small wounds  These can become life-threatening over time without proper care  Call your care team provider if:   Your feet become numb, weak, or hard to move  You have pus draining from a sore on your foot  You have a wound on your foot that gets bigger, deeper, or does not heal      You see blisters, cuts, scratches, calluses, or sores on your foot  You have a fever, and your feet become red, warm, and swollen  Your toenails become thick, curled, or yellow  You find it hard to check your feet because your vision is poor  You have questions or concerns about your condition or care  How to care for your feet:   Check your feet each day  Look at your whole foot, including the bottom, and between and under your toes  Check for wounds, corns, and calluses  Use a mirror to see the bottom of your feet  The skin on your feet may be shiny, tight, or darker than normal  Your feet may also be cold and pale  Feel your feet by running your hands along the tops, bottoms, sides, and between your toes  Redness, swelling, and warmth are signs of blood flow problems that can lead to a foot ulcer  Do not try to remove corns or calluses yourself  Wash your feet each day with soap and warm water  Do not use hot water, because this can injure your foot  Dry your feet gently with a towel after you wash them  Dry between and under your toes  Apply lotion or a moisturizer on your dry feet  Ask your care team provider what lotions are best to use  Do not put lotion or moisturizer between your toes  Moisture between your toes could lead to skin breakdown  Cut your toenails correctly  File or cut your toenails straight across  Use a soft brush to clean around your toenails   If your toenails are very thick, you may need to have a care team provider or specialist cut them  Protect your feet  Do not walk barefoot or wear your shoes without socks  Check your shoes for rocks or other objects that can hurt your feet  Wear cotton socks to help keep your feet dry  Wear socks without toe seams, or wear them with the seams inside out  Change your socks each day  Do not wear socks that are dirty or damp  Wear shoes that fit well  Wear shoes that do not rub against any area of your feet  Your shoes should be ½ to ¾ inch (1 to 2 centimeters) longer than your feet  Your shoes should also have extra space around the widest part of your feet  Walking or athletic shoes with laces or straps that adjust are best  Ask your care team provider for help to choose shoes that fit you best  Ask him or her if you need to wear an insert, orthotic, or bandage on your feet  Go to your follow-up visits  Your care team provider will do a foot exam at least once a year  You may need a foot exam more often if you have nerve damage, foot deformities, or ulcers  He will check for nerve damage and how well you can feel your feet  He will check your shoes to see if they fit well  Do not smoke  Smoking can damage your blood vessels and put you at increased risk for foot ulcers  Ask your care team provider for information if you currently smoke and need help to quit  E-cigarettes or smokeless tobacco still contain nicotine  Talk to your care team provider before you use these products  Follow up with your diabetes care team provider or foot specialist as directed: You will need to have your feet checked at least once a year  You may need a foot exam more often if you have nerve damage, foot deformities, or ulcers  Write down your questions so you remember to ask them during your visits    © Copyright [employer] 2022 Information is for End User's use only and may not be sold, redistributed or otherwise used for commercial purposes  All illustrations and images included in CareNotes® are the copyrighted property of [employer]  or Josue Mata  The above information is an  only  It is not intended as medical advice for individual conditions or treatments  Talk to your doctor, nurse or pharmacist before following any medical regimen to see if it is safe and effective for you

## 2022-10-19 NOTE — PROGRESS NOTES
[facility]        NAME: Edgar Villegas is a 65 y o  female  : 1960    MRN: [id number]  DATE: 2022  TIME: 3:44 PM    Assessment and Plan   Annual physical exam [S37 79]  1  Annual physical exam     2  Breast cancer screening by mammogram  Mammo screening bilateral w 3d & cad   3  New onset type 2 diabetes mellitus (HCC)  HEMOGLOBIN A1C W/ EAG ESTIMATION    HEMOGLOBIN A1C W/ EAG ESTIMATION   4  Mixed hyperlipidemia  Lipid panel    Lipid panel    ezetimibe (ZETIA) 10 mg tablet   5  Elevated LFTs  Comprehensive metabolic panel    Comprehensive metabolic panel   6  Hypothyroidism, unspecified type  levothyroxine 100 mcg tablet         Patient Instructions     Patient Instructions     Reviewed labs  Your recent HA1C 6 6 is in diabetic range  Healthy diet-decrease sugars and carbs-Repeat Ha1c in 3 months  Your cholesterol and triglycerides are high  Triglycerides have increased since previous lab  Start Zetia as ordered for cholesterol  Life style modifications-diet/exercise as discussed  Repeat lipids in 6 months  Wellness Visit for Adults   AMBULATORY CARE:   A wellness visit  is when you see your healthcare provider to get screened for health problems  Your healthcare provider will also give you advice on how to stay healthy  Write down your questions so you remember to ask them  Ask your healthcare provider how often you should have a wellness visit  What happens at a wellness visit:  Your healthcare provider will ask about your health, and your family history of health problems  This includes high blood pressure, heart disease, and cancer  He or she will ask if you have symptoms that concern you, if you smoke, and about your mood  You may also be asked about your intake of medicines, supplements, food, and alcohol  Any of the following may be done:  · Your weight  will be checked  Your height may also be checked so your body mass index (BMI) can be calculated   Your BMI shows if you are at a healthy weight  · Your blood pressure  and heart rate will be checked  Your temperature may also be checked  · Blood and urine tests  may be done  Blood tests may be done to check your cholesterol levels  Abnormal cholesterol levels increase your risk for heart disease and stroke  You may also need a blood or urine test to check for diabetes if you are at increased risk  Urine tests may be done to look for signs of an infection or kidney disease  · A physical exam  includes checking your heartbeat and lungs with a stethoscope  Your healthcare provider may also check your skin to look for sun damage  · Screening tests  may be recommended  A screening test is done to check for diseases that may not cause symptoms  The screening tests you may need depend on your age, gender, family history, and lifestyle habits  For example, colorectal screening may be recommended if you are 48years old or older  Screening tests you need if you are a woman:   · A Pap smear  is used to screen for cervical cancer  Pap smears are usually done every 3 to 5 years depending on your age  You may need them more often if you have had abnormal Pap smear test results in the past  Ask your healthcare provider how often you should have a Pap smear  · A mammogram  is an x-ray of your breasts to screen for breast cancer  Experts recommend mammograms every 2 years starting at age 48 years  You may need a mammogram at age 52 years or younger if you have an increased risk for breast cancer  Talk to your healthcare provider about when you should start having mammograms and how often you need them  Vaccines you may need:   · Get an influenza vaccine  every year  The influenza vaccine protects you from the flu  Several types of viruses cause the flu  The viruses change over time, so new vaccines are made each year  · Get a tetanus-diphtheria (Td) booster vaccine  every 10 years   This vaccine protects you against tetanus and diphtheria  Tetanus is a severe infection that may cause painful muscle spasms and lockjaw  Diphtheria is a severe bacterial infection that causes a thick covering in the back of your mouth and throat  · Get a human papillomavirus (HPV) vaccine  if you are female and aged 23 to 32 or male 23 to 24 and never received it  This vaccine protects you from HPV infection  HPV is the most common infection spread by sexual contact  HPV may also cause vaginal, penile, and anal cancers  · Get a pneumococcal vaccine  if you are aged 72 years or older  The pneumococcal vaccine is an injection given to protect you from pneumococcal disease  Pneumococcal disease is an infection caused by pneumococcal bacteria  The infection may cause pneumonia, meningitis, or an ear infection  · Get a shingles vaccine  if you are 60 or older, even if you have had shingles before  The shingles vaccine is an injection to protect you from the varicella-zoster virus  This is the same virus that causes chickenpox  Shingles is a painful rash that develops in people who had chickenpox or have been exposed to the virus  How to eat healthy:  My Plate is a model for planning healthy meals  It shows the types and amounts of foods that should go on your plate  Fruits and vegetables make up about half of your plate, and grains and protein make up the other half  A serving of dairy is included on the side of your plate  The amount of calories and serving sizes you need depends on your age, gender, weight, and height  Examples of healthy foods are listed below:  · Eat a variety of vegetables  such as dark green, red, and orange vegetables  You can also include canned vegetables low in sodium (salt) and frozen vegetables without added butter or sauces  · Eat a variety of fresh fruits , canned fruit in 100% juice, frozen fruit, and dried fruit  · Include whole grains    At least half of the grains you eat should be whole grains  Examples include whole-wheat bread, wheat pasta, brown rice, and whole-grain cereals such as oatmeal     · Eat a variety of protein foods such as seafood (fish and shellfish), lean meat, and poultry without skin (turkey and chicken)  Examples of lean meats include pork leg, shoulder, or tenderloin, and beef round, sirloin, tenderloin, and extra lean ground beef  Other protein foods include eggs and egg substitutes, beans, peas, soy products, nuts, and seeds  · Choose low-fat dairy products such as skim or 1% milk or low-fat yogurt, cheese, and cottage cheese  · Limit unhealthy fats  such as butter, hard margarine, and shortening  Exercise:  Exercise at least 30 minutes per day on most days of the week  Some examples of exercise include walking, biking, dancing, and swimming  You can also fit in more physical activity by taking the stairs instead of the elevator or parking farther away from stores  Include muscle strengthening activities 2 days each week  Regular exercise provides many health benefits  It helps you manage your weight, and decreases your risk for type 2 diabetes, heart disease, stroke, and high blood pressure  Exercise can also help improve your mood  Ask your healthcare provider about the best exercise plan for you  General health and safety guidelines:   · Do not smoke  Nicotine and other chemicals in cigarettes and cigars can cause lung damage  Ask your healthcare provider for information if you currently smoke and need help to quit  E-cigarettes or smokeless tobacco still contain nicotine  Talk to your healthcare provider before you use these products  · Limit alcohol  A drink of alcohol is 12 ounces of beer, 5 ounces of wine, or 1½ ounces of liquor  · Lose weight, if needed  Being overweight increases your risk of certain health conditions  These include heart disease, high blood pressure, type 2 diabetes, and certain types of cancer  · Protect your skin    Do not sunbathe or use tanning beds  Use sunscreen with a SPF 15 or higher  Apply sunscreen at least 15 minutes before you go outside  Reapply sunscreen every 2 hours  Wear protective clothing, hats, and sunglasses when you are outside  · Drive safely  Always wear your seatbelt  Make sure everyone in your car wears a seatbelt  A seatbelt can save your life if you are in an accident  Do not use your cell phone when you are driving  This could distract you and cause an accident  Pull over if you need to make a call or send a text message  · Practice safe sex  Use latex condoms if are sexually active and have more than one partner  Your healthcare provider may recommend screening tests for sexually transmitted infections (STIs)  · Wear helmets, lifejackets, and protective gear  Always wear a helmet when you ride a bike or motorcycle, go skiing, or play sports that could cause a head injury  Wear protective equipment when you play sports  Wear a lifejacket when you are on a boat or doing water sports  © Copyright [employer] 2022 Information is for End User's use only and may not be sold, redistributed or otherwise used for commercial purposes  All illustrations and images included in CareNotes® are the copyrighted property of A D A M , Inc  or [address] [place], [place] [id number]   The above information is an  only  It is not intended as medical advice for individual conditions or treatments  Talk to your doctor, nurse or pharmacist before following any medical regimen to see if it is safe and effective for you  Type 2 Diabetes Management for Adults   AMBULATORY CARE:   Type 2 diabetes  is a disease that affects how your body uses glucose (sugar)  Either your body cannot make enough insulin, or it cannot use the insulin correctly  It is important to keep diabetes controlled to prevent damage to your heart, blood vessels, and other organs         Have someone call your local emergency number (911 in the [address]) if:   1  You cannot be woken  2  You have signs of diabetic ketoacidosis:     ? confusion, fatigue    ? vomiting    ? rapid heartbeat    ? fruity smelling breath    ? extreme thirst    ? dry mouth and skin    3  You have any of the following signs of a heart attack:      ? Squeezing, pressure, or pain in your chest    ? You may  also have any of the following:     ? Discomfort or pain in your back, neck, jaw, stomach, or arm    ? Shortness of breath    ? Nausea or vomiting    ? Lightheadedness or a sudden cold sweat    4  You have any of the following signs of a stroke:      ? Numbness or drooping on one side of your face     ? Weakness in an arm or leg    ? Confusion or difficulty speaking    ? Dizziness, a severe headache, or vision loss    Call your doctor or diabetes care team if:   · You have a sore or wound that will not heal     · You have a change in the amount you urinate  · Your blood sugar levels are higher than your target goals  · You often have lower blood sugar levels than your target goals  · Your skin is red, dry, warm, or swollen  · You have trouble coping with diabetes, or you feel anxious or depressed  · You have questions or concerns about your condition or care  What you need to know about high blood sugar levels:  High blood sugar levels may not cause any symptoms  You may feel more thirsty or urinate more often than usual  Over time, high blood sugar levels can damage your nerves, blood vessels, tissues, and organs  The following can increase your blood sugar levels:  · Large meals or large amounts of carbohydrates at one time    · Less physical activity    · Stress    · Illness    · A lower dose of medicine or insulin, or a late dose    What you need to know about low blood sugar levels: You can prevent symptoms such as shakiness, dizziness, irritability, or confusion by preventing your blood sugar levels from going too low  1  Treat a low blood sugar level right away  ? Drink 4 ounces of juice or have 1 tube of glucose gel  ? Check your blood sugar level again 10 to 15 minutes later  ? When the level goes back to normal, eat a meal or snack to prevent another decrease  2  Keep glucose gel, raisins, or hard candy with you at all times to treat a low blood sugar level  3  Your blood sugar level can get too low if you take diabetes medicine or insulin and do not eat enough food  4  If you use insulin, check your blood sugar level before you exercise  ? If your blood sugar level is below 100 mg/dL, eat 4 crackers or 2 ounces of raisins, or drink 4 ounces of juice  ? Check your level every 30 minutes if you exercise more than 1 hour  ? You may need a snack during or after exercise  What you can do to manage your blood sugar levels:   1  Check your blood sugar levels as directed and as needed  Several items are available to use to check your levels  You may need to check by testing a drop of blood in a glucose monitor  You may instead be given a continuous glucose monitoring (CGM) device  The device is worn at all times  The CGM checks your blood sugar level every 5 minutes  It sends results to an electronic device such as a smart phone  A CGM can be used with or without an insulin pump  Talk with your provider to find out which method is best for you  The goal for blood sugar levels before meals  is between 80 and 130 mg/dL and 2 hours after eating  is lower than 180 mg/dL  2  Make healthy food choices  Work with a dietitian to develop a meal plan that works for you and your schedule  A dietitian can help you learn how to eat the right amount of carbohydrates during your meals and snacks  Carbohydrates can raise your blood sugar level if you eat too many at one time  Examples of foods that contain carbohydrates are breads, cereals, rice, pasta, and sweets  3  Get regular physical activity    Physical activity can help you get to your target blood sugar level goal and manage your weight  Get at least 150 minutes of moderate to vigorous aerobic physical activity each week  Do not miss more than 2 days in a row  Do not sit longer than 30 minutes at a time  Your healthcare provider can help you create an activity plan  The plan can include the best activities for you and can help you build your strength and endurance  4  Maintain a healthy weight  Ask your healthcare provider what a healthy weight is for you  Ask him or her to help you create a safe weight loss plan if you are overweight  5  Take your diabetes medicine or insulin as directed  You may need diabetes medicine, insulin, or both to help control your blood sugar levels  Your healthcare provider will teach you how and when to take your diabetes medicine or insulin  You will also be taught about side effects oral diabetes medicine can cause  Insulin may be injected, or given through a pump or pen  You and your care team will discuss which method is best for you  ? An insulin pump  is an implanted device that gives your insulin 24 hours a day  An insulin pump prevents the need for multiple insulin injections in a day  ? An insulin pen  is a device prefilled with the right amount of insulin  ? You and your family members will be taught how to draw up and give insulin  if this is the best method for you  Your education team will also teach you how to dispose of needles and syringes  ? You will learn how much insulin you need  and when to give it  You will be taught when not to give insulin  You will also be taught what to do if your blood sugar level drops too low  This may happen if you take insulin and do not eat the right amount of carbohydrates  Other things you can do to manage type 2 diabetes:   · Wear medical alert identification  Wear medical alert jewelry or carry a card that says you have diabetes   Ask your provider where to get these items          · Do not smoke  Nicotine and other chemicals in cigarettes and cigars can cause lung and blood vessel damage  It also makes it more difficult to manage your diabetes  Ask your provider for information if you currently smoke and need help to quit  Do not use e-cigarettes or smokeless tobacco in place of cigarettes or to help you quit  They still contain nicotine  · Check your feet each day for cuts, scratches, calluses, or other wounds  Look for redness and swelling, and feel for warmth  Wear shoes that fit well  Check your shoes for rocks or other objects that can hurt your feet  Do not walk barefoot or wear shoes without socks  Wear cotton socks to help keep your feet dry  · Ask about vaccines you may need  You have a higher risk for serious illness if you get the flu, pneumonia, COVID-19, or hepatitis  Ask your provider if you should get vaccines to prevent these or other diseases, and when to get the vaccines  · Talk to your care team if you become stressed about diabetes care  Sometimes being able to fit diabetes care into your life can cause increased stress  The stress can cause you not to take care of yourself properly  Your care team can help by offering tips about self-care  Your care team may suggest you talk to a mental health provider  The provider can listen and offer help with self-care issues  Follow up with your doctor or diabetes care team as directed: You may need to have blood tests done before your follow-up visit  The test results will show if changes need to be made in your treatment or self-care  Write down your questions so you remember to ask them during your visits  Talk to your provider if you cannot afford your medicine  © Copyright [employer] 2022 Information is for End User's use only and may not be sold, redistributed or otherwise used for commercial purposes   All illustrations and images included in CareNotes® are the copyrighted property of A  D A M , Inc  or [address] [facility]  The above information is an  only  It is not intended as medical advice for individual conditions or treatments  Talk to your doctor, nurse or pharmacist before following any medical regimen to see if it is safe and effective for you  Foot Care for People with Diabetes   AMBULATORY CARE:   What you need to know about foot care:   · Foot care helps protect your feet and prevent foot ulcers or sores  Long-term high blood sugar levels can damage the blood vessels and nerves in your legs and feet  This damage makes it hard to feel pressure, pain, temperature, and touch  You may not be able to feel a cut or sore, or shoes that are too tight  Foot care is needed to prevent serious problems, such as an infection or amputation  · Diabetes may cause your toes to become crooked or curved under  These changes may affect the way you walk and can lead to increased pressure on your foot  The pressure can decrease blood flow to your feet  Lack of blood flow increases your risk for a foot ulcer  Do not ignore small problems, such as dry skin or small wounds  These can become life-threatening over time without proper care  Call your care team provider if:   · Your feet become numb, weak, or hard to move  · You have pus draining from a sore on your foot  · You have a wound on your foot that gets bigger, deeper, or does not heal      · You see blisters, cuts, scratches, calluses, or sores on your foot  · You have a fever, and your feet become red, warm, and swollen  · Your toenails become thick, curled, or yellow  · You find it hard to check your feet because your vision is poor  · You have questions or concerns about your condition or care  How to care for your feet:   · Check your feet each day  Look at your whole foot, including the bottom, and between and under your toes  Check for wounds, corns, and calluses   Use a mirror to see the bottom of your feet  The skin on your feet may be shiny, tight, or darker than normal  Your feet may also be cold and pale  Feel your feet by running your hands along the tops, bottoms, sides, and between your toes  Redness, swelling, and warmth are signs of blood flow problems that can lead to a foot ulcer  Do not try to remove corns or calluses yourself  · Wash your feet each day with soap and warm water  Do not use hot water, because this can injure your foot  Dry your feet gently with a towel after you wash them  Dry between and under your toes  · Apply lotion or a moisturizer on your dry feet  Ask your care team provider what lotions are best to use  Do not put lotion or moisturizer between your toes  Moisture between your toes could lead to skin breakdown  · Cut your toenails correctly  File or cut your toenails straight across  Use a soft brush to clean around your toenails  If your toenails are very thick, you may need to have a care team provider or specialist cut them  · Protect your feet  Do not walk barefoot or wear your shoes without socks  Check your shoes for rocks or other objects that can hurt your feet  Wear cotton socks to help keep your feet dry  Wear socks without toe seams, or wear them with the seams inside out  Change your socks each day  Do not wear socks that are dirty or damp  · Wear shoes that fit well  Wear shoes that do not rub against any area of your feet  Your shoes should be ½ to ¾ inch (1 to 2 centimeters) longer than your feet  Your shoes should also have extra space around the widest part of your feet  Walking or athletic shoes with laces or straps that adjust are best  Ask your care team provider for help to choose shoes that fit you best  Ask him or her if you need to wear an insert, orthotic, or bandage on your feet  · Go to your follow-up visits  Your care team provider will do a foot exam at least once a year   You may need a foot exam more often if you have nerve damage, foot deformities, or ulcers  He will check for nerve damage and how well you can feel your feet  He will check your shoes to see if they fit well  · Do not smoke  Smoking can damage your blood vessels and put you at increased risk for foot ulcers  Ask your care team provider for information if you currently smoke and need help to quit  E-cigarettes or smokeless tobacco still contain nicotine  Talk to your care team provider before you use these products  Follow up with your diabetes care team provider or foot specialist as directed: You will need to have your feet checked at least once a year  You may need a foot exam more often if you have nerve damage, foot deformities, or ulcers  Write down your questions so you remember to ask them during your visits  © Copyright [employer] 2022 Information is for End User's use only and may not be sold, redistributed or otherwise used for commercial purposes  All illustrations and images included in CareNotes® are the copyrighted property of A D A M , Inc  or [facility] [employer] [facility]  The above information is an  only  It is not intended as medical advice for individual conditions or treatments  Talk to your doctor, nurse or pharmacist before following any medical regimen to see if it is safe and effective for you  Chief Complaint     Chief Complaint   Patient presents with   • Physical Exam         History of Present Illness       Reviewed labs  Recent labs show new onset type 2 diabetes-HA1C is 6  6  prior to this HA1C was in prediabetic range  Hx of high cholesterol  Recent lipid panel still resulting high cholesterol with an increase in triglycerides  Patient states she was on statins before but developed hives  Discussed lifestyle modifications with patient  Review of Systems   Review of Systems   Constitutional: Negative for activity change, diaphoresis, fatigue and fever     HENT: Negative for congestion, facial swelling, hearing loss, rhinorrhea, sinus pressure, sinus pain, sneezing, sore throat and voice change  Eyes: Negative for discharge and visual disturbance  Respiratory: Negative for cough, choking, chest tightness, shortness of breath, wheezing and stridor  Cardiovascular: Negative for chest pain, palpitations and leg swelling  Gastrointestinal: Negative for abdominal distention, abdominal pain, constipation, diarrhea, nausea and vomiting  Endocrine: Negative for polydipsia, polyphagia and polyuria  Genitourinary: Negative for difficulty urinating, dysuria, flank pain, frequency and urgency  Musculoskeletal: Negative for arthralgias, back pain, gait problem, joint swelling, myalgias, neck pain and neck stiffness  Skin: Negative for color change, rash and wound  Neurological: Negative for dizziness, syncope, speech difficulty, weakness, light-headedness and headaches  Hematological: Negative for adenopathy  Does not bruise/bleed easily  Psychiatric/Behavioral: Negative for agitation, behavioral problems, confusion, decreased concentration, dysphoric mood, hallucinations, sleep disturbance and suicidal ideas  The patient is not nervous/anxious            Current Medications       Current Outpatient Medications:   •  ezetimibe (ZETIA) 10 mg tablet, Take 1 tablet (10 mg total) by mouth daily, Disp: 90 tablet, Rfl: 1  •  levothyroxine 100 mcg tablet, Take 1 tablet (100 mcg total) by mouth daily, Disp: 30 tablet, Rfl: 0  •  cyanocobalamin (VITAMIN B-12) 100 mcg tablet, Take 100 mcg by mouth daily  , Disp: , Rfl:   •  Magnesium Oxide 140 MG CAPS, Take 140 mg by mouth daily  , Disp: , Rfl:   •  pantoprazole (PROTONIX) 40 mg tablet, Take 1 tablet (40 mg total) by mouth daily, Disp: 90 tablet, Rfl: 0  •  Probiotic Product (PROBIOTIC-10) CAPS, Take 1 tablet by mouth daily  , Disp: , Rfl:     Current Allergies     Allergies as of 10/19/2022 - Reviewed 10/19/2022   Allergen Reaction Noted   • Sulfa antibiotics Anaphylaxis 06/24/2016   • Statins Hives 09/28/2021            The following portions of the patient's history were reviewed and updated as appropriate: allergies, current medications, past family history, past medical history, past social history, past surgical history and problem list      Past Medical History:   Diagnosis Date   • Anxiety    • COVID-19     in march   • Disease of thyroid gland        Past Surgical History:   Procedure Laterality Date   • BREAST BIOPSY Right     benign   • FRACTURE SURGERY Right     arm   • WISDOM TOOTH EXTRACTION         Family History   Problem Relation Age of Onset   • Dementia Mother    • Heart failure Mother    • Diabetes Mother    • Cancer Father    • Pancreatic cancer Father    • Uterine cancer Sister 58   • Dementia Sister    • No Known Problems Sister    • No Known Problems Sister    • No Known Problems Maternal Grandmother    • No Known Problems Maternal Grandfather    • No Known Problems Paternal Grandmother    • No Known Problems Paternal Grandfather    • Colon cancer Neg Hx    • Colon polyps Neg Hx          Medications have been verified  Objective   /80   Pulse 98   Ht 5' 3 75" (1 619 m)   Wt 102 kg (223 lb 12 8 oz)   SpO2 97%   BMI 38 72 kg/m²        Physical Exam     Physical Exam  Vitals and nursing note reviewed  Constitutional:       General: She is not in acute distress  Appearance: Normal appearance  She is obese  She is not ill-appearing, toxic-appearing or diaphoretic  HENT:      Head: Normocephalic  Eyes:      Extraocular Movements: Extraocular movements intact  Pupils: Pupils are equal, round, and reactive to light  Cardiovascular:      Rate and Rhythm: Normal rate and regular rhythm  Pulses: no weak pulses     Heart sounds: Normal heart sounds  No murmur heard  No friction rub  No gallop  Pulmonary:      Effort: No respiratory distress  Breath sounds: Normal breath sounds   No wheezing or rhonchi  Abdominal:      General: Bowel sounds are normal       Palpations: Abdomen is soft  Tenderness: There is no abdominal tenderness  Musculoskeletal:         General: No swelling, deformity or signs of injury  Normal range of motion  Cervical back: Normal range of motion  No rigidity  Feet:      Right foot:      Skin integrity: No ulcer, skin breakdown, erythema, warmth, callus or dry skin  Left foot:      Skin integrity: No ulcer, skin breakdown, erythema, warmth, callus or dry skin  Skin:     General: Skin is warm and dry  Coloration: Skin is not pale  Findings: No erythema  Neurological:      General: No focal deficit present  Mental Status: She is alert and oriented to person, place, and time  Psychiatric:         Mood and Affect: Mood normal          Behavior: Behavior normal          Thought Content: Thought content normal          Judgment: Judgment normal        Patient's shoes and socks removed  Right Foot/Ankle   Right Foot Inspection  Skin Exam: skin normal and skin intact  No dry skin, no warmth, no callus, no erythema, no maceration, no abnormal color, no pre-ulcer, no ulcer and no callus  Toe Exam: ROM and strength within normal limits  Sensory   Monofilament testing: intact    Left Foot/Ankle  Left Foot Inspection  Skin Exam: skin normal and skin intact  No dry skin, no warmth, no erythema, no maceration, normal color, no pre-ulcer, no ulcer and no callus  Toe Exam: ROM and strength within normal limits  Sensory   Monofilament testing: intact    Assign Risk Category  No deformity present  Loss of protective sensation  No weak pulses  Risk: 1      BMI Counseling: Body mass index is 38 72 kg/m²  The BMI is above normal  Nutrition recommendations include reducing portion sizes, consuming healthier snacks and moderation in carbohydrate intake   Exercise recommendations include exercising 3-5 times per week and strength training exercises

## 2022-10-19 NOTE — PROGRESS NOTES
ADULT ANNUAL PHYSICAL  [address]    NAME: Joaquina Watkins  AGE: 65 y o  SEX: female  : 1960     DATE: 10/19/2022     Assessment and Plan:     Problem List Items Addressed This Visit    None     Visit Diagnoses     Annual physical exam    -  Primary    Breast cancer screening by mammogram        Relevant Orders    Mammo screening bilateral w 3d & cad          Immunizations and preventive care screenings were discussed with patient today  Appropriate education was printed on patient's after visit summary  Counseling:  · Alcohol/drug use: discussed moderation in alcohol intake, the recommendations for healthy alcohol use, and avoidance of illicit drug use  BMI Counseling: Body mass index is 38 72 kg/m²  The BMI is above normal  Nutrition recommendations include encouraging healthy choices of fruits and vegetables, moderation in carbohydrate intake, increasing intake of lean protein, reducing intake of saturated and trans fat and reducing intake of cholesterol  Exercise recommendations include moderate physical activity 150 minutes/week and exercising 3-5 times per week  Patient referred to nutritionist  Rationale for BMI follow-up plan is due to patient being overweight or obese  Depression Screening and Follow-up Plan: Patient was screened for depression during today's encounter  They screened negative with a PHQ-2 score of 0  No follow-ups on file  Chief Complaint:     Chief Complaint   Patient presents with   • Physical Exam      History of Present Illness:     Adult Annual Physical   Patient here for a comprehensive physical exam  The patient reports no problems  Diet and Physical Activity  · Diet/Nutrition: well balanced diet  · Exercise: walking        Depression Screening  PHQ-2/9 Depression Screening    Little interest or pleasure in doing things: 0 - not at all  Feeling down, depressed, or hopeless: 0 - not at all  PHQ-2 Score: 0  PHQ-2 Interpretation: Negative depression screen       General Health  · Sleep: gets 4-6 hours of sleep on average  · Hearing: normal - bilateral   · Vision: goes for regular eye exams and wears glasses  · Dental: regular dental visits and brushes teeth twice daily  /GYN Health  · Patient is: postmenopausal  ·      Review of Systems:     Review of Systems   Constitutional: Negative for activity change, diaphoresis, fatigue and fever  HENT: Negative for congestion, facial swelling, hearing loss, rhinorrhea, sinus pressure, sinus pain, sneezing, sore throat and voice change  Eyes: Negative for discharge and visual disturbance  Respiratory: Negative for cough, choking, chest tightness, shortness of breath, wheezing and stridor  Cardiovascular: Negative for chest pain, palpitations and leg swelling  Gastrointestinal: Negative for abdominal distention, abdominal pain, constipation, diarrhea, nausea and vomiting  Endocrine: Negative for polydipsia, polyphagia and polyuria  Genitourinary: Negative for difficulty urinating, dysuria, frequency and urgency  Musculoskeletal: Negative for arthralgias, back pain, gait problem, joint swelling, myalgias, neck pain and neck stiffness  Skin: Negative for color change, rash and wound  Neurological: Negative for dizziness, syncope, speech difficulty, weakness, light-headedness and headaches  Hematological: Negative for adenopathy  Does not bruise/bleed easily  Psychiatric/Behavioral: Negative for agitation, behavioral problems, confusion, hallucinations, sleep disturbance and suicidal ideas  The patient is not nervous/anxious         Past Medical History:     Past Medical History:   Diagnosis Date   • Anxiety    • COVID-19     in march   • Disease of thyroid gland       Past Surgical History:     Past Surgical History:   Procedure Laterality Date   • BREAST BIOPSY Right     benign   • FRACTURE SURGERY Right     arm   • WISDOM TOOTH EXTRACTION        Social History:     Social History     Socioeconomic History   • Marital status: /Civil Union     Spouse name: Kai Powell"   • Number of children: 0   • Years of education: None   • Highest education level: None   Occupational History   • Occupation:    Tobacco Use   • Smoking status: Never Smoker   • Smokeless tobacco: Never Used   Vaping Use   • Vaping Use: Never used   Substance and Sexual Activity   • Alcohol use:  Yes     Alcohol/week: 2 0 standard drinks     Types: 2 Glasses of wine per week   • Drug use: No   • Sexual activity: None   Other Topics Concern   • None   Social History Narrative   • None     Social Determinants of Health     Financial Resource Strain: Not on file   Food Insecurity: Not on file   Transportation Needs: Not on file   Physical Activity: Not on file   Stress: Not on file   Social Connections: Not on file   Intimate Partner Violence: Not on file   Housing Stability: Not on file      Family History:     Family History   Problem Relation Age of Onset   • Dementia Mother    • Heart failure Mother    • Diabetes Mother    • Cancer Father    • Pancreatic cancer Father    • Uterine cancer Sister 58   • Dementia Sister    • No Known Problems Sister    • No Known Problems Sister    • No Known Problems Maternal Grandmother    • No Known Problems Maternal Grandfather    • No Known Problems Paternal Grandmother    • No Known Problems Paternal Grandfather    • Colon cancer Neg Hx    • Colon polyps Neg Hx       Current Medications:     Current Outpatient Medications   Medication Sig Dispense Refill   • cyanocobalamin (VITAMIN B-12) 100 mcg tablet Take 100 mcg by mouth daily       • levothyroxine 100 mcg tablet Take 1 tablet (100 mcg total) by mouth daily 30 tablet 0   • Magnesium Oxide 140 MG CAPS Take 140 mg by mouth daily       • pantoprazole (PROTONIX) 40 mg tablet Take 1 tablet (40 mg total) by mouth daily 90 tablet 0   • Probiotic Product (PROBIOTIC-10) CAPS Take 1 tablet by mouth daily         No current facility-administered medications for this visit  Allergies: Allergies   Allergen Reactions   • Sulfa Antibiotics Anaphylaxis   • Statins Hives      Physical Exam:     /80   Pulse 98   Ht 5' 3 75" (1 619 m)   Wt 102 kg (223 lb 12 8 oz)   SpO2 97%   BMI 38 72 kg/m²     Physical Exam  Vitals and nursing note reviewed  Constitutional:       General: She is not in acute distress  Appearance: She is well-developed  She is obese  HENT:      Head: Normocephalic and atraumatic  Nose: No congestion or rhinorrhea  Mouth/Throat:      Mouth: Mucous membranes are moist    Eyes:      Conjunctiva/sclera: Conjunctivae normal    Cardiovascular:      Rate and Rhythm: Normal rate and regular rhythm  Heart sounds: No murmur heard  No friction rub  No gallop  Pulmonary:      Effort: Pulmonary effort is normal  No respiratory distress  Breath sounds: Normal breath sounds  No rhonchi  Abdominal:      Palpations: Abdomen is soft  Tenderness: There is no abdominal tenderness  Musculoskeletal:      Cervical back: Neck supple  Skin:     General: Skin is warm and dry  Capillary Refill: Capillary refill takes less than 2 seconds  Neurological:      General: No focal deficit present  Mental Status: She is alert  Psychiatric:         Mood and Affect: Mood normal          Behavior: Behavior normal          Thought Content:  Thought content normal          Judgment: Judgment normal         PHQ-2/9 Depression Screening    Little interest or pleasure in doing things: 0 - not at all  Feeling down, depressed, or hopeless: 0 - not at all  PHQ-2 Score: 0  PHQ-2 Interpretation: Negative depression screen           Mikala Rocha, [address] [facility]

## 2022-10-28 ENCOUNTER — TELEPHONE (OUTPATIENT)
Dept: OTHER | Facility: OTHER | Age: 62
End: 2022-10-28

## 2022-10-28 DIAGNOSIS — E03.9 HYPOTHYROIDISM, UNSPECIFIED TYPE: ICD-10-CM

## 2022-10-28 NOTE — TELEPHONE ENCOUNTER
[facility] saying that the patient is not going to get her medication levothyroxine 100 mcg in time and is asking if the office can call in a 14 day supply of the patient medication levothyroxine 100 mcg to her local pharmacy [facility] on [address] in [facility]

## 2022-10-31 RX ORDER — LEVOTHYROXINE SODIUM 0.1 MG/1
100 TABLET ORAL DAILY
Qty: 30 TABLET | Refills: 0 | Status: SHIPPED | OUTPATIENT
Start: 2022-10-31 | End: 2022-11-02 | Stop reason: SDUPTHER

## 2022-11-02 DIAGNOSIS — E03.9 HYPOTHYROIDISM, UNSPECIFIED TYPE: ICD-10-CM

## 2022-11-02 RX ORDER — LEVOTHYROXINE SODIUM 0.1 MG/1
100 TABLET ORAL DAILY
Qty: 90 TABLET | Refills: 1 | Status: SHIPPED | OUTPATIENT
Start: 2022-11-02

## 2022-12-29 DIAGNOSIS — R10.84 GENERALIZED ABDOMINAL PAIN: ICD-10-CM

## 2022-12-29 RX ORDER — PANTOPRAZOLE SODIUM 40 MG/1
40 TABLET, DELAYED RELEASE ORAL DAILY
Qty: 90 TABLET | Refills: 0 | Status: SHIPPED | OUTPATIENT
Start: 2022-12-29

## 2023-02-16 ENCOUNTER — TELEPHONE (OUTPATIENT)
Dept: FAMILY MEDICINE CLINIC | Facility: CLINIC | Age: 63
End: 2023-02-16

## 2023-02-16 DIAGNOSIS — E11.9 NEW ONSET TYPE 2 DIABETES MELLITUS (HCC): Primary | ICD-10-CM

## 2023-02-16 NOTE — TELEPHONE ENCOUNTER
Spoke with patient will go for labs order in October 2022 and I will add MicroAlbumin to order  May hold off on LP due to fact that she had been off of the Zetia      Also sent note to [facility] exam 2022 at [facility]

## 2023-02-17 ENCOUNTER — TELEPHONE (OUTPATIENT)
Dept: ADMINISTRATIVE | Facility: OTHER | Age: 63
End: 2023-02-17

## 2023-02-17 NOTE — TELEPHONE ENCOUNTER
Upon review of the In Basket request and the patient's chart, initial outreach has been made via fax to facility  Please see Contacts section for details       Thank you  Zoraida Lo

## 2023-02-17 NOTE — TELEPHONE ENCOUNTER
----- Message from Malvin Baig MA sent at 2/16/2023  4:04 PM EST -----  Regarding: [facility] Request DM Eye Exam  02/16/23 4:04 PM    Hello, our patient attached above has had Diabetic Eye Exam completed/performed  Please assist in updating the patient chart by making an External outreach to:    [address] [place] [address], [address] [place] [address] Road  [phone]       The date of service is 2022      Thank you,  BRIANDA Doll  [facility]

## 2023-02-20 NOTE — TELEPHONE ENCOUNTER
Per Dr. Jonas West patient can be worked in for ED follow up on 9/6 at 2:00pm. Please contact patient to advise and confirm. Appointment made to hold time.  Lalitha Conteh CMA    Upon review of the In Basket request we were able to locate, review, and update the patient chart as requested for Diabetic Eye Exam     Any additional questions or concerns should be emailed to the Practice Liaisons via the appropriate education email address, please do not reply via In Basket      Thank you  Claire Montalvo

## 2023-02-23 ENCOUNTER — TELEPHONE (OUTPATIENT)
Dept: FAMILY MEDICINE CLINIC | Facility: CLINIC | Age: 63
End: 2023-02-23

## 2023-02-23 NOTE — TELEPHONE ENCOUNTER
Pt wants to be tested for sleep apnea, she has a family history and says sometimes she wakes up in the middle of the night gasping for air   She would like to know if you have any recommendations on where she should go for this

## 2023-02-24 DIAGNOSIS — G47.30 SLEEP APNEA, UNSPECIFIED TYPE: Primary | ICD-10-CM

## 2023-05-30 ENCOUNTER — OFFICE VISIT (OUTPATIENT)
Dept: SLEEP CENTER | Facility: HOSPITAL | Age: 63
End: 2023-05-30

## 2023-05-30 VITALS
WEIGHT: 216 LBS | DIASTOLIC BLOOD PRESSURE: 80 MMHG | SYSTOLIC BLOOD PRESSURE: 122 MMHG | HEART RATE: 79 BPM | HEIGHT: 64 IN | OXYGEN SATURATION: 95 % | BODY MASS INDEX: 36.88 KG/M2

## 2023-05-30 DIAGNOSIS — K21.9 GASTROESOPHAGEAL REFLUX DISEASE, UNSPECIFIED WHETHER ESOPHAGITIS PRESENT: ICD-10-CM

## 2023-05-30 DIAGNOSIS — F45.8 BRUXISM: ICD-10-CM

## 2023-05-30 DIAGNOSIS — R00.2 PALPITATIONS: ICD-10-CM

## 2023-05-30 DIAGNOSIS — G47.9 SLEEP DISTURBANCE: ICD-10-CM

## 2023-05-30 DIAGNOSIS — E66.9 OBESITY (BMI 30-39.9): ICD-10-CM

## 2023-05-30 DIAGNOSIS — G47.33 OBSTRUCTIVE SLEEP APNEA SYNDROME: Primary | ICD-10-CM

## 2023-05-30 NOTE — PROGRESS NOTES
Consultation - [address] [facility]  65 y o  female  :1960  NQP:[id number]  DOS:2023    Physician Requesting Consult: Emery Silver             Reason for Consult : At your kind request I saw Qian Gordon for initial sleep evaluation today  The patient is here to evaluate for suspected Obstructive Sleep Apnea  PFSH, Problem List, Medications & Allergies were reviewed in EMR  Kenny Vaca  has a past medical history of Anxiety, COVID-19, and Disease of thyroid gland  She has a current medication list which includes the following prescription(s): cyanocobalamin, ezetimibe, levothyroxine, magnesium oxide, pantoprazole, and probiotic-10  HPI: Patient stated reason for visit [Snoring/breathing pauses in sleep ]   [Pascale reports is aware of Snore loudly] as noted by her  off at least 1 years duration  Other Complaints: Occasional leg cramps that can awaken her  Restless Leg Syndrome:   reports no suggestive symptoms  Parasomnia:reports teeth grinding during sleep;   Sleep Routine (on average): Irregular because because she acquired a few young puppies  Typical Bedtime: [2-6 AM]  Gets OOB:[8-9 AM]  [She estimates getting 5-6 hrs sleep ]   Sleep latency:< 15 minutes Sleep Interruptions:  [3 to 4 times per night] /nite [Family/Pets] struggles to fall back asleep because of racing thoughts  He has family related psychosocial stresses    Awakens:[Wake on own, without alarm]   Upon awakening: [Sometimes]usually feels refreshed  Kenny Vaca reports episodic   daytime sleepiness [feels like napping but does not have the opportunity]  She rated [herself] at Total score: 6 /24 on the [place] Sleepiness Scale  Habits:   reports that she has never smoked  She has never used smokeless tobacco ;  reports current alcohol use of about 2 0 standard drinks of alcohol per week ;[ reports no history of drug use  ];[  E-Cigarette/Vaping   • E-Cigarette Use Never User    ];  Caffeine use:very little; "Exercise routine: regular  Family History: Father has obstructive sleep apnea  ROS: Significant for approximately 20 pounds weight gain in the past 2 years  She reported no nasal or respiratory symptoms  She has random palpitations  She has diet related acid reflux  A 10 point review of systems was otherwise negative  Silva Mccauley EXAM:  /80 (BP Location: Right arm, Patient Position: Sitting, Cuff Size: Standard)   Pulse 79   Ht 5' 4\" (1 626 m)   Wt 98 kg (216 lb)   SpO2 95%   BMI 37 08 kg/m²    General: Well groomed female, well appearing, in no apparent distress  Neurological: Alert and orientated; cooperative; Cranial nerves intact;    Psychiatric: Speech: Clear and coherent; normal mood, affect & thought   Skin: Warm and dry; Color& Hydration good; no facial rashes or lesions   HEENT:  Craniofacial anatomy: normal Sinuses: Non-tender  TMJ: Normal   Eyes: EOM's intact; conjunctiva/corneas clear   Ears: Externally appear normal     Nasal Airway: is patent Septum: Intact; Mucosa: Normal; Turbinates: Normal; Rhinorrhea: None  Mouth: Lips: Normal posture; Dentition: normal   Mucosa: Moist; Hard Palate:normal    Oropharryx: crowded and AP narrowing Tongue: Mallampati:Class IV, Mobile and ScallopedSoft Palate:  redundant  Tonsils: absent  Neck:[is thick;] [Neck Circumference: 15 \";] Supple; no abnormal masses; Thyroid: Normal  Trachea: Central     Lymph: No cervical or submandibular Lymhadenopathy  Heart: S1,S2 normal; RRR; no gallop; no murmur s  Lungs: Respiratory Effort: Normal  Air entry good bilaterally  No wheezes  No rales  Abdomen: Obese, soft & non-tender    Extremities: No pedal edema  No clubbing or cyanosis  Musculoskeletal:  Motor normal; Gait: Normal        Last CMP demonstrated normal CO2, elevated fasting glucose but otherwise normal    IMPRESSION: Primary/Secondary Sleep Diagnoses (to Medical or Psych conditions) & Comorbidities   1   Obstructive sleep apnea syndrome  Ambulatory " "Referral to Sleep Medicine    Home Study      2  Sleep disturbance        3  Bruxism        4  Palpitations        5  Gastroesophageal reflux disease, unspecified whether esophagitis present        6  Obesity (BMI 30-39  9)             PLAN:  1  With respect to above conditions, comprehensive counseling provided on pathophysiology; effects on symptoms and comorbidities, diagnostic strategies & limitations; treatment options; risks or no treament; risks & benefits of the various therapeutic options; costs and insurance aspects  2  I advised weight reduction, avoiding sleeping supine, using alcohol or sedating medications close to bed time and on safe driving practices  3  Nocturnal polysomnography is indicated and HST will be scheduled  She understands the limitations  4   Patient is willing to try Positive airway pressure therapy and will be scheduled for a titration study if indicated  5  Follow-up to be scheduled after the studies to review results, further details of treatment options and to initiate/adjust therapy  Sincerely,       Authenticated electronically on 07/18/49   Board Certified Specialist     Portions of the record may have been created with voice recognition software  Occasional wrong word or \"sound a like\" substitutions may have occurred due to the inherent limitations of voice recognition software  There may also be notations and random deletions of words or characters from malfunctioning software  Read the chart carefully and recognize, using context, where substitutions/deletions have occurred       "

## 2023-05-30 NOTE — PATIENT INSTRUCTIONS
What is MIRACLE? Obstructive sleep apnea is a common and serious sleep disorder that causes you to stop breathing during sleep  The airway repeatedly becomes blocked, limiting the amount of air that reaches your lungs  When this happens, you may snore loudly or making choking noises as you try to breathe  Your brain and body becomes oxygen deprived and you may wake up  This may happen a few times a night, or in more severe cases, several hundred times a night  Sleep apnea can make you wake up in the morning feeling tired or unrefreshed even though you have had a full night of sleep  During the day, you may feel fatigued, have difficulty concentrating or you may even unintentionally fall asleep  This is because your body is waking up numerous times throughout the night, even though you might not be conscious of each awakening  The lack of oxygen your body receives can have negative long-term consequences for your health  This includes:  High blood pressure  Heart disease  Irregular heart rhythms  Stroke  Pre-diabetes and diabetes  Depression  Testing  An objective evaluation of your sleep may be needed before your board certified sleep physician can make a diagnosis  Options include:   In-lab overnight sleep study  This type of sleep study requires you to stay overnight at a sleep center, in a bed that may resemble a hotel room  You will sleep with sensors hooked up to various parts of your body  These sensors record your brain waves, heartbeat, breathing and movement  An overnight sleep study also provides your doctor with the most complete information about your sleep  Learn more about an overnight sleep study  Home sleep apnea test  Some patients with high risk factors for obstructive sleep apnea and no other medical disorders may be candidates for a home sleep apnea test  The testing equipment differs in that it is less complicated than what is used in an overnight sleep study   As such, does not give all the data an in-lab will and if negative, may not mean you do not have the problem  Treatment for sleep apnea includes using a continuous positive airway pressure (CPAP) machine to keep your airway open during sleep  A mask is placed over your nose and mouth, or just your nose  The mask is hooked to the CPAP machine that blows a gentle stream of air into the mask when you breathe  This helps keep your airway open so you can breathe more regularly  Extra oxygen may be given to you through the machine  You may be given a mouth device  It looks like a mouth guard or dental retainer and stops your tongue and mouth tissues from blocking your throat while you sleep  Surgery may be needed to remove extra tissues that block your mouth, throat, or nose  Manage sleep apnea:   Do not smoke  Nicotine and other chemicals in cigarettes and cigars can cause lung damage  Ask your healthcare provider for information if you currently smoke and need help to quit  E-cigarettes or smokeless tobacco still contain nicotine  Talk to your healthcare provider before you use these products  Do not drink alcohol or take sedative medicine before you go to sleep  Alcohol and sedatives can relax the muscles and tissues around your throat  This can block the airflow to your lungs  Maintain a healthy weight  Excess tissue around your throat may restrict your breathing  Ask your healthcare provider for information if you need to lose weight  Sleep on your side or use pillows designed to prevent sleep apnea  This prevents your tongue or other tissues from blocking your throat  You can also raise the head of your bed  Driving Safety  Refrain from driving when drowsy  Follow up with your healthcare provider as directed: Write down your questions so you remember to ask them during your visits  Go to AASM website for more information: Sleepeducation  org  What is MIRACLE?    Obstructive sleep apnea is a common and serious sleep disorder that causes you to stop breathing during sleep  The airway repeatedly becomes blocked, limiting the amount of air that reaches your lungs  When this happens, you may snore loudly or making choking noises as you try to breathe  Your brain and body becomes oxygen deprived and you may wake up  This may happen a few times a night, or in more severe cases, several hundred times a night  Sleep apnea can make you wake up in the morning feeling tired or unrefreshed even though you have had a full night of sleep  During the day, you may feel fatigued, have difficulty concentrating or you may even unintentionally fall asleep  This is because your body is waking up numerous times throughout the night, even though you might not be conscious of each awakening  The lack of oxygen your body receives can have negative long-term consequences for your health  This includes:  High blood pressure  Heart disease  Irregular heart rhythms  Stroke  Pre-diabetes and diabetes  Depression  Testing  An objective evaluation of your sleep may be needed before your board certified sleep physician can make a diagnosis  Options include:   In-lab overnight sleep study  This type of sleep study requires you to stay overnight at a sleep center, in a bed that may resemble a hotel room  You will sleep with sensors hooked up to various parts of your body  These sensors record your brain waves, heartbeat, breathing and movement  An overnight sleep study also provides your doctor with the most complete information about your sleep  Learn more about an overnight sleep study  Home sleep apnea test  Some patients with high risk factors for obstructive sleep apnea and no other medical disorders may be candidates for a home sleep apnea test  The testing equipment differs in that it is less complicated than what is used in an overnight sleep study  As such, does not give all the data an in-lab will and if negative, may not mean you do not have the problem    Treatment for sleep apnea includes using a continuous positive airway pressure (CPAP) machine to keep your airway open during sleep  A mask is placed over your nose and mouth, or just your nose  The mask is hooked to the CPAP machine that blows a gentle stream of air into the mask when you breathe  This helps keep your airway open so you can breathe more regularly  Extra oxygen may be given to you through the machine  You may be given a mouth device  It looks like a mouth guard or dental retainer and stops your tongue and mouth tissues from blocking your throat while you sleep  Surgery may be needed to remove extra tissues that block your mouth, throat, or nose  Manage sleep apnea:   Do not smoke  Nicotine and other chemicals in cigarettes and cigars can cause lung damage  Ask your healthcare provider for information if you currently smoke and need help to quit  E-cigarettes or smokeless tobacco still contain nicotine  Talk to your healthcare provider before you use these products  Do not drink alcohol or take sedative medicine before you go to sleep  Alcohol and sedatives can relax the muscles and tissues around your throat  This can block the airflow to your lungs  Maintain a healthy weight  Excess tissue around your throat may restrict your breathing  Ask your healthcare provider for information if you need to lose weight  Sleep on your side or use pillows designed to prevent sleep apnea  This prevents your tongue or other tissues from blocking your throat  You can also raise the head of your bed  Driving Safety  Refrain from driving when drowsy  Follow up with your healthcare provider as directed: Write down your questions so you remember to ask them during your visits  Go to AAS website for more information: Sleepeducation  org  Nursing Support:  When: Monday through Friday 7A-5PM except holidays  Where: Our direct line is [phone]  If you are having a true emergency please call 911    In the event that the line is busy or it is after hours please leave a voice message and we will return your call  Please speak clearly, leaving your full name, birth date, best number to reach you and the reason for your call  Medication refills: We will need the name of the medication, the dosage, the ordering provider, whether you get a 30 or 90 day refill, and the pharmacy name and address  Medications will be ordered by the provider only  Nurses cannot call in prescriptions  Please allow 7 days for medication refills  Physician requested updates: If your provider requested that you call with an update after starting medication, please be ready to provide us the medication and dosage, what time you take your medication, the time you attempt to fall asleep, time you fall asleep, when you wake up, and what time you get out of bed  Sleep Study Results: We will contact you with sleep study results and/or next steps after the physician has reviewed your testing

## 2023-06-08 NOTE — TELEPHONE ENCOUNTER
HAS OV 2/16/18- LM WILL REVIEW THEN    ----- Message from [address] sent at 2/12/2018  8:44 AM EST -----  Please call the patient regarding her abnormal result   Cholesterol is very high- will discuss at physical appt, otherwise normal results Dapsone Pregnancy And Lactation Text: This medication is Pregnancy Category C and is not considered safe during pregnancy or breast feeding.

## 2023-06-16 LAB
ALBUMIN SERPL-MCNC: 4.5 G/DL (ref 3.8–4.8)
ALBUMIN/GLOB SERPL: 1.7 {RATIO} (ref 1.2–2.2)
ALP SERPL-CCNC: 89 IU/L (ref 44–121)
ALT SERPL-CCNC: 32 IU/L (ref 0–32)
AST SERPL-CCNC: 33 IU/L (ref 0–40)
BILIRUB SERPL-MCNC: 0.4 MG/DL (ref 0–1.2)
BUN SERPL-MCNC: 14 MG/DL (ref 8–27)
BUN/CREAT SERPL: 17 (ref 12–28)
CALCIUM SERPL-MCNC: 9.8 MG/DL (ref 8.7–10.3)
CHLORIDE SERPL-SCNC: 107 MMOL/L (ref 96–106)
CHOLEST SERPL-MCNC: 258 MG/DL (ref 100–199)
CHOLEST/HDLC SERPL: 4.8 RATIO (ref 0–4.4)
CO2 SERPL-SCNC: 21 MMOL/L (ref 20–29)
CREAT SERPL-MCNC: 0.81 MG/DL (ref 0.57–1)
EGFR: 83 ML/MIN/1.73
EST. AVERAGE GLUCOSE BLD GHB EST-MCNC: 146 MG/DL
GLOBULIN SER-MCNC: 2.6 G/DL (ref 1.5–4.5)
GLUCOSE SERPL-MCNC: 117 MG/DL (ref 70–99)
HBA1C MFR BLD: 6.7 % (ref 4.8–5.6)
HDLC SERPL-MCNC: 54 MG/DL
LDLC SERPL CALC-MCNC: 168 MG/DL (ref 0–99)
POTASSIUM SERPL-SCNC: 4.3 MMOL/L (ref 3.5–5.2)
PROT SERPL-MCNC: 7.1 G/DL (ref 6–8.5)
SL AMB VLDL CHOLESTEROL CALC: 36 MG/DL (ref 5–40)
SODIUM SERPL-SCNC: 141 MMOL/L (ref 134–144)
TRIGL SERPL-MCNC: 193 MG/DL (ref 0–149)

## 2023-06-19 ENCOUNTER — TELEPHONE (OUTPATIENT)
Dept: FAMILY MEDICINE CLINIC | Facility: CLINIC | Age: 63
End: 2023-06-19

## 2023-06-19 DIAGNOSIS — R73.09 ELEVATED HEMOGLOBIN A1C: Primary | ICD-10-CM

## 2023-06-19 DIAGNOSIS — R10.84 GENERALIZED ABDOMINAL PAIN: ICD-10-CM

## 2023-06-19 DIAGNOSIS — E03.9 HYPOTHYROIDISM, UNSPECIFIED TYPE: ICD-10-CM

## 2023-06-19 RX ORDER — LEVOTHYROXINE SODIUM 0.1 MG/1
100 TABLET ORAL DAILY
Qty: 90 TABLET | Refills: 1 | Status: SHIPPED | OUTPATIENT
Start: 2023-06-19

## 2023-06-19 RX ORDER — PANTOPRAZOLE SODIUM 40 MG/1
40 TABLET, DELAYED RELEASE ORAL DAILY
Qty: 90 TABLET | Refills: 0 | Status: SHIPPED | OUTPATIENT
Start: 2023-06-19

## 2023-06-19 NOTE — TELEPHONE ENCOUNTER
----- Message from [address] [place] [address],[address] sent at 6/19/2023  9:40 AM EDT -----  Cholesterol/Triglycerides improved  Continue healthy diet/exercise-decrease fats, sugars,carbs  HA1C is 6 7-this is diabetic range  Decrease sugars/carbs-repeat Ha1c in 3 months

## 2023-06-19 NOTE — TELEPHONE ENCOUNTER
Patient notify of results  Per patient she had lab screening for other matter  Labs where completed the beginning on June, per patient compare with our number they are way off  Patient will be uploading labs for provider to review and compare

## 2023-06-20 DIAGNOSIS — E78.2 MIXED HYPERLIPIDEMIA: ICD-10-CM

## 2023-06-20 RX ORDER — EZETIMIBE 10 MG/1
10 TABLET ORAL DAILY
Qty: 90 TABLET | Refills: 1 | Status: SHIPPED | OUTPATIENT
Start: 2023-06-20

## 2023-07-17 ENCOUNTER — HOSPITAL ENCOUNTER (OUTPATIENT)
Dept: SLEEP CENTER | Facility: HOSPITAL | Age: 63
Discharge: HOME | End: 2023-07-17
Attending: INTERNAL MEDICINE
Payer: COMMERCIAL

## 2023-07-17 DIAGNOSIS — G47.33 OBSTRUCTIVE SLEEP APNEA SYNDROME: ICD-10-CM

## 2023-07-17 PROCEDURE — G0399 HOME SLEEP TEST/TYPE 3 PORTA: HCPCS

## 2023-07-19 ENCOUNTER — TELEPHONE (OUTPATIENT)
Dept: SLEEP CENTER | Facility: CLINIC | Age: 63
End: 2023-07-19

## 2023-07-19 DIAGNOSIS — G47.34 SLEEP RELATED HYPOXIA: Primary | ICD-10-CM

## 2023-07-19 DIAGNOSIS — G47.33 OBSTRUCTIVE SLEEP APNEA SYNDROME: ICD-10-CM
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
## 2023-07-19 NOTE — PROGRESS NOTES
Home Sleep Study Documentation    HOME STUDY DEVICE: Noxturnal no                                           Carina G3 yes      Pre-Sleep Home Study:    Set-up and instructions performed by: Prema Layton performed demonstration for Patient: yes    Return demonstration performed by Patient: yes    Written instructions provided to Patient: yes    Patient signed consent form: yes        Post-Sleep Home Study:    Additional comments by Patient:     Home Sleep Study Failed:no:    Failure reason: N/A    Reported or Detected: N/A    Scored by: Osmar Bess

## 2023-08-03 ENCOUNTER — HOSPITAL ENCOUNTER (OUTPATIENT)
Dept: SLEEP CENTER | Facility: HOSPITAL | Age: 63
Discharge: HOME | End: 2023-08-03
Attending: INTERNAL MEDICINE
Payer: COMMERCIAL

## 2023-08-03 DIAGNOSIS — G47.34 SLEEP RELATED HYPOXIA: ICD-10-CM

## 2023-08-03 DIAGNOSIS — G47.33 OBSTRUCTIVE SLEEP APNEA SYNDROME: ICD-10-CM

## 2023-08-03 PROCEDURE — 95811 POLYSOM 6/>YRS CPAP 4/> PARM: CPT

## 2023-08-04 ENCOUNTER — TELEPHONE (OUTPATIENT)
Dept: SLEEP CENTER | Facility: CLINIC | Age: 63
End: 2023-08-04

## 2023-08-04 DIAGNOSIS — G47.34 SLEEP RELATED HYPOXIA: Primary | ICD-10-CM

## 2023-08-04 DIAGNOSIS — G47.33 OBSTRUCTIVE SLEEP APNEA SYNDROME: ICD-10-CM

## 2023-08-04 LAB

## 2023-08-04 NOTE — PROGRESS NOTES
Sleep Study Documentation    Pre-Sleep Study       Sleep testing procedure explained to patient:YES    Patient napped prior to study:NO    Caffeine:Dayshift worker after 12PM.  Caffeine use:NO    Alcohol:Dayshift workers after 5PM: Alcohol use:NO    Typical day for patient:YES       Study Documentation    Sleep Study Indications: MIRACLE-diagnosed home study    Sleep Study: Treatment   Optimal PAP pressure: 7 cm H2O  Leak:Small  Snore:Eliminated  REM Obtained:yes  Supplemental O2: no    Minimum SaO2 91%  Baseline SaO2 96%  PAP mask tried (list all) large ResMed AirFit N20  PAP mask choice (final) large ResMed AirFit N20  PAP mask type:nasal  PAP pressure at which snoring was eliminated 7 cm HO  Minimum SaO2 at final PAP pressure 89%  Mode of Therapy:CPAP  ETCO2:No  CPAP changed to BiPAP:No    Mode of Therapy:CPAP    EKG abnormalities: no     EEG abnormalities: no    Sleep Study Recorded < 2 hours: N/A    Sleep Study Recorded > 2 hours but incomplete study: N/A    Sleep Study Recorded 6 hours but no sleep obtained: NO    Patient classification: employed       Post-Sleep Study    Medication used at bedtime or during sleep study:NO    Patient reports time it took to fall asleep:greater than 60 minutes    Patient reports waking up during study:3 or more times. Patient reports returning to sleep in 10 to 30 minutes. Patient reports sleeping 2 to 4 hours without dreaming. Patient reports sleep during study:typical    Patient rated sleepiness: Somewhat sleepy or tired    PAP treatment:yes: Post PAP treatment patient reports feeling unchanged and would wear PAP mask at home.

## 2023-08-04 NOTE — TELEPHONE ENCOUNTER
Patient with moderate MIRACLE completed CPAP study and Dr. Timbo Ruelas ordered CPAP    Spoke to the patient scheduled DME set up and compliance appointments     RX and clinicals sent to [address]

## 2023-08-07 LAB

## 2023-08-22 LAB

## 2023-08-23 ENCOUNTER — TELEPHONE (OUTPATIENT)
Dept: SLEEP CENTER | Facility: CLINIC | Age: 63
End: 2023-08-23

## 2023-08-23 NOTE — TELEPHONE ENCOUNTER
8-22-23 per Demarco Graham:  Rosita Mitchell - Sales order [id number]  Cpap 7  Ramp 20 mins, starting at 4  EPR 2  Patient rcvd the N20 - med from sleep center.  She wanted to try a different nasal mask in store  Patient rcvd the Airfit N30i small frame - med cushion.  (6) cushions for N30i - med

## 2023-08-24 ENCOUNTER — TELEPHONE (OUTPATIENT)
Dept: SLEEP CENTER | Facility: CLINIC | Age: 63
End: 2023-08-24

## 2023-08-24 NOTE — TELEPHONE ENCOUNTER
Returned the patient's call she reports she met with higinio who told her to read the instructions for the machine. Patient had a lot of questions about how to use the machine, how to clean it how it is monitored. I answered multiple questions. Advised the patient to try using nasal saline prior to starting CPAP at night. Encouraged her to use the machine while watching TV to desensitize. Patient stated that her chest felt a bit full today. Advised that CPAP should not cause chest discomfort. Patient will call on Monday to update us on how she is doing with the CPAP.  If she continues to struggle nursing will obtain compliance for Dr. Eusebia Schmitt

## 2023-10-03 ENCOUNTER — OFFICE VISIT (OUTPATIENT)
Dept: SLEEP CENTER | Facility: HOSPITAL | Age: 63
End: 2023-10-03
Payer: COMMERCIAL

## 2023-10-03 VITALS
SYSTOLIC BLOOD PRESSURE: 110 MMHG | HEART RATE: 80 BPM | HEIGHT: 64 IN | WEIGHT: 224 LBS | DIASTOLIC BLOOD PRESSURE: 72 MMHG | BODY MASS INDEX: 38.24 KG/M2 | OXYGEN SATURATION: 97 % | RESPIRATION RATE: 17 BRPM

## 2023-10-03 DIAGNOSIS — F45.8 BRUXISM: ICD-10-CM

## 2023-10-03 DIAGNOSIS — E66.9 OBESITY (BMI 30-39.9): ICD-10-CM

## 2023-10-03 DIAGNOSIS — G47.9 SLEEP DISTURBANCE: ICD-10-CM

## 2023-10-03 DIAGNOSIS — G47.34 SLEEP RELATED HYPOXIA: ICD-10-CM

## 2023-10-03 DIAGNOSIS — R00.2 PALPITATIONS: ICD-10-CM

## 2023-10-03 DIAGNOSIS — K21.9 GASTROESOPHAGEAL REFLUX DISEASE, UNSPECIFIED WHETHER ESOPHAGITIS PRESENT: ICD-10-CM

## 2023-10-03 DIAGNOSIS — G47.33 OBSTRUCTIVE SLEEP APNEA SYNDROME: Primary | ICD-10-CM

## 2023-10-03 DIAGNOSIS — F51.12 INSUFFICIENT SLEEP SYNDROME: ICD-10-CM

## 2023-10-03 PROCEDURE — 99214 OFFICE O/P EST MOD 30 MIN: CPT | Performed by: INTERNAL MEDICINE

## 2023-10-03 NOTE — PROGRESS NOTES
Follow-Up Note - Sleep Center   Juanjo Mcclure  59 y.o. female  :1960  LVF:[id number]  DOS:10/3/2023    CC: I saw this patient for follow-up in clinic today for Sleep disordered breathing, Coexisting Sleep and Medical Problems. Interval changes: Treatment was initiated using a ResMed machine. Home sleep study was undertaken to evaluate for sleep disordered breathing, followed by a subsequent titration study. The patient is here to review results and to adjust therapy. HST demonstrated KARINA of 17.0. The lowest SpO2 recorded is 81% and 98.6 minutes during the study was spent with saturations below 90%. The snore index was 2.5%. During the subsequent therapeutic study, sleep disordered breathing that is successfully treated with positive airway pressure at an optimal setting of 7 cm H2O. She had sleep onset and sleep maintenance insomnia    PFSH, Problem List, Medications & Allergies were reviewed in EMR. She  has a past medical history of Anxiety, COVID-19, and Disease of thyroid gland. She has a current medication list which includes the following prescription(s): levothyroxine, pantoprazole, probiotic-10, cyanocobalamin, ezetimibe, and magnesium oxide. PHYSIOLOGICAL DATA REVIEW : Using PAP > 4 hours/night 100%. Estimated KARINA 1.1/hour with pressure of 7cm H2O ; patient has not been using non FDA approved devices to sanitize the machine. INTERPRETATION: Compliance is excellent; Pressure setting is:optimal; ;   SUBJECTIVE: With respect to use of PAP, Cabrera Schaeffer  is experiencing no adverse effects:. She derives benefit. Is satisfied with sleep and daytime function. Sleep Routine: Pascale reports getting 5 hrs sleep; she has no difficulty initiating or maintaining sleep . She arises spontaneously and usually feels refreshed. Cabrera Schaeffer reports episodic excessive daytime sleepiness, [feels like napping but does not have the opportunity.] She rated [herself] at Total score: 9 /24 on the [place] Sleepiness Scale. Other issues: Bruxism for which she uses invisible line. .     Habits:[ reports that she has never smoked. She has never used smokeless tobacco.], [ reports current alcohol use of about 2.0 standard drinks of alcohol per week.], [ reports no history of drug use.], Caffeine use:very little; Exercise routine: regular. ROS: Significant for weight fluctuates in the range of a few pounds. She reports less frequent palpitations. Acid reflux is controlled. Bill Smiley EXAM: /72   Pulse 80   Resp 17   Ht 5' 4" (1.626 m)   Wt 102 kg (224 lb)   SpO2 97%   BMI 38.45 kg/m²     Wt Readings from Last 3 Encounters:   10/03/23 102 kg (224 lb)   05/30/23 98 kg (216 lb)   10/19/22 102 kg (223 lb 12.8 oz)      Patient is well groomed; well appearing. CNS: Alert, orientated, speech clear & coherent  Psych: cooperative and in no distress. Mental state: Appears normal.  H&N: EOMI; NC/AT: No facial pressure marks, no rashes. Skin/Extrem: col & hydration normal; no edema  Resp: Respiratory effort is normal  Physical findings otherwise essentially unchanged from previous. IMPRESSION: Problem List Items & Comorbidities Addressed this Visit    1. Obstructive sleep apnea syndrome  PAP DME Resupply/Reorder      2. Sleep related hypoxia        3. Sleep disturbance        4. Bruxism        5. Insufficient sleep syndrome        6. Palpitations        7. Gastroesophageal reflux disease, unspecified whether esophagitis present        8. Obesity (BMI 30-39.9)        1-4 improved    PLAN:  1. I reviewed results of prior studies and physiologic data with the patient. 2. I discussed treatment options with risks and benefits. 3. Treatment with  PAP is medically necessary and Raffy Smith is agreable to continue use. 4. Care of equipment, methods to improve comfort using PAP and importance of compliance with therapy were discussed.   5. Pressure setting: continue 7 cmH2O.    6. Rx provided to replace supplies and Care coordinated with DME provider. 7. Discussed strategies for weight reduction. 8. I also advised allowing sufficient opportunity for sleep. 9. Follow-up is advised in 1 year or sooner if needed to monitor progress, compliance and to adjust therapy. Thank you for allowing me to participate in the care of this patient. Sincerely,     Authenticated electronically on 04/80/72   Board Certified Specialist     Portions of the record may have been created with voice recognition software. Occasional wrong word or "sound a like" substitutions may have occurred due to the inherent limitations of voice recognition software. There may also be notations and random deletions of words or characters from malfunctioning software. Read the chart carefully and recognize, using context, where substitutions/deletions have occurred.

## 2023-10-04 ENCOUNTER — TELEPHONE (OUTPATIENT)
Dept: SLEEP CENTER | Facility: CLINIC | Age: 63
End: 2023-10-04

## 2023-10-04 DIAGNOSIS — E03.9 HYPOTHYROIDISM, UNSPECIFIED TYPE: ICD-10-CM

## 2023-10-04 RX ORDER — LEVOTHYROXINE SODIUM 0.1 MG/1
100 TABLET ORAL DAILY
Qty: 90 TABLET | Refills: 1 | Status: SHIPPED | OUTPATIENT
Start: 2023-10-04

## 2023-10-04 NOTE — TELEPHONE ENCOUNTER
Pt would like this medication refilled and sent to [facility] in [address] [place] [address].

## 2023-10-06 LAB

## 2023-11-08 DIAGNOSIS — E03.9 HYPOTHYROIDISM, UNSPECIFIED TYPE: ICD-10-CM

## 2023-11-08 RX ORDER — LEVOTHYROXINE SODIUM 0.1 MG/1
100 TABLET ORAL DAILY
Qty: 90 TABLET | Refills: 0 | Status: SHIPPED | OUTPATIENT
Start: 2023-11-08

## 2023-11-24 ENCOUNTER — TELEPHONE (OUTPATIENT)
Dept: OTHER | Facility: OTHER | Age: 63
End: 2023-11-24

## 2023-11-24 NOTE — TELEPHONE ENCOUNTER
Patient is calling regarding cancelling an appointment.     Date/Time: 11/29/2023 1:00 pm    Patient was rescheduled: YES [] NO [x]    Patient requesting call back to reschedule: YES [] NO [x]

## 2024-01-31 ENCOUNTER — OFFICE VISIT (OUTPATIENT)
Dept: FAMILY MEDICINE CLINIC | Facility: CLINIC | Age: 64
End: 2024-01-31
Payer: COMMERCIAL

## 2024-01-31 VITALS
SYSTOLIC BLOOD PRESSURE: 120 MMHG | TEMPERATURE: 97.3 F | HEIGHT: 64 IN | BODY MASS INDEX: 37.05 KG/M2 | WEIGHT: 217 LBS | OXYGEN SATURATION: 99 % | HEART RATE: 74 BPM | DIASTOLIC BLOOD PRESSURE: 74 MMHG

## 2024-01-31 DIAGNOSIS — R10.2 PELVIC PAIN: Primary | ICD-10-CM

## 2024-01-31 DIAGNOSIS — E11.9 TYPE 2 DIABETES MELLITUS WITHOUT COMPLICATION, WITHOUT LONG-TERM CURRENT USE OF INSULIN (HCC): ICD-10-CM

## 2024-01-31 DIAGNOSIS — Z12.31 SCREENING MAMMOGRAM, ENCOUNTER FOR: ICD-10-CM

## 2024-01-31 DIAGNOSIS — Z00.00 ANNUAL PHYSICAL EXAM: ICD-10-CM

## 2024-01-31 DIAGNOSIS — E03.9 ACQUIRED HYPOTHYROIDISM: ICD-10-CM

## 2024-01-31 DIAGNOSIS — E78.00 HYPERCHOLESTEREMIA: ICD-10-CM

## 2024-01-31 LAB — SL AMB POCT HEMOGLOBIN AIC: 6.4 (ref ?–6.5)

## 2024-01-31 PROCEDURE — 83036 HEMOGLOBIN GLYCOSYLATED A1C: CPT | Performed by: NURSE PRACTITIONER

## 2024-01-31 PROCEDURE — 99396 PREV VISIT EST AGE 40-64: CPT | Performed by: NURSE PRACTITIONER

## 2024-01-31 PROCEDURE — 99214 OFFICE O/P EST MOD 30 MIN: CPT | Performed by: NURSE PRACTITIONER

## 2024-01-31 NOTE — PROGRESS NOTES
ADULT ANNUAL PHYSICAL  [facility]    NAME: Pascale Morgan  AGE: 63 y.o. SEX: female  : 1960     DATE: 2024     Assessment and Plan:     Problem List Items Addressed This Visit          Endocrine    Acquired hypothyroidism    Relevant Orders    TSH, 3rd generation with Free T4 reflex       Other    Hypercholesteremia     Other Visit Diagnoses       Pelvic pain    -  Primary    Relevant Orders    US abdomen and pelvis with transvaginal    Annual physical exam        Relevant Orders    Lipid panel    Comprehensive metabolic panel    Screening mammogram, encounter for        Relevant Orders    Mammo screening bilateral w 3d & cad    Type 2 diabetes mellitus without complication, without long-term current use of insulin (HCC)        Relevant Orders    POCT hemoglobin A1c (Completed)    Albumin / creatinine urine ratio            Immunizations and preventive care screenings were discussed with patient today. Appropriate education was printed on patient's after visit summary.    Counseling:  Alcohol/drug use: discussed moderation in alcohol intake, the recommendations for healthy alcohol use, and avoidance of illicit drug use.  Dental Health: discussed importance of regular tooth brushing, flossing, and dental visits.  Injury prevention: discussed safety/seat belts, safety helmets, smoke detectors, carbon dioxide detectors, and smoking near bedding or upholstery.  Sexual health: discussed sexually transmitted diseases, partner selection, use of condoms, avoidance of unintended pregnancy, and contraceptive alternatives.  Exercise: the importance of regular exercise/physical activity was discussed. Recommend exercise 3-5 times per week for at least 30 minutes.       Depression Screening and Follow-up Plan: Patient was screened for depression during today's encounter. They screened negative with a PHQ-2 score of 0.        Return in about 6 months  (around 2024) for Recheck.     Chief Complaint:     Chief Complaint   Patient presents with    Physical Exam      History of Present Illness:   Here for annual physical and medical follow up.  C/o pelvic pain and abdominal pain-increased bloating-pain is sharp and intermittent- HX of gastroparesis. she has a HX of CA in family sister has uterine and Father had pancreatic cancer.  Patient is Type 2 diabetic-Hba1c today is 6.4. Patient has been diet controlled-Pt not ready to start medication at this time will continue diet and exercise.  Hx of hyperlipidemia-due fasting labs-she stopped taking Zetia due to side effects-she states she has also tried statins and did not tolerate.  Hx of hypothyroidism-last TSH-was within normal range. Due TSH. Currently taking levothyroxine 100 mcg daily.        Adult Annual Physical   Patient here for a comprehensive physical exam. The patient reports problems - multiple issues stomach issues,- HX of gastroparesis, right arm pain, would also like to have a pap in the near future to check as she has a HX of CA in family sister has uterine and Father had pancreatic cancer .    Diet and Physical Activity  Diet/Nutrition: frequent junk food.   Exercise: walking.      Depression Screening  PHQ-2/9 Depression Screening    Little interest or pleasure in doing things: 0 - not at all  Feeling down, depressed, or hopeless: 0 - not at all  Trouble falling or staying asleep, or sleeping too much: 0 - not at all  Feeling tired or having little energy: 0 - not at all  Poor appetite or overeatin - not at all  Feeling bad about yourself - or that you are a failure or have let yourself or your family down: 0 - not at all  Trouble concentrating on things, such as reading the newspaper or watching television: 0 - not at all  Moving or speaking so slowly that other people could have noticed. Or the opposite - being so fidgety or restless that you have been moving around a lot more than usual: 0  - not at all  Thoughts that you would be better off dead, or of hurting yourself in some way: 0 - not at all  PHQ-2 Score: 0  PHQ-2 Interpretation: Negative depression screen  PHQ-9 Score: 0  PHQ-9 Interpretation: No or Minimal depression       General Health  Sleep: gets 4-6 hours of sleep on average.   Hearing: normal - bilateral.  Vision: goes for regular eye exams and wears glasses.   Dental: regular dental visits and brushes teeth twice daily.       /GYN Health  Follows with gynecology? no   Patient is: postmenopausal        Advanced Care Planning  Do you have an advanced directive? no  Do you have a durable medical power of ? no     Review of Systems:     Review of Systems   Constitutional:  Negative for activity change, diaphoresis, fatigue and fever.   HENT:  Negative for congestion, facial swelling, hearing loss, rhinorrhea, sinus pressure, sinus pain, sneezing, sore throat and voice change.    Eyes:  Negative for discharge and visual disturbance.   Respiratory:  Negative for cough, choking, chest tightness, shortness of breath, wheezing and stridor.    Cardiovascular:  Negative for chest pain, palpitations and leg swelling.   Gastrointestinal:  Positive for abdominal pain. Negative for abdominal distention, constipation, diarrhea, nausea and vomiting.   Endocrine: Negative for polydipsia, polyphagia and polyuria.   Genitourinary:  Positive for pelvic pain (occasional shooting pain) and vaginal pain (occasional). Negative for difficulty urinating, dysuria, frequency and urgency.   Musculoskeletal: Negative.  Negative for arthralgias, back pain, gait problem, joint swelling, myalgias, neck pain and neck stiffness.   Skin:  Negative for color change, rash and wound.   Neurological:  Negative for dizziness, syncope, speech difficulty, weakness, light-headedness and headaches.   Hematological:  Negative for adenopathy. Does not bruise/bleed easily.   Psychiatric/Behavioral:  Negative for agitation,  "behavioral problems, confusion, hallucinations, sleep disturbance and suicidal ideas. The patient is not nervous/anxious.       Past Medical History:     Past Medical History:   Diagnosis Date    Anxiety     COVID-19     in march    Disease of thyroid gland       Past Surgical History:     Past Surgical History:   Procedure Laterality Date    BREAST BIOPSY Right     benign    FRACTURE SURGERY Right     arm    WISDOM TOOTH EXTRACTION        Social History:     Social History     Socioeconomic History    Marital status: /Civil Union     Spouse name: Gulshan \"Paras\"    Number of children: 0    Years of education: None    Highest education level: None   Occupational History    Occupation:    Tobacco Use    Smoking status: Never    Smokeless tobacco: Never   Vaping Use    Vaping status: Never Used   Substance and Sexual Activity    Alcohol use: Yes     Alcohol/week: 2.0 standard drinks of alcohol     Types: 2 Glasses of wine per week    Drug use: No    Sexual activity: None   Other Topics Concern    None   Social History Narrative    None     Social Determinants of Health     Financial Resource Strain: Not on file   Food Insecurity: Not on file   Transportation Needs: Not on file   Physical Activity: Not on file   Stress: Not on file   Social Connections: Not on file   Intimate Partner Violence: Not on file   Housing Stability: Not on file      Family History:     Family History   Problem Relation Age of Onset    Dementia Mother     Heart failure Mother     Diabetes Mother     Cancer Father     Pancreatic cancer Father     Uterine cancer Sister 62    Dementia Sister     No Known Problems Sister     No Known Problems Sister     No Known Problems Maternal Grandmother     No Known Problems Maternal Grandfather     No Known Problems Paternal Grandmother     No Known Problems Paternal Grandfather     Colon cancer Neg Hx     Colon polyps Neg Hx       Current Medications:     Current Outpatient Medications " "  Medication Sig Dispense Refill    levothyroxine 100 mcg tablet Take 1 tablet (100 mcg total) by mouth daily 90 tablet 0    pantoprazole (PROTONIX) 40 mg tablet Take 1 tablet (40 mg total) by mouth daily 90 tablet 0    Probiotic Product (PROBIOTIC-10) CAPS Take 1 tablet by mouth daily         No current facility-administered medications for this visit.      Allergies:     Allergies   Allergen Reactions    Sulfa Antibiotics Anaphylaxis    Statins Hives      Physical Exam:     /74 (BP Location: Left arm, Patient Position: Sitting, Cuff Size: Standard)   Pulse 74   Temp (!) 97.3 °F (36.3 °C) (Tympanic)   Ht 5' 4\" (1.626 m)   Wt 98.4 kg (217 lb)   SpO2 99%   BMI 37.25 kg/m²     Physical Exam  Vitals and nursing note reviewed.   Constitutional:       General: She is not in acute distress.     Appearance: She is obese. She is not ill-appearing, toxic-appearing or diaphoretic.   HENT:      Head: Normocephalic and atraumatic.      Right Ear: Tympanic membrane normal. There is no impacted cerumen.      Left Ear: Tympanic membrane normal. There is no impacted cerumen.      Nose: Nose normal. No congestion or rhinorrhea.      Mouth/Throat:      Mouth: Mucous membranes are moist.      Pharynx: Oropharynx is clear. No oropharyngeal exudate or posterior oropharyngeal erythema.   Eyes:      General:         Right eye: No discharge.         Left eye: No discharge.      Extraocular Movements: Extraocular movements intact.      Conjunctiva/sclera: Conjunctivae normal.      Pupils: Pupils are equal, round, and reactive to light.   Neck:      Vascular: No carotid bruit.   Cardiovascular:      Rate and Rhythm: Normal rate and regular rhythm.      Pulses: no weak pulses          Dorsalis pedis pulses are 2+ on the right side and 2+ on the left side.        Posterior tibial pulses are 2+ on the right side and 2+ on the left side.      Heart sounds: No murmur heard.     No friction rub. No gallop.   Pulmonary:      Effort: " Pulmonary effort is normal. No respiratory distress.      Breath sounds: Normal breath sounds. No stridor. No wheezing, rhonchi or rales.   Chest:      Chest wall: No tenderness.   Abdominal:      General: Bowel sounds are normal. There is no distension.      Palpations: Abdomen is soft. There is no mass.      Tenderness: There is abdominal tenderness (shooting). There is no guarding or rebound.      Hernia: No hernia is present.   Genitourinary:     Comments: Deferred    Musculoskeletal:         General: No swelling, tenderness, deformity or signs of injury.      Cervical back: Normal range of motion. No rigidity or tenderness.      Right lower leg: No edema.      Left lower leg: No edema.   Feet:      Right foot:      Skin integrity: Dry skin present. No ulcer, skin breakdown, erythema, warmth or callus.      Left foot:      Skin integrity: Dry skin present. No ulcer, skin breakdown, erythema, warmth or callus.   Lymphadenopathy:      Cervical: No cervical adenopathy.   Skin:     General: Skin is warm.      Capillary Refill: Capillary refill takes less than 2 seconds.      Coloration: Skin is not jaundiced or pale.      Findings: No bruising, erythema, lesion or rash.   Neurological:      General: No focal deficit present.      Mental Status: She is alert and oriented to person, place, and time. Mental status is at baseline.      Cranial Nerves: No cranial nerve deficit.      Sensory: No sensory deficit.      Motor: No weakness.      Coordination: Coordination normal.      Gait: Gait normal.      Deep Tendon Reflexes: Reflexes normal.   Psychiatric:         Mood and Affect: Mood normal.         Behavior: Behavior normal.         Thought Content: Thought content normal.         Judgment: Judgment normal.     Diabetic Foot Exam    Patient's shoes and socks removed.    Right Foot/Ankle   Right Foot Inspection  Skin Exam: skin normal, skin intact and dry skin. No warmth, no callus, no erythema, no maceration, no  abnormal color, no pre-ulcer, no ulcer and no callus.     Toe Exam: ROM and strength within normal limits. No swelling, no tenderness, erythema and  no right toe deformity    Sensory   Vibration: intact  Proprioception: intact  Monofilament testing: intact    Vascular  Capillary refills: < 3 seconds  The right DP pulse is 2+. The right PT pulse is 2+.     Left Foot/Ankle  Left Foot Inspection  Skin Exam: skin normal, skin intact and dry skin. No warmth, no erythema, no maceration, normal color, no pre-ulcer, no ulcer and no callus.     Toe Exam: ROM and strength within normal limits. No swelling, no tenderness, no erythema and no left toe deformity.     Sensory   Vibration: intact  Proprioception: intact  Monofilament testing: intact    Vascular  Capillary refills: < 3 seconds  The left DP pulse is 2+. The left PT pulse is 2+.     Assign Risk Category  No deformity present  No loss of protective sensation  No weak pulses  Risk: 0       BRIDGER Jacob  [facility]

## 2024-01-31 NOTE — PATIENT INSTRUCTIONS
Continue Levothyroxine 100 mcg daily. Have TSH lab done.  Fasting Labs as ordered  Continue diet and exercise as patient not ready to start oral DM medications at this timeand not tolerating cholesterol medications.   Decrease carbs, sugars, fats in diet.  Attempt elimination diet (Dairy, gluten, soy) for ongoing stomach issues.  Ultrasound as ordered for pelvic and abdominal pain  Return in 6 months or sooner if needed for diabetes recheck and medical manangment        Wellness Visit for Adults   AMBULATORY CARE:   A wellness visit  is when you see your healthcare provider to get screened for health problems. Your healthcare provider will also give you advice on how to stay healthy. Write down your questions so you remember to ask them. Ask your healthcare provider how often you should have a wellness visit.  What happens at a wellness visit:  Your healthcare provider will ask about your health, and your family history of health problems. This includes high blood pressure, heart disease, and cancer. He or she will ask if you have symptoms that concern you, if you smoke, and about your mood. You may also be asked about your intake of medicines, supplements, food, and alcohol. Any of the following may be done:  Your weight  will be checked. Your height may also be checked so your body mass index (BMI) can be calculated. Your BMI shows if you are at a healthy weight.    Your blood pressure  and heart rate will be checked. Your temperature may also be checked.    Blood and urine tests  may be done. Blood tests may be done to check your cholesterol levels. Abnormal cholesterol levels increase your risk for heart disease and stroke. You may also need a blood or urine test to check for diabetes if you are at increased risk. Urine tests may be done to look for signs of an infection or kidney disease.    A physical exam  includes checking your heartbeat and lungs with a stethoscope. Your healthcare provider may also check  your skin to look for sun damage.    Screening tests  may be recommended. A screening test is done to check for diseases that may not cause symptoms. The screening tests you may need depend on your age, gender, family history, and lifestyle habits. For example, colorectal screening may be recommended if you are 50 years old or older.    Screening tests you need if you are a woman:   A Pap smear  is used to screen for cervical cancer. Pap smears are usually done every 3 to 5 years depending on your age. You may need them more often if you have had abnormal Pap smear test results in the past. Ask your healthcare provider how often you should have a Pap smear.    A mammogram  is an x-ray of your breasts to screen for breast cancer. Experts recommend mammograms every 2 years starting at age 50 years. You may need a mammogram at age 49 years or younger if you have an increased risk for breast cancer. Talk to your healthcare provider about when you should start having mammograms and how often you need them.    Vaccines you may need:   Get an influenza vaccine  every year. The influenza vaccine protects you from the flu. Several types of viruses cause the flu. The viruses change over time, so new vaccines are made each year.    Get a tetanus-diphtheria (Td) booster vaccine  every 10 years. This vaccine protects you against tetanus and diphtheria. Tetanus is a severe infection that may cause painful muscle spasms and lockjaw. Diphtheria is a severe bacterial infection that causes a thick covering in the back of your mouth and throat.    Get a human papillomavirus (HPV) vaccine  if you are female and aged 19 to 26 or male 19 to 21 and never received it. This vaccine protects you from HPV infection. HPV is the most common infection spread by sexual contact. HPV may also cause vaginal, penile, and anal cancers.    Get a pneumococcal vaccine  if you are aged 65 years or older. The pneumococcal vaccine is an injection given to  protect you from pneumococcal disease. Pneumococcal disease is an infection caused by pneumococcal bacteria. The infection may cause pneumonia, meningitis, or an ear infection.    Get a shingles vaccine  if you are 60 or older, even if you have had shingles before. The shingles vaccine is an injection to protect you from the varicella-zoster virus. This is the same virus that causes chickenpox. Shingles is a painful rash that develops in people who had chickenpox or have been exposed to the virus.    How to eat healthy:  My Plate is a model for planning healthy meals. It shows the types and amounts of foods that should go on your plate. Fruits and vegetables make up about half of your plate, and grains and protein make up the other half. A serving of dairy is included on the side of your plate. The amount of calories and serving sizes you need depends on your age, gender, weight, and height. Examples of healthy foods are listed below:  Eat a variety of vegetables  such as dark green, red, and orange vegetables. You can also include canned vegetables low in sodium (salt) and frozen vegetables without added butter or sauces.    Eat a variety of fresh fruits , canned fruit in 100% juice, frozen fruit, and dried fruit.    Include whole grains.  At least half of the grains you eat should be whole grains. Examples include whole-wheat bread, wheat pasta, brown rice, and whole-grain cereals such as oatmeal.    Eat a variety of protein foods such as seafood (fish and shellfish), lean meat, and poultry without skin (turkey and chicken). Examples of lean meats include pork leg, shoulder, or tenderloin, and beef round, sirloin, tenderloin, and extra lean ground beef. Other protein foods include eggs and egg substitutes, beans, peas, soy products, nuts, and seeds.    Choose low-fat dairy products such as skim or 1% milk or low-fat yogurt, cheese, and cottage cheese.    Limit unhealthy fats  such as butter, hard margarine, and  shortening.       Exercise:  Exercise at least 30 minutes per day on most days of the week. Some examples of exercise include walking, biking, dancing, and swimming. You can also fit in more physical activity by taking the stairs instead of the elevator or parking farther away from stores. Include muscle strengthening activities 2 days each week. Regular exercise provides many health benefits. It helps you manage your weight, and decreases your risk for type 2 diabetes, heart disease, stroke, and high blood pressure. Exercise can also help improve your mood. Ask your healthcare provider about the best exercise plan for you.       General health and safety guidelines:   Do not smoke.  Nicotine and other chemicals in cigarettes and cigars can cause lung damage. Ask your healthcare provider for information if you currently smoke and need help to quit. E-cigarettes or smokeless tobacco still contain nicotine. Talk to your healthcare provider before you use these products.    Limit alcohol.  A drink of alcohol is 12 ounces of beer, 5 ounces of wine, or 1½ ounces of liquor.    Lose weight, if needed.  Being overweight increases your risk of certain health conditions. These include heart disease, high blood pressure, type 2 diabetes, and certain types of cancer.    Protect your skin.  Do not sunbathe or use tanning beds. Use sunscreen with a SPF 15 or higher. Apply sunscreen at least 15 minutes before you go outside. Reapply sunscreen every 2 hours. Wear protective clothing, hats, and sunglasses when you are outside.    Drive safely.  Always wear your seatbelt. Make sure everyone in your car wears a seatbelt. A seatbelt can save your life if you are in an accident. Do not use your cell phone when you are driving. This could distract you and cause an accident. Pull over if you need to make a call or send a text message.    Practice safe sex.  Use latex condoms if are sexually active and have more than one partner. Your  healthcare provider may recommend screening tests for sexually transmitted infections (STIs).    Wear helmets, lifejackets, and protective gear.  Always wear a helmet when you ride a bike or motorcycle, go skiing, or play sports that could cause a head injury. Wear protective equipment when you play sports. Wear a lifejacket when you are on a boat or doing water sports.    © Copyright [employer] 2023 Information is for End User's use only and may not be sold, redistributed or otherwise used for commercial purposes.  The above information is an  only. It is not intended as medical advice for individual conditions or treatments. Talk to your doctor, nurse or pharmacist before following any medical regimen to see if it is safe and effective for you.

## 2024-02-01 LAB
ALBUMIN/CREAT UR: 8 MG/G CREAT (ref 0–29)
CREAT UR-MCNC: 153.2 MG/DL
MICROALBUMIN UR-MCNC: 12.6 UG/ML

## 2024-02-02 ENCOUNTER — TELEPHONE (OUTPATIENT)
Dept: OTHER | Facility: OTHER | Age: 64
End: 2024-02-02

## 2024-02-02 NOTE — TELEPHONE ENCOUNTER
Patient is requesting to have additional lab orders to check for cancer added to current lab orders.

## 2024-02-05 DIAGNOSIS — Z13.0 ENCOUNTER FOR SCREENING FOR HEMATOLOGIC DISORDER: Primary | ICD-10-CM

## 2024-02-05 NOTE — TELEPHONE ENCOUNTER
I added cbc to her labs. For specific cancer screening/labs she will need genetic testing. She can call [phone] for information.

## 2024-02-07 ENCOUNTER — TELEPHONE (OUTPATIENT)
Dept: FAMILY MEDICINE CLINIC | Facility: CLINIC | Age: 64
End: 2024-02-07

## 2024-02-07 NOTE — TELEPHONE ENCOUNTER
Patient called in stating she was in on 1/31/2024 for abdominal pain and Mikala ordered a US. Patient is scheduled for this Sunday, but yesterday she went to the bathroom and there was blood in her stool. Patient would like to know how she should go about and if she should go to [facility] and have her stool tested because she's very concerned she states. She still has the stool in her toilet and isn't flushing it until she hears back from us so she can take the stool to the lab. She went this morning and there was no blood in her stool, just last night.

## 2024-02-07 NOTE — TELEPHONE ENCOUNTER
Patient is aware. She would like to know why Mikala wants her to see GI instead of having her stool tested at [facility]. States she doesn't want to go from doctor to doctor if she can have her stool tested at the lab.

## 2024-02-08 LAB
ERYTHROCYTE [DISTWIDTH] IN BLOOD BY AUTOMATED COUNT: 13 % (ref 11.7–15.4)
HCT VFR BLD AUTO: 41.8 % (ref 34–46.6)
HGB BLD-MCNC: 14.1 G/DL (ref 11.1–15.9)
MCH RBC QN AUTO: 29.7 PG (ref 26.6–33)
MCHC RBC AUTO-ENTMCNC: 33.7 G/DL (ref 31.5–35.7)
MCV RBC AUTO: 88 FL (ref 79–97)
PLATELET # BLD AUTO: 324 X10E3/UL (ref 150–450)
RBC # BLD AUTO: 4.74 X10E6/UL (ref 3.77–5.28)
WBC # BLD AUTO: 5.4 X10E3/UL (ref 3.4–10.8)

## 2024-02-09 ENCOUNTER — TELEPHONE (OUTPATIENT)
Dept: FAMILY MEDICINE CLINIC | Facility: CLINIC | Age: 64
End: 2024-02-09

## 2024-02-10 ENCOUNTER — APPOINTMENT (EMERGENCY)
Dept: CT IMAGING | Facility: HOSPITAL | Age: 64
End: 2024-02-10
Payer: COMMERCIAL

## 2024-02-10 ENCOUNTER — HOSPITAL ENCOUNTER (EMERGENCY)
Facility: HOSPITAL | Age: 64
Discharge: HOME | End: 2024-02-10
Attending: EMERGENCY MEDICINE | Admitting: EMERGENCY MEDICINE
Payer: COMMERCIAL

## 2024-02-10 VITALS
OXYGEN SATURATION: 98 % | SYSTOLIC BLOOD PRESSURE: 134 MMHG | WEIGHT: 200 LBS | TEMPERATURE: 98.5 F | RESPIRATION RATE: 20 BRPM | DIASTOLIC BLOOD PRESSURE: 74 MMHG | HEIGHT: 63 IN | BODY MASS INDEX: 35.44 KG/M2 | HEART RATE: 78 BPM

## 2024-02-10 DIAGNOSIS — K76.0 HEPATIC STEATOSIS: ICD-10-CM

## 2024-02-10 DIAGNOSIS — R10.9 ABDOMINAL PAIN: Primary | ICD-10-CM

## 2024-02-10 DIAGNOSIS — K76.9 LIVER LESION: ICD-10-CM

## 2024-02-10 DIAGNOSIS — R14.0 ABDOMINAL BLOATING: ICD-10-CM

## 2024-02-10 DIAGNOSIS — R07.89 CHEST TIGHTNESS: ICD-10-CM

## 2024-02-10 LAB
ALBUMIN SERPL BCP-MCNC: 4.7 G/DL (ref 3.5–5)
ALBUMIN SERPL-MCNC: 4.3 G/DL (ref 3.9–4.9)
ALBUMIN/GLOB SERPL: 1.7 {RATIO} (ref 1.2–2.2)
ALP SERPL-CCNC: 78 U/L (ref 34–104)
ALP SERPL-CCNC: 83 IU/L (ref 44–121)
ALT SERPL W P-5'-P-CCNC: 42 U/L (ref 7–52)
ALT SERPL-CCNC: 43 IU/L (ref 0–32)
ANION GAP SERPL CALCULATED.3IONS-SCNC: 8 MMOL/L
AST SERPL W P-5'-P-CCNC: 39 U/L (ref 13–39)
AST SERPL-CCNC: 46 IU/L (ref 0–40)
ATRIAL RATE: 65 BPM
BASOPHILS # BLD AUTO: 0.03 THOUSANDS/ÂΜL (ref 0–0.1)
BASOPHILS NFR BLD AUTO: 1 % (ref 0–1)
BILIRUB SERPL-MCNC: 0.3 MG/DL (ref 0–1.2)
BILIRUB SERPL-MCNC: 0.72 MG/DL (ref 0.2–1)
BILIRUB UR QL STRIP: NEGATIVE
BUN SERPL-MCNC: 10 MG/DL (ref 5–25)
BUN SERPL-MCNC: 12 MG/DL (ref 8–27)
BUN/CREAT SERPL: 15 (ref 12–28)
CALCIUM SERPL-MCNC: 10.4 MG/DL (ref 8.4–10.2)
CALCIUM SERPL-MCNC: 9.3 MG/DL (ref 8.7–10.3)
CARDIAC TROPONIN I PNL SERPL HS: <2 NG/L
CHLORIDE SERPL-SCNC: 103 MMOL/L (ref 96–106)
CHLORIDE SERPL-SCNC: 106 MMOL/L (ref 96–108)
CHOLEST SERPL-MCNC: 224 MG/DL (ref 100–199)
CHOLEST/HDLC SERPL: 5.5 RATIO (ref 0–4.4)
CLARITY UR: CLEAR
CO2 SERPL-SCNC: 23 MMOL/L (ref 20–29)
CO2 SERPL-SCNC: 26 MMOL/L (ref 21–32)
COLOR UR: YELLOW
CREAT SERPL-MCNC: 0.78 MG/DL (ref 0.57–1)
CREAT SERPL-MCNC: 0.82 MG/DL (ref 0.6–1.3)
EGFR: 86 ML/MIN/1.73
EOSINOPHIL # BLD AUTO: 0.06 THOUSAND/ÂΜL (ref 0–0.61)
EOSINOPHIL NFR BLD AUTO: 1 % (ref 0–6)
ERYTHROCYTE [DISTWIDTH] IN BLOOD BY AUTOMATED COUNT: 13.5 % (ref 11.6–15.1)
EST. AVERAGE GLUCOSE BLD GHB EST-MCNC: 148 MG/DL
GFR SERPL CREATININE-BSD FRML MDRD: 76 ML/MIN/1.73SQ M
GLOBULIN SER-MCNC: 2.5 G/DL (ref 1.5–4.5)
GLUCOSE SERPL-MCNC: 101 MG/DL (ref 65–140)
GLUCOSE SERPL-MCNC: 118 MG/DL (ref 70–99)
GLUCOSE UR STRIP-MCNC: NEGATIVE MG/DL
HBA1C MFR BLD: 6.8 % (ref 4.8–5.6)
HCT VFR BLD AUTO: 42.3 % (ref 34.8–46.1)
HDLC SERPL-MCNC: 41 MG/DL
HGB BLD-MCNC: 13.9 G/DL (ref 11.5–15.4)
HGB UR QL STRIP.AUTO: NEGATIVE
IMM GRANULOCYTES # BLD AUTO: 0.01 THOUSAND/UL (ref 0–0.2)
IMM GRANULOCYTES NFR BLD AUTO: 0 % (ref 0–2)
KETONES UR STRIP-MCNC: ABNORMAL MG/DL
LDLC SERPL CALC-MCNC: 153 MG/DL (ref 0–99)
LEUKOCYTE ESTERASE UR QL STRIP: NEGATIVE
LIPASE SERPL-CCNC: 31 U/L (ref 11–82)
LYMPHOCYTES # BLD AUTO: 3.15 THOUSANDS/ÂΜL (ref 0.6–4.47)
LYMPHOCYTES NFR BLD AUTO: 48 % (ref 14–44)
MAGNESIUM SERPL-MCNC: 2.2 MG/DL (ref 1.9–2.7)
MCH RBC QN AUTO: 29.9 PG (ref 26.8–34.3)
MCHC RBC AUTO-ENTMCNC: 32.9 G/DL (ref 31.4–37.4)
MCV RBC AUTO: 91 FL (ref 82–98)
MONOCYTES # BLD AUTO: 0.59 THOUSAND/ÂΜL (ref 0.17–1.22)
MONOCYTES NFR BLD AUTO: 9 % (ref 4–12)
NEUTROPHILS # BLD AUTO: 2.71 THOUSANDS/ÂΜL (ref 1.85–7.62)
NEUTS SEG NFR BLD AUTO: 41 % (ref 43–75)
NITRITE UR QL STRIP: NEGATIVE
NRBC BLD AUTO-RTO: 0 /100 WBCS
P AXIS: 52 DEGREES
PH UR STRIP.AUTO: 6.5 [PH]
PLATELET # BLD AUTO: 288 THOUSANDS/UL (ref 149–390)
PMV BLD AUTO: 10.1 FL (ref 8.9–12.7)
POTASSIUM SERPL-SCNC: 3.9 MMOL/L (ref 3.5–5.3)
POTASSIUM SERPL-SCNC: 4.3 MMOL/L (ref 3.5–5.2)
PR INTERVAL: 170 MS
PROT SERPL-MCNC: 6.8 G/DL (ref 6–8.5)
PROT SERPL-MCNC: 7.5 G/DL (ref 6.4–8.4)
PROT UR STRIP-MCNC: NEGATIVE MG/DL
QRS AXIS: 86 DEGREES
QRSD INTERVAL: 98 MS
QT INTERVAL: 416 MS
QTC INTERVAL: 432 MS
RBC # BLD AUTO: 4.65 MILLION/UL (ref 3.81–5.12)
SL AMB T4, FREE (DIRECT): 1.26 NG/DL (ref 0.82–1.77)
SL AMB VLDL CHOLESTEROL CALC: 30 MG/DL (ref 5–40)
SODIUM SERPL-SCNC: 140 MMOL/L (ref 135–147)
SODIUM SERPL-SCNC: 141 MMOL/L (ref 134–144)
SP GR UR STRIP.AUTO: <1.005 (ref 1–1.03)
T WAVE AXIS: 32 DEGREES
TRIGL SERPL-MCNC: 162 MG/DL (ref 0–149)
TSH SERPL DL<=0.005 MIU/L-ACNC: 5.14 UIU/ML (ref 0.45–4.5)
TSH SERPL DL<=0.05 MIU/L-ACNC: 2.67 UIU/ML (ref 0.45–4.5)
UROBILINOGEN UR STRIP-ACNC: <2 MG/DL
VENTRICULAR RATE: 65 BPM
WBC # BLD AUTO: 6.55 THOUSAND/UL (ref 4.31–10.16)

## 2024-02-10 PROCEDURE — 99284 EMERGENCY DEPT VISIT MOD MDM: CPT

## 2024-02-10 PROCEDURE — 84443 ASSAY THYROID STIM HORMONE: CPT | Performed by: PHYSICIAN ASSISTANT

## 2024-02-10 PROCEDURE — 74174 CTA ABD&PLVS W/CONTRAST: CPT

## 2024-02-10 PROCEDURE — 83735 ASSAY OF MAGNESIUM: CPT | Performed by: PHYSICIAN ASSISTANT

## 2024-02-10 PROCEDURE — 93005 ELECTROCARDIOGRAM TRACING: CPT

## 2024-02-10 PROCEDURE — 81003 URINALYSIS AUTO W/O SCOPE: CPT | Performed by: PHYSICIAN ASSISTANT

## 2024-02-10 PROCEDURE — 85025 COMPLETE CBC W/AUTO DIFF WBC: CPT | Performed by: PHYSICIAN ASSISTANT

## 2024-02-10 PROCEDURE — 83690 ASSAY OF LIPASE: CPT | Performed by: PHYSICIAN ASSISTANT

## 2024-02-10 PROCEDURE — 96374 THER/PROPH/DIAG INJ IV PUSH: CPT

## 2024-02-10 PROCEDURE — 71275 CT ANGIOGRAPHY CHEST: CPT

## 2024-02-10 PROCEDURE — 36415 COLL VENOUS BLD VENIPUNCTURE: CPT | Performed by: PHYSICIAN ASSISTANT

## 2024-02-10 PROCEDURE — 84484 ASSAY OF TROPONIN QUANT: CPT | Performed by: PHYSICIAN ASSISTANT

## 2024-02-10 PROCEDURE — 80053 COMPREHEN METABOLIC PANEL: CPT | Performed by: PHYSICIAN ASSISTANT

## 2024-02-10 PROCEDURE — 96361 HYDRATE IV INFUSION ADD-ON: CPT

## 2024-02-10 PROCEDURE — 99285 EMERGENCY DEPT VISIT HI MDM: CPT | Performed by: PHYSICIAN ASSISTANT

## 2024-02-10 RX ORDER — KETOROLAC TROMETHAMINE 30 MG/ML
15 INJECTION, SOLUTION INTRAMUSCULAR; INTRAVENOUS ONCE
Status: COMPLETED | OUTPATIENT
Start: 2024-02-10 | End: 2024-02-10

## 2024-02-10 RX ADMIN — IOHEXOL 100 ML: 350 INJECTION, SOLUTION INTRAVENOUS at 20:27

## 2024-02-10 RX ADMIN — SODIUM CHLORIDE 1000 ML: 0.9 INJECTION, SOLUTION INTRAVENOUS at 19:48

## 2024-02-10 RX ADMIN — KETOROLAC TROMETHAMINE 15 MG: 30 INJECTION, SOLUTION INTRAMUSCULAR; INTRAVENOUS at 19:48

## 2024-02-11 ENCOUNTER — HOSPITAL ENCOUNTER (OUTPATIENT)
Dept: ULTRASOUND IMAGING | Facility: HOSPITAL | Age: 64
Discharge: HOME | End: 2024-02-11
Payer: COMMERCIAL

## 2024-02-11 DIAGNOSIS — R10.2 PELVIC PAIN: ICD-10-CM

## 2024-02-11 DIAGNOSIS — R10.84 GENERALIZED ABDOMINAL PAIN: ICD-10-CM

## 2024-02-11 PROCEDURE — 76856 US EXAM PELVIC COMPLETE: CPT

## 2024-02-11 PROCEDURE — 76700 US EXAM ABDOM COMPLETE: CPT

## 2024-02-11 PROCEDURE — 76830 TRANSVAGINAL US NON-OB: CPT

## 2024-02-11 NOTE — ED PROVIDER NOTES
"History  Chief Complaint   Patient presents with    Abdominal Pain     Pt states \"I feel bloated and my stomach hurts. It's been going on for 3 weeks\" Denies n,v,d, or fevers. Also, it feels \"heavy down there. It's not pain or burning like a UTI. It just feels heavy\".      Patient is a 63-year-old female with a PMHx of anxiety and hypothyroidism, presenting to the ED for evaluation of abdominal pain and bloating x 3 weeks. Patient states that she has had intermittent generalized abdominal pain and bloating for the past few weeks. She reports early satiety and states that her abdomen seems to become distended and feels \"heavy\" after eating only a few bites of food.  She admits to occasional nausea but no vomiting.  She also reports that her stools are looser than normal but not quite diarrhea.  She denies any melena or hematochezia.  She denies any fevers, chills or urinary symptoms.  She also endorses intermittent chest pain that occurs at random. She also states that when she swallows her food she sometimes experiences a \"fluttering\" sensation in her chest and then feels that her right arm becomes heavy briefly. She denies any numbness/weakness in the arm, dysarthria, headaches, dizziness, facial asymmetry, vision changes or diplopia. Patient also states that she has had bilateral lumbar back pain (right > left) for the past week or so.  She denies any falls, trauma or injuries.  She denies any numbness/weakness in lower extremities, bowel/bladder incontinence, urinary retention or saddle anesthesia. She states that her dad  of pancreatic cancer and she is concerned she might have this as well.         Prior to Admission Medications   Prescriptions Last Dose Informant Patient Reported? Taking?   Probiotic Product (PROBIOTIC-10) CAPS   Yes No   Sig: Take 1 tablet by mouth daily     pantoprazole (PROTONIX) 40 mg tablet   No No   Sig: Take 1 tablet (40 mg total) by mouth daily      Facility-Administered " Medications: None       Past Medical History:   Diagnosis Date    Anxiety     COVID-19     in march    Disease of thyroid gland        Past Surgical History:   Procedure Laterality Date    BREAST BIOPSY Right     benign    FRACTURE SURGERY Right     arm    WISDOM TOOTH EXTRACTION         Family History   Problem Relation Age of Onset    Dementia Mother     Heart failure Mother     Diabetes Mother     Cancer Father     Pancreatic cancer Father     Uterine cancer Sister 62    Dementia Sister     No Known Problems Sister     No Known Problems Sister     No Known Problems Maternal Grandmother     No Known Problems Maternal Grandfather     No Known Problems Paternal Grandmother     No Known Problems Paternal Grandfather     Colon cancer Neg Hx     Colon polyps Neg Hx      I have reviewed and agree with the history as documented.    E-Cigarette/Vaping    E-Cigarette Use Never User      E-Cigarette/Vaping Substances    Nicotine No     THC No     CBD No     Flavoring No     Other No     Unknown No      Social History     Tobacco Use    Smoking status: Never    Smokeless tobacco: Never   Vaping Use    Vaping status: Never Used   Substance Use Topics    Alcohol use: Yes     Alcohol/week: 2.0 standard drinks of alcohol     Types: 2 Glasses of wine per week    Drug use: No       Review of Systems   Constitutional:  Negative for chills and fever.   HENT:  Negative for congestion, rhinorrhea and sore throat.    Eyes:  Negative for visual disturbance.   Respiratory:  Negative for cough and shortness of breath.    Cardiovascular:  Positive for chest pain. Negative for palpitations and leg swelling.   Gastrointestinal:  Positive for abdominal distention, abdominal pain, diarrhea and nausea. Negative for constipation and vomiting.   Genitourinary:  Negative for dysuria, flank pain and hematuria.   Musculoskeletal:  Positive for back pain. Negative for neck pain.   Skin:  Negative for rash.   Neurological:  Negative for dizziness,  syncope, weakness and headaches.   All other systems reviewed and are negative.      Physical Exam  Physical Exam  Vitals and nursing note reviewed.   Constitutional:       General: She is awake.      Appearance: Normal appearance. She is well-developed. She is not toxic-appearing or diaphoretic.   HENT:      Head: Normocephalic and atraumatic.      Right Ear: External ear normal.      Left Ear: External ear normal.      Nose: Nose normal.      Mouth/Throat:      Lips: Pink.      Mouth: Mucous membranes are moist.   Eyes:      General: Lids are normal. No scleral icterus.     Conjunctiva/sclera: Conjunctivae normal.      Pupils: Pupils are equal, round, and reactive to light.   Cardiovascular:      Rate and Rhythm: Normal rate and regular rhythm.      Pulses: Normal pulses.           Radial pulses are 2+ on the right side and 2+ on the left side.      Heart sounds: Normal heart sounds, S1 normal and S2 normal.   Pulmonary:      Effort: Pulmonary effort is normal. No accessory muscle usage.      Breath sounds: Normal breath sounds. No stridor. No decreased breath sounds, wheezing, rhonchi or rales.   Abdominal:      General: Abdomen is flat. Bowel sounds are normal. There is no distension.      Palpations: Abdomen is soft.      Tenderness: There is generalized abdominal tenderness. There is no right CVA tenderness, left CVA tenderness, guarding or rebound.   Musculoskeletal:      Cervical back: Full passive range of motion without pain and neck supple. No signs of trauma. No pain with movement.      Right lower leg: No edema.      Left lower leg: No edema.      Comments: Tenderness to palpation of bilateral lumbar paraspinal muscles.   No midline tenderness, deformities or step-offs.   Strength 5/5 in bilateral lower extremities.  Sensation intact, PT/DP pulses 2+.       Lymphadenopathy:      Cervical: No cervical adenopathy.   Skin:     General: Skin is warm and dry.      Capillary Refill: Capillary refill takes  less than 2 seconds.      Coloration: Skin is not cyanotic, jaundiced or pale.   Neurological:      General: No focal deficit present.      Mental Status: She is alert and oriented to person, place, and time.      GCS: GCS eye subscore is 4. GCS verbal subscore is 5. GCS motor subscore is 6.      Gait: Gait normal.      Comments: No focal neurological deficits.  Strength 5/5 in bilateral extremities.  No dysarthria, facial asymmetry or visual deficits.  Sensation equal intact bilaterally.  Negative pronator drift.  Normal coordination including finger-to-nose and heel-to-geiger.  Negative Romberg.  Normal steady gait without ataxia.   Psychiatric:         Mood and Affect: Mood normal.         Speech: Speech normal.         Behavior: Behavior is cooperative.         Vital Signs  ED Triage Vitals [02/10/24 1831]   Temperature Pulse Respirations Blood Pressure SpO2   98.5 °F (36.9 °C) 84 20 145/92 95 %      Temp Source Heart Rate Source Patient Position - Orthostatic VS BP Location FiO2 (%)   Oral Monitor Sitting Right arm --      Pain Score       7           Vitals:    02/10/24 1831 02/10/24 2030 02/10/24 2100 02/10/24 2130   BP: 145/92 126/65 138/76 134/74   Pulse: 84 81 78 78   Patient Position - Orthostatic VS: Sitting   Sitting         Visual Acuity      ED Medications  Medications   sodium chloride 0.9 % bolus 1,000 mL (0 mL Intravenous Stopped 2/10/24 2137)   ketorolac (TORADOL) injection 15 mg (15 mg Intravenous Given 2/10/24 1948)   iohexol (OMNIPAQUE) 350 MG/ML injection (MULTI-DOSE) 100 mL (100 mL Intravenous Given 2/10/24 2027)       Diagnostic Studies  Results Reviewed       Procedure Component Value Units Date/Time    UA w Reflex to Microscopic w Reflex to Culture [[id number]]  (Abnormal) Collected: 02/10/24 2034    Lab Status: Final result Specimen: Urine, Other Updated: 02/10/24 2044     Color, UA Yellow     Clarity, UA Clear     Specific Gravity, UA <1.005     pH, UA 6.5     Leukocytes, UA Negative      Nitrite, UA Negative     Protein, UA Negative mg/dl      Glucose, UA Negative mg/dl      Ketones, UA 10 (1+) mg/dl      Urobilinogen, UA <2.0 mg/dl      Bilirubin, UA Negative     Occult Blood, UA Negative    TSH, 3rd generation with Free T4 reflex [[id number]]  (Normal) Collected: 02/10/24 1941    Lab Status: Final result Specimen: Blood from Arm, Left Updated: 02/10/24 2024     TSH 3RD GENERATON 2.668 uIU/mL     HS Troponin 0hr (reflex protocol) [[id number]]  (Normal) Collected: 02/10/24 1941    Lab Status: Final result Specimen: Blood from Arm, Left Updated: 02/10/24 2015     hs TnI 0hr <2 ng/L     Comprehensive metabolic panel [[id number]]  (Abnormal) Collected: 02/10/24 1941    Lab Status: Final result Specimen: Blood from Arm, Left Updated: 02/10/24 2010     Sodium 140 mmol/L      Potassium 3.9 mmol/L      Chloride 106 mmol/L      CO2 26 mmol/L      ANION GAP 8 mmol/L      BUN 10 mg/dL      Creatinine 0.82 mg/dL      Glucose 101 mg/dL      Calcium 10.4 mg/dL      AST 39 U/L      ALT 42 U/L      Alkaline Phosphatase 78 U/L      Total Protein 7.5 g/dL      Albumin 4.7 g/dL      Total Bilirubin 0.72 mg/dL      eGFR 76 ml/min/1.73sq m     Narrative:      National Kidney Disease Foundation guidelines for Chronic Kidney Disease (CKD):     Stage 1 with normal or high GFR (GFR > 90 mL/min/1.73 square meters)    Stage 2 Mild CKD (GFR = 60-89 mL/min/1.73 square meters)    Stage 3A Moderate CKD (GFR = 45-59 mL/min/1.73 square meters)    Stage 3B Moderate CKD (GFR = 30-44 mL/min/1.73 square meters)    Stage 4 Severe CKD (GFR = 15-29 mL/min/1.73 square meters)    Stage 5 End Stage CKD (GFR <15 mL/min/1.73 square meters)  Note: GFR calculation is accurate only with a steady state creatinine    Magnesium [[id number]]  (Normal) Collected: 02/10/24 1941    Lab Status: Final result Specimen: Blood from Arm, Left Updated: 02/10/24 2010     Magnesium 2.2 mg/dL     Lipase [[id number]]  (Normal) Collected: 02/10/24 1941    Lab Status:  Final result Specimen: Blood from Arm, Left Updated: 02/10/24 2010     Lipase 31 u/L     CBC and differential [[id number]]  (Abnormal) Collected: 02/10/24 1941    Lab Status: Final result Specimen: Blood from Arm, Left Updated: 02/10/24 1953     WBC 6.55 Thousand/uL      RBC 4.65 Million/uL      Hemoglobin 13.9 g/dL      Hematocrit 42.3 %      MCV 91 fL      MCH 29.9 pg      MCHC 32.9 g/dL      RDW 13.5 %      MPV 10.1 fL      Platelets 288 Thousands/uL      nRBC 0 /100 WBCs      Neutrophils Relative 41 %      Immat GRANS % 0 %      Lymphocytes Relative 48 %      Monocytes Relative 9 %      Eosinophils Relative 1 %      Basophils Relative 1 %      Neutrophils Absolute 2.71 Thousands/µL      Immature Grans Absolute 0.01 Thousand/uL      Lymphocytes Absolute 3.15 Thousands/µL      Monocytes Absolute 0.59 Thousand/µL      Eosinophils Absolute 0.06 Thousand/µL      Basophils Absolute 0.03 Thousands/µL                    CTA dissection protocol chest abdomen pelvis w wo contrast   Final Result by Al Liang DO (02/10 2128)      No aortic aneurysm or dissection. No acute abnormality.      Subcentimeter arterially enhancing lesion in the right hepatic lobe. Recommend MRI with and without contrast for complete evaluation.      Small nonobstructing bilateral renal calculi.      The study was marked in EPIC for immediate notification.      Workstation performed: [id number]                    Procedures  ECG 12 Lead Documentation Only    Date/Time: 2/10/2024 8:24 PM    Performed by: Rosanna Duncan PA-C  Authorized by: Rosanna Duncan PA-C    Indications / Diagnosis:  Cp  ECG reviewed by me, the ED Provider: yes    Patient location:  ED  Previous ECG:     Previous ECG:  Compared to current    Comparison ECG info:  10/22/21    Similarity:  No change    Comparison to cardiac monitor: Yes    Interpretation:     Interpretation: normal    Rate:     ECG rate:  65    ECG rate assessment: normal    Rhythm:     Rhythm: sinus  "rhythm    Ectopy:     Ectopy: none    QRS:     QRS axis:  Normal    QRS intervals:  Normal  Conduction:     Conduction: normal    ST segments:     ST segments:  Normal  T waves:     T waves: normal    Comments:      No STEMI.   QT/Qtc 416/432           ED Course             HEART Risk Score      Flowsheet Row Most Recent Value   Heart Score Risk Calculator    History 0 Filed at: 02/13/2024 1338   ECG 0 Filed at: 02/13/2024 1338   Age 1 Filed at: 02/13/2024 1338   Risk Factors 1 Filed at: 02/13/2024 1338   Troponin 0 Filed at: 02/13/2024 1338   HEART Score 2 Filed at: 02/13/2024 1338                                        Medical Decision Making  Patient is a 63-year-old female with a PMHx of anxiety and hypothyroidism, presenting to the ED for evaluation of abdominal pain and bloating x 3 weeks.    I reviewed all patient's labs which are unremarkable.  EKG normal sinus rhythm with no acute ischemic changes and troponin is negative, ruling out ACS/MI. No electrolyte disturbances, leukocytosis, anemia or IGOR.  Normal lipase.  CTA shows no acute abnormality.  I informed patient of the \"subcentimeter arterially enhancing lesion in the right hepatic lobe\" that will require follow-up MRI for further evaluation. Advised patient that she will need GI follow-up to further evaluate the etiology of her symptoms. She was advised to contact her PCP to arrange MRI and close follow-up or return to the ED for any new/worsening symptoms.     The management plan was discussed in detail with the patient at bedside and all questions were answered. Strict ED return instructions were discussed at bedside. Prior to discharge, both verbal and written instructions were provided. We discussed the signs and symptoms that should prompt the patient to return to the ED. All questions were answered and the patient was comfortable with the plan of care and discharged home. The patient agrees to return to the Emergency Department for concerns " and/or progression of illness.     Amount and/or Complexity of Data Reviewed  Labs: ordered.  Radiology: ordered.    Risk  Prescription drug management.             Disposition  Final diagnoses:   Abdominal pain   Abdominal bloating   Chest tightness   Liver lesion   Hepatic steatosis     Time reflects when diagnosis was documented in both MDM as applicable and the Disposition within this note       Time User Action Codes Description Comment    2/10/2024  9:48 PM Rosanna Duncan Add [R10.9] Abdominal pain     2/10/2024  9:48 PM Rosanna Duncan Add [R14.0] Abdominal bloating     2/10/2024  9:49 PM Rosanna Duncan Add [R07.89] Chest tightness     2/10/2024  9:49 PM DakotaRosanna kidd Add [K76.9] Liver lesion     2/10/2024  9:52 PM Rosanna Duncan Add [K76.0] Hepatic steatosis           ED Disposition       ED Disposition   Discharge    Condition   Stable    Date/Time   Sat Feb 10, 2024 2148    Comment   Pascale Pace discharge to home/self care.                   Follow-up Information       Follow up With Specialties Details Why Contact Info Additional Information    [facility] Schedule an appointment as soon as possible for a visit   [address]  [address]  [facility] [address]  [phone]       [facility] Schedule an appointment as soon as possible for a visit   [address]  [address]  [facility] [address]  [phone] [facility] [place], [address] [place] [address], [address], [facility]  [address]     [phone]     [facility] Emergency Department Emergency Medicine  If symptoms worsen [address] [address] [phone] [facility] Emergency Department, [address] [place], [place] [address]            Discharge Medication List as of 2/10/2024  9:52 PM        CONTINUE these  medications which have NOT CHANGED    Details   pantoprazole (PROTONIX) 40 mg tablet Take 1 tablet (40 mg total) by mouth daily, Starting Mon 6/19/2023, Normal      Probiotic Product (PROBIOTIC-10) CAPS Take 1 tablet by mouth daily  , Historical Med      levothyroxine 100 mcg tablet Take 1 tablet (100 mcg total) by mouth daily, Starting Wed 11/8/2023, Normal                 PDMP Review       None            ED Provider  Electronically Signed by             Rosanna Duncan PA-C  02/13/24 5960

## 2024-02-11 NOTE — DISCHARGE INSTRUCTIONS
"Schedule appointment with your PCP and/your GI for a follow-up MRI to further evaluate the following CT finding:  \"Subcentimeter arterially enhancing lesion in the right hepatic lobe. Recommend MRI with and without contrast for complete evaluation.\"   "

## 2024-02-12 ENCOUNTER — TELEPHONE (OUTPATIENT)
Dept: FAMILY MEDICINE CLINIC | Facility: CLINIC | Age: 64
End: 2024-02-12

## 2024-02-12 DIAGNOSIS — R10.84 GENERALIZED ABDOMINAL PAIN: ICD-10-CM

## 2024-02-12 DIAGNOSIS — E03.9 HYPOTHYROIDISM, UNSPECIFIED TYPE: ICD-10-CM

## 2024-02-12 DIAGNOSIS — E03.9 ACQUIRED HYPOTHYROIDISM: Primary | ICD-10-CM

## 2024-02-12 LAB
ATRIAL RATE: 65 BPM
P AXIS: 52 DEGREES
PR INTERVAL: 170 MS
QRS AXIS: 86 DEGREES
QRSD INTERVAL: 98 MS
QT INTERVAL: 416 MS
QTC INTERVAL: 432 MS
T WAVE AXIS: 32 DEGREES
VENTRICULAR RATE: 65 BPM

## 2024-02-12 RX ORDER — LEVOTHYROXINE SODIUM 0.1 MG/1
100 TABLET ORAL DAILY
Qty: 90 TABLET | Refills: 0 | Status: SHIPPED | OUTPATIENT
Start: 2024-02-12

## 2024-02-12 RX ORDER — LEVOTHYROXINE SODIUM 112 UG/1
112 TABLET ORAL DAILY
Qty: 90 TABLET | Refills: 0 | Status: SHIPPED | OUTPATIENT
Start: 2024-02-12 | End: 2024-02-12

## 2024-02-12 NOTE — TELEPHONE ENCOUNTER
Pt aware. Mikala will do MRI for Liver and Urology. Pt was given number for Urology because of Kidney stones.

## 2024-02-12 NOTE — TELEPHONE ENCOUNTER
----- Message from [facility] sent at 2/12/2024  8:15 AM EST -----  TSH is high. Increase Levothyroxine to 112 mcg daily-Script sent to pharmacy. Repeat TSH in 6-8- weeks-order placed at [facility]. Ha1c is 6.8-this shows controlled diabetes-continue healthy diet. Cholesterol/triglycerides are elevated-has decreased fro  m previous.Lifestyle modifications-diet/exercise decrease fats, sugars, carbs-patient unable to tolerate statins.

## 2024-02-13 DIAGNOSIS — K76.9 HEPATIC LESION: Primary | ICD-10-CM

## 2024-02-13 RX ORDER — PANTOPRAZOLE SODIUM 40 MG/1
40 TABLET, DELAYED RELEASE ORAL DAILY
Qty: 90 TABLET | Refills: 0 | Status: SHIPPED | OUTPATIENT
Start: 2024-02-13 | End: 2024-02-21 | Stop reason: SDUPTHER

## 2024-02-15 ENCOUNTER — TELEPHONE (OUTPATIENT)
Dept: FAMILY MEDICINE CLINIC | Facility: CLINIC | Age: 64
End: 2024-02-15

## 2024-02-15 NOTE — TELEPHONE ENCOUNTER
Gabriel Bach!!    Patient called for results of her abdominal ultrasound and is questioning what the following means:    Moderate to severe hepatic steatosis.     Please advise patient at [phone]    Thank you!

## 2024-02-15 NOTE — TELEPHONE ENCOUNTER
BR[facility] Delaney   [facility] Clinical  Please notify patient:    ABDOMEN ULTRASOUND, COMPLETE showed:  1. Moderate to severe hepatic steatosis.  No sonographic finding to correspond to the lesion seen on CT.  Additional evaluation with MRI abdomen with contrast should be obtained as recommended on the CT report.--- MRI already ordered  2. Gallbladder polyp which requires no follow-up.  3. Bilateral nonobstructing renal calculi.    ----- Message from BRIDGER Delaney sent at 2/15/2024  8:05 AM EST -----  Please notify patient:    Transvaginal US IMPRESSION:     Unremarkable uterus.  Ovaries not visualized.

## 2024-02-19 ENCOUNTER — TELEPHONE (OUTPATIENT)
Dept: FAMILY MEDICINE CLINIC | Facility: CLINIC | Age: 64
End: 2024-02-19

## 2024-02-19 NOTE — TELEPHONE ENCOUNTER
Pt call in office starting she received a two different order from Levothyroxine 100 mg and levothyroxine 112 mg,    Pt starting went she was in hospital TSH was normal,   Pt need a recommendation?

## 2024-02-20 NOTE — TELEPHONE ENCOUNTER
TSH was elevated then level normal when repeated in hospital. Patient was told to continue Levothyroxine 100 mcg daily and not to take the 112.She is to have TSH repeated in 6-8 weeks. Please call her and clarify this with her.

## 2024-02-21 DIAGNOSIS — R10.84 GENERALIZED ABDOMINAL PAIN: ICD-10-CM

## 2024-02-21 RX ORDER — PANTOPRAZOLE SODIUM 40 MG/1
40 TABLET, DELAYED RELEASE ORAL DAILY
Qty: 90 TABLET | Refills: 0 | Status: SHIPPED | OUTPATIENT
Start: 2024-02-21

## 2024-02-27 ENCOUNTER — TELEPHONE (OUTPATIENT)
Dept: FAMILY MEDICINE CLINIC | Facility: CLINIC | Age: 64
End: 2024-02-27

## 2024-02-27 NOTE — TELEPHONE ENCOUNTER
Pt is supposed to have MRI on Thursday    Pt wants to know if kidney shows on abdomen MRI. Wants to know if kidney stones are still there or moved.     Pt has been having stomach pains, bloating (ext). Had it for 2 months was not sure if it was due to kidney stones.     Ultrasound she just had done mentioned kidney stones but not legion.       Wanted to know what to do in terms of kidney stones. Wanted to know what to do to alleviate pain.       Wants kidney stones to be checked on abdomen MRI if not already.

## 2024-02-27 NOTE — TELEPHONE ENCOUNTER
The kidney stones on ultrasound are non obstructing. She can take Ibuprofen or Tylenol for pain. Stones may be seen on MRI but will probably need CT of abdomen to evaluate stones. She would f/up with a urologist for kidney stones.

## 2024-02-28 NOTE — TELEPHONE ENCOUNTER
Spoke to patient, she is aware of findings and Mikala's advise.  She has an appointment scheduled with Urology last April.

## 2024-02-29 ENCOUNTER — HOSPITAL ENCOUNTER (OUTPATIENT)
Dept: MRI IMAGING | Facility: HOSPITAL | Age: 64
End: 2024-02-29
Payer: COMMERCIAL

## 2024-02-29 DIAGNOSIS — K76.9 HEPATIC LESION: ICD-10-CM

## 2024-02-29 PROCEDURE — A9585 GADOBUTROL INJECTION: HCPCS

## 2024-02-29 PROCEDURE — 74183 MRI ABD W/O CNTR FLWD CNTR: CPT

## 2024-02-29 PROCEDURE — G1004 CDSM NDSC: HCPCS

## 2024-02-29 RX ORDER — GADOBUTROL 604.72 MG/ML
9 INJECTION INTRAVENOUS
Status: COMPLETED | OUTPATIENT
Start: 2024-02-29 | End: 2024-02-29

## 2024-02-29 RX ADMIN — GADOBUTROL 9 ML: 604.72 INJECTION INTRAVENOUS at 23:13

## 2024-03-01 ENCOUNTER — OFFICE VISIT (OUTPATIENT)
Dept: UROLOGY | Facility: HOSPITAL | Age: 64
End: 2024-03-01
Payer: COMMERCIAL

## 2024-03-01 VITALS
SYSTOLIC BLOOD PRESSURE: 124 MMHG | OXYGEN SATURATION: 98 % | DIASTOLIC BLOOD PRESSURE: 82 MMHG | WEIGHT: 217.6 LBS | BODY MASS INDEX: 38.55 KG/M2 | HEIGHT: 63 IN | HEART RATE: 78 BPM

## 2024-03-01 DIAGNOSIS — N20.0 CALCULUS OF KIDNEY: Primary | ICD-10-CM

## 2024-03-01 PROCEDURE — 99203 OFFICE O/P NEW LOW 30 MIN: CPT | Performed by: UROLOGY

## 2024-03-01 NOTE — PATIENT INSTRUCTIONS
DRINK 3 QUARTS (96 Oz.) LIQUIDS EACH DAY - ALL LIQUIDS COUNT       ** THESE FOODS ARE HIGH IN [place] - TRY TO LIMIT HOW MUCH OF THESE YOU EAT:  Coke and Pepsi  Nuts  Dark Leafy Greens:     Spinach and Kale, Rhubarb, Chard  Asparagus  Beets  Sweet potatoes   Blueberries, Strawberries   Dark tea (Green tea is okay)  Tofu    ADD LEMON JUICE 3 OZ. DAILY - Fresh squeezed or lemon juice concentrate - Not MinuteMaid, [place], Crystal Lite, etc.        ** Recipe for DAVID'S OLDE TYME LEMONADE:         One ounce of lemon juice, glass of water, sweetener of your choice    Coffee is okay!  Cranberry juice is good to prevent infections, but does not help for stones.

## 2024-03-01 NOTE — PROGRESS NOTES
"   HISTORY:    1.  Recent evaluation in the ER for abdominal pain, across both upper quadrants, slight radiation to the back.    CT scan shows 3 mm right renal calculus in a peripheral calyx.  Also a 2 mm stone in the left renal calyx.  Never had stones before.    A CT scan 2 years ago did not show the stones    No recent problems UTIs         ASSESSMENT / PLAN:    CT scan films reviewed and shown to the patient.  These very small peripheral calyx stones are not obstructing, and not the cause of any abdominal pain.  She is somewhat disappointed to hear this, but I think she understands after some explanation.    Because the stones are somewhat new, I gave her information about oxalate foods to avoid, importance of lots of liquids to prevent them getting bigger or making new ones.    We do not need routine visits last something comes up, she knows to follow-up if any new problems        The following portions of the patient's history were reviewed and updated as appropriate: allergies, current medications, past family history, past medical history, past social history, past surgical history, and problem list.    Review of Systems      Objective:     Physical Exam  Abdominal:      Comments: Abdomen soft, nondistended           No results found for: \"PSA\"]  BUN   Date Value Ref Range Status   02/10/2024 10 5 - 25 mg/dL Final   02/09/2024 12 8 - 27 mg/dL Final     Creatinine   Date Value Ref Range Status   02/10/2024 0.82 0.60 - 1.30 mg/dL Final     Comment:     Standardized to IDMS reference method   12/30/2016 0.91 0.57 - 1.00 mg/dL Final     No components found for: \"CBC\"      Patient Active Problem List   Diagnosis    Abnormal EKG    Abnormal vaginal bleeding    Acquired hypothyroidism    Anxiety    Chest pain, non-cardiac    Hypercholesteremia    Impaired fasting glucose    Palpitations    Pre-diabetes    COVID-19 virus infection    New onset type 2 diabetes mellitus (HCC)    Obstructive sleep apnea syndrome    " Sleep related hypoxia        Diagnoses and all orders for this visit:    Calculus of kidney           Patient ID: Pascale Morgan is a 63 y.o. female.      Current Outpatient Medications:     levothyroxine 100 mcg tablet, Take 1 tablet (100 mcg total) by mouth daily, Disp: 90 tablet, Rfl: 0    pantoprazole (PROTONIX) 40 mg tablet, Take 1 tablet (40 mg total) by mouth daily, Disp: 90 tablet, Rfl: 0  No current facility-administered medications for this visit.    Past Medical History:   Diagnosis Date    Anxiety     COVID-19     in march    Disease of thyroid gland        Past Surgical History:   Procedure Laterality Date    BREAST BIOPSY Right     benign    FRACTURE SURGERY Right     arm    WISDOM TOOTH EXTRACTION         Social History

## 2024-03-06 ENCOUNTER — OFFICE VISIT (OUTPATIENT)
Dept: GASTROENTEROLOGY | Facility: CLINIC | Age: 64
End: 2024-03-06
Payer: COMMERCIAL

## 2024-03-06 VITALS
BODY MASS INDEX: 38.27 KG/M2 | DIASTOLIC BLOOD PRESSURE: 78 MMHG | HEIGHT: 63 IN | SYSTOLIC BLOOD PRESSURE: 112 MMHG | WEIGHT: 216 LBS

## 2024-03-06 DIAGNOSIS — R10.9 ABDOMINAL PAIN: ICD-10-CM

## 2024-03-06 DIAGNOSIS — K92.89 GAS BLOAT SYNDROME: Primary | ICD-10-CM

## 2024-03-06 DIAGNOSIS — R14.0 ABDOMINAL BLOATING: ICD-10-CM

## 2024-03-06 DIAGNOSIS — K21.9 GASTROESOPHAGEAL REFLUX DISEASE WITHOUT ESOPHAGITIS: ICD-10-CM

## 2024-03-06 PROCEDURE — 99214 OFFICE O/P EST MOD 30 MIN: CPT | Performed by: INTERNAL MEDICINE

## 2024-03-06 NOTE — PROGRESS NOTES
[facility] - Outpatient Follow-up Note  Pascale Morgan 63 y.o. female MRN: [id number]  Encounter: [id number]    ASSESSMENT AND PLAN:      1. Abdominal pain  2. Abdominal bloating  3. Gas bloat syndrome  Previous history of nausea.  Now with abdominal bloating.  Not sure what to make of the dentist saying she had cobblestoning oral mucosa consistent with Crohn's disease.  Suspect this all represents functional bowel disease more than anything.  Workup over the last 2 years includes EGD with small bowel biopsy, CAT scan, ultrasound, MRI, and blood work  - Colonoscopy; Future  -If persistent symptoms and colonoscopy negative would consider test to evaluate for SIBO  -Continue pantoprazole as needed    4. Gastroesophageal reflux disease without esophagitis  Using pantoprazole as needed      Follow up appointment: After colonoscopy    _______________________      Chief Complaint   Patient presents with    Abdominal Pain    Bloated    Back pain       HPI:   Patient is a 63 y.o. female seen in the office today for ongoing GI symptoms.  She last seen by us in 2021 secondary to nausea.  An EGD at that time was negative except for some retained food.  She was treated with pantoprazole and Reglan.  She is currently really only using pantoprazole as needed.  She reports recently having issues with intermittent abdominal bloating associated with leg and back pain.  She denies any nausea or vomiting.  Denies any dysphagia, odynophagia, early satiety.  Her stools are regular.  She denies any GI bleeding.  An outpatient workup has included an ultrasound, CAT scan, and MRI which showed a fatty liver and hemangioma.  Blood work has been unrevealing.  She was in to see her dentist today who ordered evaluation thought she had cobblestoning of the mucosa which was consistent with Crohn's disease and recommended a GI evaluation.  Of note imaging has showed no abnormality in her large or small intestine  "consistent with inflammatory bowel disease.  Her last colonoscopy was 2015    Historical Information   Past Medical History:   Diagnosis Date    Anxiety     COVID-19     in march    Disease of thyroid gland      Past Surgical History:   Procedure Laterality Date    BREAST BIOPSY Right     benign    FRACTURE SURGERY Right     arm    WISDOM TOOTH EXTRACTION       Social History     Substance and Sexual Activity   Alcohol Use Yes    Alcohol/week: 2.0 standard drinks of alcohol    Types: 2 Glasses of wine per week     Social History     Substance and Sexual Activity   Drug Use No     Social History     Tobacco Use   Smoking Status Never   Smokeless Tobacco Never     Family History   Problem Relation Age of Onset    Dementia Mother     Heart failure Mother     Diabetes Mother     Cancer Father     Pancreatic cancer Father     Uterine cancer Sister 62    Dementia Sister     No Known Problems Sister     No Known Problems Sister     No Known Problems Maternal Grandmother     No Known Problems Maternal Grandfather     No Known Problems Paternal Grandmother     No Known Problems Paternal Grandfather     Colon cancer Neg Hx     Colon polyps Neg Hx          Current Outpatient Medications:     levothyroxine 100 mcg tablet    pantoprazole (PROTONIX) 40 mg tablet  Allergies   Allergen Reactions    Sulfa Antibiotics Anaphylaxis    Statins Hives     Reviewed medications and allergies and updated as indicated    PHYSICAL EXAM:    Blood pressure 112/78, height 5' 3\" (1.6 m), weight 98 kg (216 lb). Body mass index is 38.26 kg/m².  General Appearance: NAD, cooperative, alert  Eyes: Anicteric  Chest: Clear to auscultation  Heart: Regular rate and rhythm  GI:  Soft, non-tender, non-distended; normal bowel sounds; no masses, no organomegaly   Rectal: Deferred  Musculoskeletal: No edema.  Skin:  No jaundice    Lab Results:   Lab Results   Component Value Date    WBC 6.55 02/10/2024    WBC 5.4 02/07/2024    WBC 7.05 10/22/2021    HGB 13.9 " 02/10/2024    HGB 14.1 02/07/2024    HGB 13.4 10/22/2021    MCV 91 02/10/2024     02/10/2024     02/07/2024     10/22/2021     Lab Results   Component Value Date     12/30/2016    K 3.9 02/10/2024     02/10/2024    CO2 26 02/10/2024    ANIONGAP 11 07/22/2015    BUN 10 02/10/2024    CREATININE 0.82 02/10/2024    GLUCOSE 132 (H) 12/30/2016    CALCIUM 10.4 (H) 02/10/2024    AST 39 02/10/2024    AST 46 (H) 02/09/2024    AST 33 06/15/2023    ALT 42 02/10/2024    ALT 43 (H) 02/09/2024    ALT 32 06/15/2023    ALKPHOS 78 02/10/2024    ALKPHOS 106 10/22/2021    ALKPHOS 86 04/02/2021    PROT 7.6 12/30/2016    BILITOT 0.8 12/30/2016    BILITOT 0.9 06/17/2016    BILITOT 0.7 07/22/2015    EGFR 76 02/10/2024     Lab Results   Component Value Date    LIPASE 31 02/10/2024       Radiology Results:     MRI abdomen w wo contrast  Result Date: 3/5/2024  Impression: Hepatomegaly with severe hepatic steatosis. 7 mm hepatic segment 6 lesion in keeping with a benign flash filling hemangioma correlates to the indeterminate finding seen on comparison CT. No suspicious hepatic lesions.      US abdomen complete  Result Date: 2/15/2024  Impression: 1. Moderate to severe hepatic steatosis. No sonographic finding to correspond to the lesion seen on CT. Additional evaluation with MRI abdomen with contrast should be obtained as recommended on the CT report. 2. Gallbladder polyp which requires no follow-up. 3. Bilateral nonobstructing renal calculi.

## 2024-03-06 NOTE — LETTER
March 6, 2024     Rosanna Duncan PA-C  [address] [facility] [id number]    Patient: Pascale Morgan   YOB: 1960   Date of Visit: 3/6/2024       Dear Dr. Duncan:    Thank you for referring Pascale Morgan to me for evaluation. Below are my notes for this consultation.    If you have questions, please do not hesitate to call me. I look forward to following your patient along with you.         Sincerely,        Jhon Jones MD        CC: No Recipients    Jhon Jones MD  3/6/2024  4:05 PM  Sign when Signing Visit  [facility] - Outpatient Follow-up Note  Pascale Morgan 63 y.o. female MRN: [id number]  Encounter: [id number]    ASSESSMENT AND PLAN:      1. Abdominal pain  2. Abdominal bloating  3. Gas bloat syndrome  Previous history of nausea.  Now with abdominal bloating.  Not sure what to make of the dentist saying she had cobblestoning oral mucosa consistent with Crohn's disease.  Suspect this all represents functional bowel disease more than anything.  Workup over the last 2 years includes EGD with small bowel biopsy, CAT scan, ultrasound, MRI, and blood work  - Colonoscopy; Future  -If persistent symptoms and colonoscopy negative would consider test to evaluate for SIBO  -Continue pantoprazole as needed    4. Gastroesophageal reflux disease without esophagitis  Using pantoprazole as needed      Follow up appointment: After colonoscopy    _______________________      Chief Complaint   Patient presents with   • Abdominal Pain   • Bloated   • Back pain       HPI:   Patient is a 63 y.o. female seen in the office today for ongoing GI symptoms.  She last seen by us in 2021 secondary to nausea.  An EGD at that time was negative except for some retained food.  She was treated with pantoprazole and Reglan.  She is currently really only using pantoprazole as needed.  She reports recently having issues with intermittent abdominal bloating associated with leg and back  pain.  She denies any nausea or vomiting.  Denies any dysphagia, odynophagia, early satiety.  Her stools are regular.  She denies any GI bleeding.  An outpatient workup has included an ultrasound, CAT scan, and MRI which showed a fatty liver and hemangioma.  Blood work has been unrevealing.  She was in to see her dentist today who ordered evaluation thought she had cobblestoning of the mucosa which was consistent with Crohn's disease and recommended a GI evaluation.  Of note imaging has showed no abnormality in her large or small intestine consistent with inflammatory bowel disease.  Her last colonoscopy was 2015    Historical Information  Past Medical History:   Diagnosis Date   • Anxiety    • COVID-19     in march   • Disease of thyroid gland      Past Surgical History:   Procedure Laterality Date   • BREAST BIOPSY Right     benign   • FRACTURE SURGERY Right     arm   • WISDOM TOOTH EXTRACTION       Social History     Substance and Sexual Activity   Alcohol Use Yes   • Alcohol/week: 2.0 standard drinks of alcohol   • Types: 2 Glasses of wine per week     Social History     Substance and Sexual Activity   Drug Use No     Social History     Tobacco Use   Smoking Status Never   Smokeless Tobacco Never     Family History   Problem Relation Age of Onset   • Dementia Mother    • Heart failure Mother    • Diabetes Mother    • Cancer Father    • Pancreatic cancer Father    • Uterine cancer Sister 62   • Dementia Sister    • No Known Problems Sister    • No Known Problems Sister    • No Known Problems Maternal Grandmother    • No Known Problems Maternal Grandfather    • No Known Problems Paternal Grandmother    • No Known Problems Paternal Grandfather    • Colon cancer Neg Hx    • Colon polyps Neg Hx          Current Outpatient Medications:   •  levothyroxine 100 mcg tablet  •  pantoprazole (PROTONIX) 40 mg tablet  Allergies   Allergen Reactions   • Sulfa Antibiotics Anaphylaxis   • Statins Hives     Reviewed medications  "and allergies and updated as indicated    PHYSICAL EXAM:    Blood pressure 112/78, height 5' 3\" (1.6 m), weight 98 kg (216 lb). Body mass index is 38.26 kg/m².  General Appearance: NAD, cooperative, alert  Eyes: Anicteric  Chest: Clear to auscultation  Heart: Regular rate and rhythm  GI:  Soft, non-tender, non-distended; normal bowel sounds; no masses, no organomegaly   Rectal: Deferred  Musculoskeletal: No edema.  Skin:  No jaundice    Lab Results:   Lab Results   Component Value Date    WBC 6.55 02/10/2024    WBC 5.4 02/07/2024    WBC 7.05 10/22/2021    HGB 13.9 02/10/2024    HGB 14.1 02/07/2024    HGB 13.4 10/22/2021    MCV 91 02/10/2024     02/10/2024     02/07/2024     10/22/2021     Lab Results   Component Value Date     12/30/2016    K 3.9 02/10/2024     02/10/2024    CO2 26 02/10/2024    ANIONGAP 11 07/22/2015    BUN 10 02/10/2024    CREATININE 0.82 02/10/2024    GLUCOSE 132 (H) 12/30/2016    CALCIUM 10.4 (H) 02/10/2024    AST 39 02/10/2024    AST 46 (H) 02/09/2024    AST 33 06/15/2023    ALT 42 02/10/2024    ALT 43 (H) 02/09/2024    ALT 32 06/15/2023    ALKPHOS 78 02/10/2024    ALKPHOS 106 10/22/2021    ALKPHOS 86 04/02/2021    PROT 7.6 12/30/2016    BILITOT 0.8 12/30/2016    BILITOT 0.9 06/17/2016    BILITOT 0.7 07/22/2015    EGFR 76 02/10/2024     Lab Results   Component Value Date    LIPASE 31 02/10/2024       Radiology Results:     MRI abdomen w wo contrast  Result Date: 3/5/2024  Impression: Hepatomegaly with severe hepatic steatosis. 7 mm hepatic segment 6 lesion in keeping with a benign flash filling hemangioma correlates to the indeterminate finding seen on comparison CT. No suspicious hepatic lesions.      US abdomen complete  Result Date: 2/15/2024  Impression: 1. Moderate to severe hepatic steatosis. No sonographic finding to correspond to the lesion seen on CT. Additional evaluation with MRI abdomen with contrast should be obtained as recommended on the CT " report. 2. Gallbladder polyp which requires no follow-up. 3. Bilateral nonobstructing renal calculi.

## 2024-03-11 ENCOUNTER — NURSE TRIAGE (OUTPATIENT)
Dept: OTHER | Facility: OTHER | Age: 64
End: 2024-03-11

## 2024-03-11 PROCEDURE — 83690 ASSAY OF LIPASE: CPT | Performed by: EMERGENCY MEDICINE

## 2024-03-11 PROCEDURE — 85025 COMPLETE CBC W/AUTO DIFF WBC: CPT | Performed by: EMERGENCY MEDICINE

## 2024-03-11 PROCEDURE — 36415 COLL VENOUS BLD VENIPUNCTURE: CPT

## 2024-03-11 PROCEDURE — 99285 EMERGENCY DEPT VISIT HI MDM: CPT

## 2024-03-11 PROCEDURE — 80053 COMPREHEN METABOLIC PANEL: CPT | Performed by: EMERGENCY MEDICINE

## 2024-03-12 ENCOUNTER — HOSPITAL ENCOUNTER (EMERGENCY)
Facility: HOSPITAL | Age: 64
Discharge: HOME | End: 2024-03-12
Attending: EMERGENCY MEDICINE
Payer: COMMERCIAL

## 2024-03-12 ENCOUNTER — HOSPITAL ENCOUNTER (INPATIENT)
Facility: HOSPITAL | Age: 64
LOS: 1 days | Discharge: HOME | DRG: 395 | End: 2024-03-13
Attending: EMERGENCY MEDICINE | Admitting: INTERNAL MEDICINE
Payer: COMMERCIAL

## 2024-03-12 ENCOUNTER — APPOINTMENT (EMERGENCY)
Dept: CT IMAGING | Facility: HOSPITAL | Age: 64
End: 2024-03-12
Payer: COMMERCIAL

## 2024-03-12 ENCOUNTER — NURSE TRIAGE (OUTPATIENT)
Age: 64
End: 2024-03-12

## 2024-03-12 ENCOUNTER — TELEPHONE (OUTPATIENT)
Age: 64
End: 2024-03-12

## 2024-03-12 VITALS
HEART RATE: 77 BPM | RESPIRATION RATE: 18 BRPM | SYSTOLIC BLOOD PRESSURE: 123 MMHG | TEMPERATURE: 98.8 F | DIASTOLIC BLOOD PRESSURE: 66 MMHG | OXYGEN SATURATION: 97 % | BODY MASS INDEX: 35.44 KG/M2 | HEIGHT: 63 IN | WEIGHT: 200 LBS

## 2024-03-12 DIAGNOSIS — K62.5 RECTAL BLEEDING: ICD-10-CM

## 2024-03-12 DIAGNOSIS — K63.89 EPIPLOIC APPENDAGITIS: ICD-10-CM

## 2024-03-12 DIAGNOSIS — K92.2 GI BLEED: Primary | ICD-10-CM

## 2024-03-12 DIAGNOSIS — R10.9 ABDOMINAL PAIN: Primary | ICD-10-CM

## 2024-03-12 LAB
ALBUMIN SERPL BCP-MCNC: 4.6 G/DL (ref 3.5–5)
ALBUMIN SERPL BCP-MCNC: 4.6 G/DL (ref 3.5–5)
ALP SERPL-CCNC: 90 U/L (ref 34–104)
ALP SERPL-CCNC: 92 U/L (ref 34–104)
ALT SERPL W P-5'-P-CCNC: 42 U/L (ref 7–52)
ALT SERPL W P-5'-P-CCNC: 45 U/L (ref 7–52)
ANION GAP SERPL CALCULATED.3IONS-SCNC: 12 MMOL/L
ANION GAP SERPL CALCULATED.3IONS-SCNC: 8 MMOL/L (ref 4–13)
APTT PPP: 26 SECONDS (ref 23–37)
AST SERPL W P-5'-P-CCNC: 46 U/L (ref 13–39)
AST SERPL W P-5'-P-CCNC: 52 U/L (ref 13–39)
BACTERIA UR QL AUTO: NORMAL /HPF
BASOPHILS # BLD AUTO: 0.05 THOUSANDS/ÂΜL (ref 0–0.1)
BASOPHILS # BLD AUTO: 0.05 THOUSANDS/ÂΜL (ref 0–0.1)
BASOPHILS NFR BLD AUTO: 1 % (ref 0–1)
BASOPHILS NFR BLD AUTO: 1 % (ref 0–1)
BILIRUB SERPL-MCNC: 0.78 MG/DL (ref 0.2–1)
BILIRUB SERPL-MCNC: 0.97 MG/DL (ref 0.2–1)
BILIRUB UR QL STRIP: NEGATIVE
BILIRUB UR QL STRIP: NEGATIVE
BUN SERPL-MCNC: 11 MG/DL (ref 5–25)
BUN SERPL-MCNC: 8 MG/DL (ref 5–25)
CALCIUM SERPL-MCNC: 9.9 MG/DL (ref 8.4–10.2)
CALCIUM SERPL-MCNC: 9.9 MG/DL (ref 8.4–10.2)
CHLORIDE SERPL-SCNC: 104 MMOL/L (ref 96–108)
CHLORIDE SERPL-SCNC: 104 MMOL/L (ref 96–108)
CLARITY UR: CLEAR
CLARITY UR: CLEAR
CO2 SERPL-SCNC: 22 MMOL/L (ref 21–32)
CO2 SERPL-SCNC: 27 MMOL/L (ref 21–32)
COLOR UR: ABNORMAL
COLOR UR: NORMAL
CREAT SERPL-MCNC: 0.74 MG/DL (ref 0.6–1.3)
CREAT SERPL-MCNC: 0.82 MG/DL (ref 0.6–1.3)
EOSINOPHIL # BLD AUTO: 0.05 THOUSAND/ÂΜL (ref 0–0.61)
EOSINOPHIL # BLD AUTO: 0.08 THOUSAND/ÂΜL (ref 0–0.61)
EOSINOPHIL NFR BLD AUTO: 1 % (ref 0–6)
EOSINOPHIL NFR BLD AUTO: 1 % (ref 0–6)
ERYTHROCYTE [DISTWIDTH] IN BLOOD BY AUTOMATED COUNT: 13.2 % (ref 11.6–15.1)
ERYTHROCYTE [DISTWIDTH] IN BLOOD BY AUTOMATED COUNT: 13.2 % (ref 11.6–15.1)
GFR SERPL CREATININE-BSD FRML MDRD: 76 ML/MIN/1.73SQ M
GFR SERPL CREATININE-BSD FRML MDRD: 87 ML/MIN/1.73SQ M
GLUCOSE SERPL-MCNC: 125 MG/DL (ref 65–140)
GLUCOSE SERPL-MCNC: 127 MG/DL (ref 65–140)
GLUCOSE UR STRIP-MCNC: NEGATIVE MG/DL
GLUCOSE UR STRIP-MCNC: NEGATIVE MG/DL
HCT VFR BLD AUTO: 42.2 % (ref 34.8–46.1)
HCT VFR BLD AUTO: 43.1 % (ref 34.8–46.1)
HGB BLD-MCNC: 13.8 G/DL (ref 11.5–15.4)
HGB BLD-MCNC: 14.1 G/DL (ref 11.5–15.4)
HGB UR QL STRIP.AUTO: NEGATIVE
HGB UR QL STRIP.AUTO: NEGATIVE
IMM GRANULOCYTES # BLD AUTO: 0.03 THOUSAND/UL (ref 0–0.2)
IMM GRANULOCYTES # BLD AUTO: 0.04 THOUSAND/UL (ref 0–0.2)
IMM GRANULOCYTES NFR BLD AUTO: 1 % (ref 0–2)
IMM GRANULOCYTES NFR BLD AUTO: 1 % (ref 0–2)
INR PPP: 0.97 (ref 0.84–1.19)
KETONES UR STRIP-MCNC: NEGATIVE MG/DL
KETONES UR STRIP-MCNC: NEGATIVE MG/DL
LEUKOCYTE ESTERASE UR QL STRIP: NEGATIVE
LEUKOCYTE ESTERASE UR QL STRIP: NEGATIVE
LIPASE SERPL-CCNC: 31 U/L (ref 11–82)
LYMPHOCYTES # BLD AUTO: 1.83 THOUSANDS/ÂΜL (ref 0.6–4.47)
LYMPHOCYTES # BLD AUTO: 2.76 THOUSANDS/ÂΜL (ref 0.6–4.47)
LYMPHOCYTES NFR BLD AUTO: 33 % (ref 14–44)
LYMPHOCYTES NFR BLD AUTO: 35 % (ref 14–44)
MCH RBC QN AUTO: 29.6 PG (ref 26.8–34.3)
MCH RBC QN AUTO: 29.9 PG (ref 26.8–34.3)
MCHC RBC AUTO-ENTMCNC: 32.7 G/DL (ref 31.4–37.4)
MCHC RBC AUTO-ENTMCNC: 32.7 G/DL (ref 31.4–37.4)
MCV RBC AUTO: 90 FL (ref 82–98)
MCV RBC AUTO: 92 FL (ref 82–98)
MONOCYTES # BLD AUTO: 0.49 THOUSAND/ÂΜL (ref 0.17–1.22)
MONOCYTES # BLD AUTO: 0.61 THOUSAND/ÂΜL (ref 0.17–1.22)
MONOCYTES NFR BLD AUTO: 8 % (ref 4–12)
MONOCYTES NFR BLD AUTO: 9 % (ref 4–12)
NEUTROPHILS # BLD AUTO: 3.16 THOUSANDS/ÂΜL (ref 1.85–7.62)
NEUTROPHILS # BLD AUTO: 4.33 THOUSANDS/ÂΜL (ref 1.85–7.62)
NEUTS SEG NFR BLD AUTO: 54 % (ref 43–75)
NEUTS SEG NFR BLD AUTO: 55 % (ref 43–75)
NITRITE UR QL STRIP: NEGATIVE
NITRITE UR QL STRIP: NEGATIVE
NON-SQ EPI CELLS URNS QL MICRO: NORMAL /HPF
NRBC BLD AUTO-RTO: 0 /100 WBCS
NRBC BLD AUTO-RTO: 0 /100 WBCS
PH UR STRIP.AUTO: 6.5 [PH]
PH UR STRIP.AUTO: 6.5 [PH]
PLATELET # BLD AUTO: 310 THOUSANDS/UL (ref 149–390)
PLATELET # BLD AUTO: 316 THOUSANDS/UL (ref 149–390)
PMV BLD AUTO: 10.1 FL (ref 8.9–12.7)
PMV BLD AUTO: 10.4 FL (ref 8.9–12.7)
POTASSIUM SERPL-SCNC: 3.5 MMOL/L (ref 3.5–5.3)
POTASSIUM SERPL-SCNC: 3.7 MMOL/L (ref 3.5–5.3)
PROT SERPL-MCNC: 7.8 G/DL (ref 6.4–8.4)
PROT SERPL-MCNC: 7.9 G/DL (ref 6.4–8.4)
PROT UR STRIP-MCNC: ABNORMAL MG/DL
PROT UR STRIP-MCNC: NEGATIVE MG/DL
PROTHROMBIN TIME: 13.3 SECONDS (ref 11.6–14.5)
RBC # BLD AUTO: 4.67 MILLION/UL (ref 3.81–5.12)
RBC # BLD AUTO: 4.71 MILLION/UL (ref 3.81–5.12)
RBC #/AREA URNS AUTO: NORMAL /HPF
SODIUM SERPL-SCNC: 138 MMOL/L (ref 135–147)
SODIUM SERPL-SCNC: 139 MMOL/L (ref 135–147)
SP GR UR STRIP.AUTO: 1.01 (ref 1–1.03)
SP GR UR STRIP.AUTO: <1.005 (ref 1–1.03)
UROBILINOGEN UR STRIP-ACNC: <2 MG/DL
UROBILINOGEN UR STRIP-ACNC: <2 MG/DL
WBC # BLD AUTO: 5.61 THOUSAND/UL (ref 4.31–10.16)
WBC # BLD AUTO: 7.87 THOUSAND/UL (ref 4.31–10.16)
WBC #/AREA URNS AUTO: NORMAL /HPF

## 2024-03-12 PROCEDURE — 36415 COLL VENOUS BLD VENIPUNCTURE: CPT | Performed by: EMERGENCY MEDICINE

## 2024-03-12 PROCEDURE — 96375 TX/PRO/DX INJ NEW DRUG ADDON: CPT

## 2024-03-12 PROCEDURE — C9113 INJ PANTOPRAZOLE SODIUM, VIA: HCPCS | Performed by: INTERNAL MEDICINE

## 2024-03-12 PROCEDURE — 99285 EMERGENCY DEPT VISIT HI MDM: CPT | Performed by: EMERGENCY MEDICINE

## 2024-03-12 PROCEDURE — 81001 URINALYSIS AUTO W/SCOPE: CPT | Performed by: EMERGENCY MEDICINE

## 2024-03-12 PROCEDURE — 99223 1ST HOSP IP/OBS HIGH 75: CPT | Performed by: INTERNAL MEDICINE

## 2024-03-12 PROCEDURE — 96374 THER/PROPH/DIAG INJ IV PUSH: CPT

## 2024-03-12 PROCEDURE — C9113 INJ PANTOPRAZOLE SODIUM, VIA: HCPCS | Performed by: EMERGENCY MEDICINE

## 2024-03-12 PROCEDURE — 99285 EMERGENCY DEPT VISIT HI MDM: CPT

## 2024-03-12 PROCEDURE — 85730 THROMBOPLASTIN TIME PARTIAL: CPT | Performed by: EMERGENCY MEDICINE

## 2024-03-12 PROCEDURE — 96361 HYDRATE IV INFUSION ADD-ON: CPT

## 2024-03-12 PROCEDURE — 81003 URINALYSIS AUTO W/O SCOPE: CPT | Performed by: INTERNAL MEDICINE

## 2024-03-12 PROCEDURE — 85025 COMPLETE CBC W/AUTO DIFF WBC: CPT | Performed by: EMERGENCY MEDICINE

## 2024-03-12 PROCEDURE — 80053 COMPREHEN METABOLIC PANEL: CPT | Performed by: EMERGENCY MEDICINE

## 2024-03-12 PROCEDURE — 94664 DEMO&/EVAL PT USE INHALER: CPT

## 2024-03-12 PROCEDURE — 74177 CT ABD & PELVIS W/CONTRAST: CPT

## 2024-03-12 PROCEDURE — 85610 PROTHROMBIN TIME: CPT | Performed by: EMERGENCY MEDICINE

## 2024-03-12 RX ORDER — PANTOPRAZOLE SODIUM 40 MG/10ML
40 INJECTION, POWDER, LYOPHILIZED, FOR SOLUTION INTRAVENOUS EVERY 12 HOURS SCHEDULED
Status: DISCONTINUED | OUTPATIENT
Start: 2024-03-12 | End: 2024-03-13 | Stop reason: HOSPADM

## 2024-03-12 RX ORDER — LEVOTHYROXINE SODIUM 0.1 MG/1
100 TABLET ORAL
Status: DISCONTINUED | OUTPATIENT
Start: 2024-03-13 | End: 2024-03-13 | Stop reason: HOSPADM

## 2024-03-12 RX ORDER — ONDANSETRON 2 MG/ML
4 INJECTION INTRAMUSCULAR; INTRAVENOUS EVERY 6 HOURS PRN
Status: DISCONTINUED | OUTPATIENT
Start: 2024-03-12 | End: 2024-03-13 | Stop reason: HOSPADM

## 2024-03-12 RX ORDER — PANTOPRAZOLE SODIUM 40 MG/10ML
40 INJECTION, POWDER, LYOPHILIZED, FOR SOLUTION INTRAVENOUS ONCE
Status: COMPLETED | OUTPATIENT
Start: 2024-03-12 | End: 2024-03-12

## 2024-03-12 RX ORDER — ACETAMINOPHEN 325 MG/1
650 TABLET ORAL EVERY 6 HOURS PRN
Status: DISCONTINUED | OUTPATIENT
Start: 2024-03-12 | End: 2024-03-13 | Stop reason: HOSPADM

## 2024-03-12 RX ORDER — ACETAMINOPHEN 325 MG/1
975 TABLET ORAL ONCE
Status: COMPLETED | OUTPATIENT
Start: 2024-03-12 | End: 2024-03-12

## 2024-03-12 RX ORDER — ONDANSETRON 2 MG/ML
4 INJECTION INTRAMUSCULAR; INTRAVENOUS ONCE
Status: COMPLETED | OUTPATIENT
Start: 2024-03-12 | End: 2024-03-12

## 2024-03-12 RX ADMIN — ACETAMINOPHEN 975 MG: 325 TABLET, FILM COATED ORAL at 03:20

## 2024-03-12 RX ADMIN — PANTOPRAZOLE SODIUM 40 MG: 40 INJECTION, POWDER, FOR SOLUTION INTRAVENOUS at 20:36

## 2024-03-12 RX ADMIN — POLYETHYLENE GLYCOL 3350, SODIUM SULFATE ANHYDROUS, SODIUM BICARBONATE, SODIUM CHLORIDE, POTASSIUM CHLORIDE 4000 ML: 236; 22.74; 6.74; 5.86; 2.97 POWDER, FOR SOLUTION ORAL at 17:40

## 2024-03-12 RX ADMIN — IOHEXOL 100 ML: 350 INJECTION, SOLUTION INTRAVENOUS at 02:48

## 2024-03-12 RX ADMIN — SODIUM CHLORIDE 1000 ML: 0.9 INJECTION, SOLUTION INTRAVENOUS at 02:53

## 2024-03-12 RX ADMIN — PANTOPRAZOLE SODIUM 40 MG: 40 INJECTION, POWDER, FOR SOLUTION INTRAVENOUS at 03:21

## 2024-03-12 RX ADMIN — ONDANSETRON 4 MG: 2 INJECTION INTRAMUSCULAR; INTRAVENOUS at 03:21

## 2024-03-12 NOTE — TELEPHONE ENCOUNTER
"Last OV 3/6/24 Dr Jones  Next OV 4/29/24    Pt states she was seen in ED (see Health Call/Nurse Triage from yesterday) and was advised could be admitted or follow w/ GI. Pt reports significant amount of rectal bleeding when she went to urinate just now; no BM, toilet water red. Reviewed alarm symptoms with pt. Pt will be returning to ED.       Reason for Disposition   MODERATE rectal bleeding (small blood clots, passing blood without stool, or toilet water turns red) more than once a day    Answer Assessment - Initial Assessment Questions  1. APPEARANCE of BLOOD: \"What color is it?\" \"Is it passed separately, on the surface of the stool, or mixed in with the stool?\"       Red without BM  2. AMOUNT: \"How much blood was passed?\"       Toilet water red.    Protocols used: Rectal Bleeding-ADULT-[place]    "

## 2024-03-12 NOTE — TELEPHONE ENCOUNTER
"Reason for Disposition  • Blood in bowel movements (Exception: blood on surface of BM with constipation)    Answer Assessment - Initial Assessment Questions  1. LOCATION: \"Where does it hurt?\"       Lower left abdomen     2. RADIATION: \"Does the pain shoot anywhere else?\" (e.g., chest, back)      Right lower Back, right leg    3. ONSET: \"When did the pain begin?\" (e.g., minutes, hours or days ago)       3 days ago     4. SUDDEN: \"Gradual or sudden onset?\"      Gradual     5. PATTERN \"Does the pain come and go, or is it constant?\"     - If constant: \"Is it getting better, staying the same, or worsening?\"       (Note: Constant means the pain never goes away completely; most serious pain is constant and it progresses)      - If intermittent: \"How long does it last?\" \"Do you have pain now?\"      (Note: Intermittent means the pain goes away completely between bouts)      Constant     6. SEVERITY: \"How bad is the pain?\"  (e.g., Scale 1-10; mild, moderate, or severe)    - MILD (1-3): doesn't interfere with normal activities, abdomen soft and not tender to touch     - MODERATE (4-7): interferes with normal activities or awakens from sleep, tender to touch     - SEVERE (8-10): excruciating pain, doubled over, unable to do any normal activities       4/10    7. RECURRENT SYMPTOM: \"Have you ever had this type of stomach pain before?\" If Yes, ask: \"When was the last time?\" and \"What happened that time?\"       Yes    8. CAUSE: \"What do you think is causing the stomach pain?\"      Unknown     9. RELIEVING/AGGRAVATING FACTORS: \"What makes it better or worse?\" (e.g., movement, antacids, bowel movement)      Nothing makes the pain better or worse     10. OTHER SYMPTOMS: \"Has there been any vomiting, diarrhea, constipation, or urine problems?\"        Blood in stool times two (mushy, a lot of blood, clump of blood)    11. PREGNANCY: \"Is there any chance you are pregnant?\" \"When was your last menstrual period?\"       N/A    Protocols " used: Abdominal Pain - Female-ADULT-[facility]

## 2024-03-12 NOTE — TELEPHONE ENCOUNTER
Pt calling in, she is not sure if she should go to the ED again or if she can have a sooner colonoscopy done. After discussing pt symptoms and continuing rectal bleeding pt will go back to the [facility] ED.

## 2024-03-12 NOTE — DISCHARGE INSTRUCTIONS
Please follow up with GI as soon as possible for further care, if symptoms worsen please return to the emergency department.

## 2024-03-12 NOTE — TELEPHONE ENCOUNTER
Chart reviewed, hemoglobin 13.8.  CT showed epiploic appendagitis.  Was discharged from the ER at 3 AM and told to return by triage nurse due to continued bleeding, colonoscopy scheduled for 3/18/2024.  Notified Dr. Pryor and Marija Moss PA-C covering SLUBof her ER evaluation and possible admission

## 2024-03-12 NOTE — ASSESSMENT & PLAN NOTE
"Patient admitted with multiple episodes of rectal bleeding and lower abdominal pain  CT abdomen pelvis-\"Findings of epiploic appendagitis. Given presence near the bladder dome, this may cause urinary symptoms.\"  Hemoglobin on admission is 14.1  Trend H&H every 8 hours  GI consult  Spoke with GI on-call and he recommended starting GoLytely and making patient n.p.o. after midnight for colonoscopy in a.m.  On Protonix  Avoid antiplatelet/anticoagulants  "

## 2024-03-12 NOTE — TELEPHONE ENCOUNTER
Scheduled date of colonoscopy (as of today):  3/18/24    Physician performing colonoscopy: Dr. Pryor    Location of colonoscopy: [facility]    Bowel prep reviewed with patient: Miralax    Prep instructions sent via [employer]    Reschedued from 4/29/24 needed sooner procedure

## 2024-03-12 NOTE — ED PROVIDER NOTES
History  Chief Complaint   Patient presents with   • Abdominal Pain     Pt c/o of bright red blood in stool. Pt has colnospy scheduled in April.     63-year-old female with gas bloating pain currently being seen by gastroenterology, has follow-up scheduled with colonoscopy in April presents for evaluation of lower abdominal pain also 2 episodes of blood in stool, has chronic nausea after being diagnosed with gastroparesis, no vomiting, no fevers no chills, no urinary complaints.        Prior to Admission Medications   Prescriptions Last Dose Informant Patient Reported? Taking?   levothyroxine 100 mcg tablet  Self No No   Sig: Take 1 tablet (100 mcg total) by mouth daily   pantoprazole (PROTONIX) 40 mg tablet  Self No No   Sig: Take 1 tablet (40 mg total) by mouth daily      Facility-Administered Medications: None       Past Medical History:   Diagnosis Date   • Anxiety    • COVID-19     in march   • Disease of thyroid gland        Past Surgical History:   Procedure Laterality Date   • BREAST BIOPSY Right     benign   • FRACTURE SURGERY Right     arm   • WISDOM TOOTH EXTRACTION         Family History   Problem Relation Age of Onset   • Dementia Mother    • Heart failure Mother    • Diabetes Mother    • Cancer Father    • Pancreatic cancer Father    • Uterine cancer Sister 62   • Dementia Sister    • No Known Problems Sister    • No Known Problems Sister    • No Known Problems Maternal Grandmother    • No Known Problems Maternal Grandfather    • No Known Problems Paternal Grandmother    • No Known Problems Paternal Grandfather    • Colon cancer Neg Hx    • Colon polyps Neg Hx      I have reviewed and agree with the history as documented.    E-Cigarette/Vaping   • E-Cigarette Use Never User      E-Cigarette/Vaping Substances   • Nicotine No    • THC No    • CBD No    • Flavoring No    • Other No    • Unknown No      Social History     Tobacco Use   • Smoking status: Never   • Smokeless tobacco: Never   Vaping Use   •  Vaping status: Never Used   Substance Use Topics   • Alcohol use: Yes     Alcohol/week: 2.0 standard drinks of alcohol     Types: 2 Glasses of wine per week   • Drug use: No       Review of Systems   Constitutional:  Negative for appetite change and fever.   HENT:  Negative for rhinorrhea and sore throat.    Eyes:  Negative for photophobia and visual disturbance.   Respiratory:  Negative for cough, chest tightness and wheezing.    Cardiovascular:  Negative for chest pain, palpitations and leg swelling.   Gastrointestinal:  Positive for abdominal pain, blood in stool and nausea. Negative for abdominal distention, constipation, diarrhea and vomiting.   Genitourinary:  Negative for dysuria, flank pain, frequency, hematuria and urgency.   Musculoskeletal:  Negative for back pain.   Skin:  Negative for rash.   Neurological:  Negative for dizziness, weakness and headaches.   All other systems reviewed and are negative.      Physical Exam  Physical Exam  Vitals and nursing note reviewed.   Constitutional:       Appearance: She is well-developed.   HENT:      Head: Normocephalic and atraumatic.   Eyes:      Pupils: Pupils are equal, round, and reactive to light.   Cardiovascular:      Rate and Rhythm: Normal rate and regular rhythm.      Heart sounds: No murmur heard.     No friction rub. No gallop.   Pulmonary:      Effort: Pulmonary effort is normal.      Breath sounds: No wheezing or rales.   Chest:      Chest wall: No tenderness.   Abdominal:      General: There is no distension.      Palpations: Abdomen is soft. There is no mass.      Tenderness: There is abdominal tenderness in the left lower quadrant. There is no guarding or rebound.   Genitourinary:     Rectum: Normal. No mass or tenderness.      Comments: Small external hemorrhoids, no bleeding hemorrhoids, no palpable internal hemorrhoids  Musculoskeletal:      Cervical back: Normal range of motion and neck supple.   Skin:     General: Skin is warm and dry.    Neurological:      Mental Status: She is alert and oriented to person, place, and time.         Vital Signs  ED Triage Vitals   Temperature Pulse Respirations Blood Pressure SpO2   03/11/24 2347 03/11/24 2347 03/11/24 2347 03/11/24 2349 03/11/24 2347   98.8 °F (37.1 °C) 94 18 144/100 94 %      Temp src Heart Rate Source Patient Position - Orthostatic VS BP Location FiO2 (%)   -- 03/12/24 0146 -- 03/12/24 0146 --    Monitor  Right arm       Pain Score       03/11/24 2347       4           Vitals:    03/11/24 2347 03/11/24 2349 03/12/24 0146   BP:  144/100 104/67   Pulse: 94  77         Visual Acuity      ED Medications  Medications   sodium chloride 0.9 % bolus 1,000 mL (1,000 mL Intravenous New Bag 3/12/24 0253)   acetaminophen (TYLENOL) tablet 975 mg (has no administration in time range)   ondansetron (ZOFRAN) injection 4 mg (has no administration in time range)   pantoprazole (PROTONIX) injection 40 mg (has no administration in time range)   iohexol (OMNIPAQUE) 350 MG/ML injection (MULTI-DOSE) 100 mL (100 mL Intravenous Given 3/12/24 0248)       Diagnostic Studies  Results Reviewed       Procedure Component Value Units Date/Time    UA w Reflex to Microscopic w Reflex to Culture [[id number]]     Lab Status: No result Specimen: Urine     Comprehensive metabolic panel [[id number]]  (Abnormal) Collected: 03/11/24 2352    Lab Status: Final result Specimen: Blood from Arm, Right Updated: 03/12/24 0019     Sodium 138 mmol/L      Potassium 3.5 mmol/L      Chloride 104 mmol/L      CO2 22 mmol/L      ANION GAP 12 mmol/L      BUN 11 mg/dL      Creatinine 0.74 mg/dL      Glucose 125 mg/dL      Calcium 9.9 mg/dL      AST 46 U/L      ALT 42 U/L      Alkaline Phosphatase 92 U/L      Total Protein 7.8 g/dL      Albumin 4.6 g/dL      Total Bilirubin 0.78 mg/dL      eGFR 87 ml/min/1.73sq m     Narrative:      [employer] guidelines for Chronic Kidney Disease (CKD):   •  Stage 1 with normal or high GFR (GFR >  90 mL/min/1.73 square meters)  •  Stage 2 Mild CKD (GFR = 60-89 mL/min/1.73 square meters)  •  Stage 3A Moderate CKD (GFR = 45-59 mL/min/1.73 square meters)  •  Stage 3B Moderate CKD (GFR = 30-44 mL/min/1.73 square meters)  •  Stage 4 Severe CKD (GFR = 15-29 mL/min/1.73 square meters)  •  Stage 5 End Stage CKD (GFR <15 mL/min/1.73 square meters)  Note: GFR calculation is accurate only with a steady state creatinine    Lipase [[id number]]  (Normal) Collected: 03/11/24 2352    Lab Status: Final result Specimen: Blood from Arm, Right Updated: 03/12/24 0019     Lipase 31 u/L     CBC and differential [[id number]] Collected: 03/11/24 2352    Lab Status: Final result Specimen: Blood from Arm, Right Updated: 03/12/24 0006     WBC 7.87 Thousand/uL      RBC 4.67 Million/uL      Hemoglobin 13.8 g/dL      Hematocrit 42.2 %      MCV 90 fL      MCH 29.6 pg      MCHC 32.7 g/dL      RDW 13.2 %      MPV 10.4 fL      Platelets 316 Thousands/uL      nRBC 0 /100 WBCs      Neutrophils Relative 54 %      Immat GRANS % 1 %      Lymphocytes Relative 35 %      Monocytes Relative 8 %      Eosinophils Relative 1 %      Basophils Relative 1 %      Neutrophils Absolute 4.33 Thousands/µL      Immature Grans Absolute 0.04 Thousand/uL      Lymphocytes Absolute 2.76 Thousands/µL      Monocytes Absolute 0.61 Thousand/µL      Eosinophils Absolute 0.08 Thousand/µL      Basophils Absolute 0.05 Thousands/µL                    CT abdomen pelvis with contrast   Final Result by Jai Mcgee MD (03/12 0315)      Findings of epiploic appendagitis. Given presence near the bladder dome, this may cause urinary symptoms.         Workstation performed: [id number]                    Procedures  Procedures         ED Course  ED Course as of 03/12/24 0441   Tue Mar 12, 2024   0158 Patient seen by gastroenterology 3/6/2024, notes reviewed patient with abdominal bloating and abdominal pain likely functional in origin, gas bloat symptoms, on omeprazole currently.  Has  colonoscopy scheduled in April 0342 Patient with another bloody BM in the ER, no hemorrhoidal bleeding    0356 Contacted gastroenterology on-call, Dr. Duval for further disposition suggestions inpatient evaluation versus outpatient follow-up with Dr. Ibarra   0403 Discussed with GI, can likely be discharged with close outpatient follow-up with GI they will attempt to move up her colonoscopy however if patient have continued bleeding may be a candidate for admission, will discuss with patient   0432 No further GI bleeding, discussed with patient, she will call GI in the morning for follow up otherwise stable for discharge                                             Medical Decision Making  63-year-old female with an episode of blood in stool, currently no chest pain or shortness of breath, lab work to evaluate for anemia, thrombocytopenia, CT to evaluate for diverticulitis, colitis, enteritis UA to evaluate for urinary tract infection    Amount and/or Complexity of Data Reviewed  Labs: ordered.  Radiology: ordered.    Risk  OTC drugs.  Prescription drug management.             Disposition  Final diagnoses:   None     ED Disposition       None          Follow-up Information    None         Patient's Medications   Discharge Prescriptions    No medications on file       No discharge procedures on file.    PDMP Review       None            ED Provider  Electronically Signed by             Francesca Vladivia DO  03/12/24 9102     Overweight

## 2024-03-12 NOTE — H&P
"[facility]  H&P  Name: Pascale Morgan 63 y.o. female I MRN: [id number]  Unit/Bed#: OVR 03 I Date of Admission: 3/12/2024   Date of Service: 3/12/2024 I Hospital Day: 0      Assessment/Plan   * Rectal bleeding  Assessment & Plan  Patient admitted with multiple episodes of rectal bleeding and lower abdominal pain  CT abdomen pelvis-\"Findings of epiploic appendagitis. Given presence near the bladder dome, this may cause urinary symptoms.\"  Hemoglobin on admission is 14.1  Trend H&H every 8 hours  GI consult  Spoke with GI on-call and he recommended starting GoLytely and making patient n.p.o. after midnight for colonoscopy in a.m.  On Protonix  Avoid antiplatelet/anticoagulants    Gastroesophageal reflux disease without esophagitis  Assessment & Plan  Protonix    Acquired hypothyroidism  Assessment & Plan  Continue Synthroid        Chief Complaint   Patient presents with    GI Bleeding     Pt seen last night for same. Pt states she was told if she had continued bleeding, to come back. Pt again having rectal bleeding again today with LLQ pain.         HPI:  Pascale Morgan is a 63 y.o. female with past medical history of hypothyroidism who presented to emergency department with rectal bleeding.  Patient was seen in the ER early morning with 2 episodes of bloody bowel movement and had workup and was discharged with outpatient follow-up with GI.  Patient states that when she returned home she called her GI office and her colonoscopy was rescheduled for coming Monday.  Patient had 4 more episodes of rectal bleeding and came back to ER.  Patient complains of lower abdominal pain.  Patient states that she has been having abdominal pain for the last couple months and has associated nausea and has been following with GI as outpatient.  Denies any diarrhea, dizziness, headache, lightheadedness, chest pain, shortness of breath or any other complaint.  Discussed with GI on-call and they recommended to prep the " patient for colonoscopy in a.m.    Historical Information   Past Medical History:   Diagnosis Date    Anxiety     COVID-19     in march    Disease of thyroid gland      Past Surgical History:   Procedure Laterality Date    BREAST BIOPSY Right     benign    FRACTURE SURGERY Right     arm    WISDOM TOOTH EXTRACTION       Social History   Social History     Substance and Sexual Activity   Alcohol Use Yes    Alcohol/week: 2.0 standard drinks of alcohol    Types: 2 Glasses of wine per week     Social History     Substance and Sexual Activity   Drug Use No     Social History     Tobacco Use   Smoking Status Never   Smokeless Tobacco Never     Family History   Problem Relation Age of Onset    Dementia Mother     Heart failure Mother     Diabetes Mother     Cancer Father     Pancreatic cancer Father     Uterine cancer Sister 62    Dementia Sister     No Known Problems Sister     No Known Problems Sister     No Known Problems Maternal Grandmother     No Known Problems Maternal Grandfather     No Known Problems Paternal Grandmother     No Known Problems Paternal Grandfather     Colon cancer Neg Hx     Colon polyps Neg Hx        Meds/Allergies   Allergies   Allergen Reactions    Sulfa Antibiotics Anaphylaxis    Statins Hives       Meds:    Current Facility-Administered Medications:     acetaminophen (TYLENOL) tablet 650 mg, 650 mg, Oral, Q6H PRN, Pratik Orozco MD    [START ON 3/13/2024] levothyroxine tablet 100 mcg, 100 mcg, Oral, Early Morning, Pratik Orozco MD    ondansetron (ZOFRAN) injection 4 mg, 4 mg, Intravenous, Q6H PRN, Pratik Orozco MD    pantoprazole (PROTONIX) injection 40 mg, 40 mg, Intravenous, Q12H JESENIA, Pratik Orozco MD    polyethylene glycol (GOLYTELY) bowel prep 4,000 mL, 4,000 mL, Oral, Once, Pratik Orozco MD    Current Outpatient Medications:     levothyroxine 100 mcg tablet, Take 1 tablet (100 mcg total) by mouth daily, Disp: 90 tablet, Rfl: 0    pantoprazole (PROTONIX) 40 mg tablet,  Take 1 tablet (40 mg total) by mouth daily, Disp: 90 tablet, Rfl: 0    (Not in a hospital admission)        Review of Systems   Constitutional:  Positive for activity change.   HENT: Negative.     Eyes: Negative.    Respiratory: Negative.     Cardiovascular: Negative.    Gastrointestinal:  Positive for abdominal pain, blood in stool and nausea.   Endocrine: Negative.    Genitourinary: Negative.    Musculoskeletal: Negative.    Skin: Negative.    Allergic/Immunologic: Negative.    Neurological: Negative.    Hematological: Negative.    Psychiatric/Behavioral: Negative.         Current Vitals:   Blood Pressure: 118/77 (03/12/24 1453)  Pulse: 68 (03/12/24 1453)  Temperature: (!) 97.3 °F (36.3 °C) (03/12/24 1453)  Temp Source: Temporal (03/12/24 1453)  Respirations: 20 (03/12/24 1453)  SpO2: 94 % (03/12/24 1453)  SPO2 RA Rest      Flowsheet Row ED from 3/12/2024 in  [facility] Emergency Department   SpO2 94 %   SpO2 Activity At Rest   O2 Device None (Room air)   O2 Flow Rate --          No intake or output data in the 24 hours ending 03/12/24 1704  There is no height or weight on file to calculate BMI.     Physical Exam  Vitals and nursing note reviewed.   Constitutional:       General: She is not in acute distress.     Appearance: She is well-developed.   HENT:      Head: Normocephalic and atraumatic.      Nose: Nose normal.   Eyes:      General: No scleral icterus.     Conjunctiva/sclera: Conjunctivae normal.      Pupils: Pupils are equal, round, and reactive to light.   Neck:      Thyroid: No thyromegaly.      Vascular: No JVD.      Trachea: No tracheal deviation.   Cardiovascular:      Rate and Rhythm: Normal rate and regular rhythm.      Heart sounds: Normal heart sounds.   Pulmonary:      Effort: Pulmonary effort is normal. No respiratory distress.      Breath sounds: Normal breath sounds. No wheezing or rales.   Chest:      Chest wall: No tenderness.   Abdominal:      General: Bowel sounds are  normal. There is no distension.      Palpations: Abdomen is soft. There is no mass.      Tenderness: There is abdominal tenderness. There is no guarding or rebound.   Musculoskeletal:         General: No tenderness or deformity. Normal range of motion.      Cervical back: Normal range of motion and neck supple.   Lymphadenopathy:      Cervical: No cervical adenopathy.   Skin:     General: Skin is warm.      Coloration: Skin is not pale.      Findings: No erythema or rash.   Neurological:      General: No focal deficit present.      Mental Status: She is alert and oriented to person, place, and time. Mental status is at baseline.      Cranial Nerves: No cranial nerve deficit.      Coordination: Coordination normal.   Psychiatric:         Behavior: Behavior normal.         Thought Content: Thought content normal.         Judgment: Judgment normal.         Lab Results:   CBC:   Lab Results   Component Value Date    WBC 5.61 03/12/2024    HGB 14.1 03/12/2024    HCT 43.1 03/12/2024    MCV 92 03/12/2024     03/12/2024    RBC 4.71 03/12/2024    MCH 29.9 03/12/2024    MCHC 32.7 03/12/2024    RDW 13.2 03/12/2024    MPV 10.1 03/12/2024    NRBC 0 03/12/2024     CMP:  Lab Results   Component Value Date     12/30/2016     03/12/2024     02/09/2024    CO2 27 03/12/2024    CO2 23 02/09/2024    ANIONGAP 11 07/22/2015    BUN 8 03/12/2024    BUN 12 02/09/2024    CREATININE 0.82 03/12/2024    CREATININE 0.91 12/30/2016    GLUCOSE 132 (H) 12/30/2016    CALCIUM 9.9 03/12/2024    CALCIUM 9.7 12/30/2016    AST 52 (H) 03/12/2024    AST 46 (H) 02/09/2024    ALT 45 03/12/2024    ALT 43 (H) 02/09/2024    ALKPHOS 90 03/12/2024    ALKPHOS 100 12/30/2016    PROT 7.6 12/30/2016    BILITOT 0.8 12/30/2016    EGFR 76 03/12/2024     Lab Results   Component Value Date    TROPONINI <0.02 10/22/2021     Coagulation:   Lab Results   Component Value Date    INR 0.97 03/12/2024    Urinalysis:  Lab Results   Component Value Date     "COLORU Light Yellow 03/12/2024    COLORU Yellow 07/22/2015    CLARITYU Clear 03/12/2024    CLARITYU Slightly Cloudy 07/22/2015    SPECGRAV <1.005 (L) 03/12/2024    SPECGRAV >=1.030 07/22/2015    PHUR 6.5 03/12/2024    PHUR 5.5 07/22/2015    LEUKOCYTESUR Negative 03/12/2024    LEUKOCYTESUR Negative 07/22/2015    NITRITE Negative 03/12/2024    NITRITE Negative 07/22/2015    PROTEINUA Negative 07/22/2015    GLUCOSEU Negative 03/12/2024    GLUCOSEU Negative 07/22/2015    KETONESU Negative 03/12/2024    KETONESU Trace (A) 07/22/2015    BILIRUBINUR Negative 03/12/2024    BILIRUBINUR Negative 07/22/2015    BLOODU Negative 03/12/2024    BLOODU Trace (A) 07/22/2015      Amylase: No results found for: \"AMYLASE\"  Lipase:   Lab Results   Component Value Date    LIPASE 31 03/11/2024    LIPASE 203 07/22/2015        Imaging: CT abdomen pelvis with contrast    Result Date: 3/12/2024  Narrative: CT ABDOMEN AND PELVIS WITH IV CONTRAST INDICATION: abdominal pain. Bright red blood in stool COMPARISON: MRI 2/29/2024, CT 2/10/2024 TECHNIQUE: CT examination of the abdomen and pelvis was performed. Multiplanar 2D reformatted images were created from the source data. This examination, like all CT scans performed in the [facility], was performed utilizing techniques to minimize radiation dose exposure, including the use of iterative reconstruction and automated exposure control. Radiation dose length product (DLP) for this visit: 958.29 mGy-cm IV Contrast: 100 mL of iohexol (OMNIPAQUE) Enteric Contrast: Not administered. FINDINGS: ABDOMEN LOWER CHEST: No clinically significant abnormality in the visualized lower chest. LIVER/BILIARY TREE: Again noted is a 6 mm hyperenhancing right inferior lobe lesion series 2/80, consistent with previously described hemangioma GALLBLADDER: No calcified gallstones. No pericholecystic inflammatory change. SPLEEN: Unremarkable. PANCREAS: Unremarkable. ADRENAL GLANDS: Subcentimeter left " myelolipoma KIDNEYS/URETERS: 3 mm nonobstructing right midpole stone 1 mm nonobstructing left lower pole stone STOMACH AND BOWEL: Unremarkable. APPENDIX: Normal. ABDOMINOPELVIC CAVITY: There is a well-circumscribed lobule of omental/mesenteric fat noted near the bladder dome, with surrounding inflammatory stranding VESSELS: Unremarkable for patient's age. PELVIS REPRODUCTIVE ORGANS: Unremarkable for patient's age. URINARY BLADDER: Mild wall thickening at the dome is likely reactive from adjacent epiploic appendagitis ABDOMINAL WALL/INGUINAL REGIONS: Small umbilical BONES: No acute fracture or suspicious osseous lesion.     Impression: Findings of epiploic appendagitis. Given presence near the bladder dome, this may cause urinary symptoms. Workstation performed: [id number]     MRI abdomen w wo contrast    Result Date: 3/5/2024  Narrative: MRI - ABDOMEN - WITH AND WITHOUT CONTRAST INDICATION: 63 years / Female. K76.9: Liver disease, unspecified. COMPARISON: Abdominal ultrasound 2/11/2024. CTA chest abdomen pelvis 2/10/2024. CT abdomen pelvis 10/22/2021. TECHNIQUE: Multiplanar/multisequence MRI of the abdomen was performed before and after administration of contrast. IV Contrast: 9 mL of Gadobutrol injection (SINGLE-DOSE) FINDINGS: LOWER CHEST: Unremarkable. LIVER: Hepatomegaly. Severe hepatic steatosis. No suspicious mass. 7 mm focus of subcapsular arterial phase enhancement in segment 6 unchanged from the CT, #9/100 showing low B value restricted diffusion #7/28 and mild T2 hyperintensity #5/30. Hyperenhancement persists on 7-minute delayed images #11/103. This lesion is in keeping with a benign 1 flash filling hemangioma. Patent hepatic and portal veins. BILE DUCTS: No intrahepatic or extrahepatic bile duct dilation. GALLBLADDER: Normal PANCREAS: Unremarkable. ADRENAL GLANDS: Reidentified 10 mm x 5 mm left adrenal myelolipoma #2/14 and 801/55. SPLEEN: Unchanged spleen and adjacent splenule. KIDNEYS/PROXIMAL URETERS:  No hydroureteronephrosis. No suspicious renal mass. BOWEL: No dilated loops of bowel. PERITONEUM/RETROPERITONEUM: No ascites. LYMPH NODES: No abdominal lymphadenopathy. VESSELS: No aneurysm. ABDOMINAL WALL: Unremarkable BONES: No suspicious osseous lesion.     Impression: Hepatomegaly with severe hepatic steatosis. 7 mm hepatic segment 6 lesion in keeping with a benign flash filling hemangioma correlates to the indeterminate finding seen on comparison CT.. No suspicious hepatic lesions. Workstation performed: [id number]     US abdomen complete    Result Date: 2/15/2024  Narrative: ABDOMEN ULTRASOUND, COMPLETE INDICATION: R10.84: Generalized abdominal pain. COMPARISON: Abdominal ultrasound September 2021, CTA of the chest abdomen and pelvis 10/20/2024 TECHNIQUE: Real-time ultrasound of the abdomen was performed with a curvilinear transducer with both volumetric sweeps and still imaging techniques. FINDINGS: PANCREAS: Portions of the pancreas are obscured by bowel gas. Visualized portions of the pancreas are unremarkable. AORTA AND IVC: Visualized portions are normal for patient age. LIVER: Size: Within normal range. The liver measures 14.2 cm in the midclavicular line. Contour: Surface contour is smooth. Parenchyma: There is moderate to severe diffuse increased echogenicity with smooth echotexture and acoustic beam attenuation. Most consistent with moderate hepatic steatosis. No liver mass identified. No sonographic finding is seen to correspond to the hypervascular focus in the right lobe of the liver. Limited imaging of the main portal vein shows it to be patent and hepatopetal. BILIARY: No gallstones. Normal gallbladder wall thickness. No pericholecystic fluid or positive London sign. There is a gallbladder polyp measuring 3 mm.  It has a pedunculated ball-on-the-wall appearance. According to current consensus recommendations (SRU 2022; 000:1-12), for polyps of this size ( <=  9 mm) which have an extremely low  risk morphology, no follow-up is recommended. Reference: Management of Incidentally Detected Gallbladder Polyps: Society of Radiologists in Ultrasound Consensus Conference Recommendations. Radiology 2022; 000:1-12. https://pubs.rsna.org/doi/full/10.1148/radiol.279882 No intrahepatic biliary dilatation. CBD measures 5.0 mm. No choledocholithiasis. KIDNEY: Right kidney measures 11.5 x 5.5 x 4.6 cm. Volume 153.4 mL No hydronephrosis. 3 mm calculus in the interpolar region. Left kidney measures 11.4 x 4.9 x 4.4 cm. Volume 127.8 mL No hydronephrosis. 3 mm calculus in the lower pole. SPLEEN: Measures 11.3 x 11.5 x 4.5 cm. Volume 310.1 mL Within normal limits. ASCITES: None.     Impression: 1. Moderate to severe hepatic steatosis. No sonographic finding to correspond to the lesion seen on CT. Additional evaluation with MRI abdomen with contrast should be obtained as recommended on the CT report. 2. Gallbladder polyp which requires no follow-up. 3. Bilateral nonobstructing renal calculi. Study was marked in Epic for follow-up notification. Workstation performed: [id number]     US pelvis complete w transvaginal    Result Date: 2/15/2024  Narrative: PELVIC ULTRASOUND, COMPLETE INDICATION: The patient is 63 years old. R10.2: Pelvic and perineal pain. COMPARISON: CTA of the chest, abdomen and pelvis February 10, 2024, pelvic ultrasound January 23, 2017 TECHNIQUE: Transabdominal pelvic ultrasound was performed in sagittal and transverse planes with a curvilinear transducer. Additional transvaginal imaging was performed to better evaluate the endometrium and ovaries. Imaging included volumetric sweeps as  well as traditional still imaging technique. FINDINGS: UTERUS: The uterus is anteverted in position, measuring 6.1 x 1.8 x 3.5 cm. The uterus has a normal contour and echotexture. The cervix appears within normal limits. ENDOMETRIUM: The endometrial echo complex has an AP caliber of 1.0 mm. Its appearance is within normal  "limits for age and cycle and shows no filling defects. OVARIES/ADNEXA: Ovaries were not visualized. No mass or collection is seen in the adnexa. OTHER: No free fluid or loculated fluid collections.     Impression: Unremarkable uterus. Ovaries not visualized. Workstation performed: [id number]     EKG, Pathology, and Other Studies: I have personally reviewed the results.  VTE Pharmacologic Prophylaxis: Reason for no pharmacologic prophylaxis GI bleed  VTE Mechanical Prophylaxis: sequential compression device    Code Status: Level 1 - Full Code      Counseling / Coordination of Care  Total floor / unit time spent today 75 minutes.  Greater than 50% of total time was spent with the patient and / or family counseling and / or coordination of care.     \"This note has been constructed using a voice recognition system\"      Pratik Orozco MD  3/12/2024, 5:04 PM            "

## 2024-03-12 NOTE — TELEPHONE ENCOUNTER
"Regarding: Blood in Stool  ----- Message from Leanna Vega sent at 3/11/2024 10:22 PM EDT -----  \" I Started having Bloating, Pain in my lower Abdomen for the last 3 days then today when I went to the bathroom I saw Red Blood in my Stool twice.\"    "

## 2024-03-13 ENCOUNTER — APPOINTMENT (INPATIENT)
Dept: GASTROENTEROLOGY | Facility: HOSPITAL | Age: 64
DRG: 395 | End: 2024-03-13
Payer: COMMERCIAL

## 2024-03-13 ENCOUNTER — ANESTHESIA EVENT (INPATIENT)
Dept: GASTROENTEROLOGY | Facility: HOSPITAL | Age: 64
DRG: 395 | End: 2024-03-13
Payer: COMMERCIAL

## 2024-03-13 ENCOUNTER — ANESTHESIA (INPATIENT)
Dept: GASTROENTEROLOGY | Facility: HOSPITAL | Age: 64
DRG: 395 | End: 2024-03-13
Payer: COMMERCIAL

## 2024-03-13 VITALS
SYSTOLIC BLOOD PRESSURE: 92 MMHG | OXYGEN SATURATION: 94 % | RESPIRATION RATE: 15 BRPM | TEMPERATURE: 98.3 F | HEART RATE: 76 BPM | DIASTOLIC BLOOD PRESSURE: 54 MMHG

## 2024-03-13 LAB
ALBUMIN SERPL BCP-MCNC: 4.1 G/DL (ref 3.5–5)
ALP SERPL-CCNC: 76 U/L (ref 34–104)
ALT SERPL W P-5'-P-CCNC: 40 U/L (ref 7–52)
ANION GAP SERPL CALCULATED.3IONS-SCNC: 6 MMOL/L (ref 4–13)
AST SERPL W P-5'-P-CCNC: 45 U/L (ref 13–39)
BASOPHILS # BLD AUTO: 0.05 THOUSANDS/ÂΜL (ref 0–0.1)
BASOPHILS NFR BLD AUTO: 1 % (ref 0–1)
BILIRUB SERPL-MCNC: 0.83 MG/DL (ref 0.2–1)
BUN SERPL-MCNC: 7 MG/DL (ref 5–25)
CALCIUM SERPL-MCNC: 9.3 MG/DL (ref 8.4–10.2)
CHLORIDE SERPL-SCNC: 107 MMOL/L (ref 96–108)
CO2 SERPL-SCNC: 29 MMOL/L (ref 21–32)
CREAT SERPL-MCNC: 0.77 MG/DL (ref 0.6–1.3)
EOSINOPHIL # BLD AUTO: 0.11 THOUSAND/ÂΜL (ref 0–0.61)
EOSINOPHIL NFR BLD AUTO: 2 % (ref 0–6)
ERYTHROCYTE [DISTWIDTH] IN BLOOD BY AUTOMATED COUNT: 13.2 % (ref 11.6–15.1)
GFR SERPL CREATININE-BSD FRML MDRD: 83 ML/MIN/1.73SQ M
GLUCOSE SERPL-MCNC: 109 MG/DL (ref 65–140)
HCT VFR BLD AUTO: 39.5 % (ref 34.8–46.1)
HCT VFR BLD AUTO: 42.5 % (ref 34.8–46.1)
HGB BLD-MCNC: 12.6 G/DL (ref 11.5–15.4)
HGB BLD-MCNC: 13.5 G/DL (ref 11.5–15.4)
IMM GRANULOCYTES # BLD AUTO: 0.02 THOUSAND/UL (ref 0–0.2)
IMM GRANULOCYTES NFR BLD AUTO: 0 % (ref 0–2)
LYMPHOCYTES # BLD AUTO: 2.9 THOUSANDS/ÂΜL (ref 0.6–4.47)
LYMPHOCYTES NFR BLD AUTO: 46 % (ref 14–44)
MAGNESIUM SERPL-MCNC: 2 MG/DL (ref 1.9–2.7)
MCH RBC QN AUTO: 29.1 PG (ref 26.8–34.3)
MCHC RBC AUTO-ENTMCNC: 31.9 G/DL (ref 31.4–37.4)
MCV RBC AUTO: 91 FL (ref 82–98)
MONOCYTES # BLD AUTO: 0.53 THOUSAND/ÂΜL (ref 0.17–1.22)
MONOCYTES NFR BLD AUTO: 9 % (ref 4–12)
NEUTROPHILS # BLD AUTO: 2.63 THOUSANDS/ÂΜL (ref 1.85–7.62)
NEUTS SEG NFR BLD AUTO: 42 % (ref 43–75)
NRBC BLD AUTO-RTO: 0 /100 WBCS
PHOSPHATE SERPL-MCNC: 3.9 MG/DL (ref 2.3–4.1)
PLATELET # BLD AUTO: 284 THOUSANDS/UL (ref 149–390)
PMV BLD AUTO: 10.2 FL (ref 8.9–12.7)
POTASSIUM SERPL-SCNC: 3.5 MMOL/L (ref 3.5–5.3)
PROT SERPL-MCNC: 6.9 G/DL (ref 6.4–8.4)
RBC # BLD AUTO: 4.33 MILLION/UL (ref 3.81–5.12)
SODIUM SERPL-SCNC: 142 MMOL/L (ref 135–147)
WBC # BLD AUTO: 6.24 THOUSAND/UL (ref 4.31–10.16)

## 2024-03-13 PROCEDURE — C9113 INJ PANTOPRAZOLE SODIUM, VIA: HCPCS | Performed by: INTERNAL MEDICINE

## 2024-03-13 PROCEDURE — 85014 HEMATOCRIT: CPT | Performed by: INTERNAL MEDICINE

## 2024-03-13 PROCEDURE — 0DBN8ZX EXCISION OF SIGMOID COLON, VIA NATURAL OR ARTIFICIAL OPENING ENDOSCOPIC, DIAGNOSTIC: ICD-10-PCS | Performed by: INTERNAL MEDICINE

## 2024-03-13 PROCEDURE — 85025 COMPLETE CBC W/AUTO DIFF WBC: CPT | Performed by: INTERNAL MEDICINE

## 2024-03-13 PROCEDURE — 83735 ASSAY OF MAGNESIUM: CPT | Performed by: INTERNAL MEDICINE

## 2024-03-13 PROCEDURE — 84100 ASSAY OF PHOSPHORUS: CPT | Performed by: INTERNAL MEDICINE

## 2024-03-13 PROCEDURE — 85018 HEMOGLOBIN: CPT | Performed by: INTERNAL MEDICINE

## 2024-03-13 PROCEDURE — 99239 HOSP IP/OBS DSCHRG MGMT >30: CPT | Performed by: INTERNAL MEDICINE

## 2024-03-13 PROCEDURE — 88305 TISSUE EXAM BY PATHOLOGIST: CPT | Performed by: PATHOLOGY

## 2024-03-13 PROCEDURE — 80053 COMPREHEN METABOLIC PANEL: CPT | Performed by: INTERNAL MEDICINE

## 2024-03-13 RX ORDER — POTASSIUM CHLORIDE 14.9 MG/ML
20 INJECTION INTRAVENOUS ONCE
Status: DISCONTINUED | OUTPATIENT
Start: 2024-03-13 | End: 2024-03-13

## 2024-03-13 RX ORDER — SODIUM CHLORIDE 9 MG/ML
INJECTION, SOLUTION INTRAVENOUS CONTINUOUS PRN
Status: DISCONTINUED | OUTPATIENT
Start: 2024-03-13 | End: 2024-03-13

## 2024-03-13 RX ORDER — PROPOFOL 10 MG/ML
INJECTION, EMULSION INTRAVENOUS AS NEEDED
Status: DISCONTINUED | OUTPATIENT
Start: 2024-03-13 | End: 2024-03-13

## 2024-03-13 RX ORDER — LIDOCAINE HYDROCHLORIDE 10 MG/ML
INJECTION, SOLUTION EPIDURAL; INFILTRATION; INTRACAUDAL; PERINEURAL AS NEEDED
Status: DISCONTINUED | OUTPATIENT
Start: 2024-03-13 | End: 2024-03-13

## 2024-03-13 RX ORDER — POTASSIUM CHLORIDE 20 MEQ/1
40 TABLET, EXTENDED RELEASE ORAL ONCE
Qty: 2 TABLET | Refills: 0 | Status: COMPLETED | OUTPATIENT
Start: 2024-03-13 | End: 2024-03-13

## 2024-03-13 RX ORDER — POTASSIUM CHLORIDE 14.9 MG/ML
20 INJECTION INTRAVENOUS ONCE
Qty: 100 ML | Refills: 0 | Status: DISCONTINUED | OUTPATIENT
Start: 2024-03-13 | End: 2024-03-13 | Stop reason: SDUPTHER

## 2024-03-13 RX ORDER — PROPOFOL 10 MG/ML
INJECTION, EMULSION INTRAVENOUS CONTINUOUS PRN
Status: DISCONTINUED | OUTPATIENT
Start: 2024-03-13 | End: 2024-03-13

## 2024-03-13 RX ADMIN — SODIUM CHLORIDE: 0.9 INJECTION, SOLUTION INTRAVENOUS at 11:19

## 2024-03-13 RX ADMIN — PROPOFOL 20 MG: 10 INJECTION, EMULSION INTRAVENOUS at 11:32

## 2024-03-13 RX ADMIN — PROPOFOL 100 MG: 10 INJECTION, EMULSION INTRAVENOUS at 11:22

## 2024-03-13 RX ADMIN — ACETAMINOPHEN 650 MG: 325 TABLET, FILM COATED ORAL at 07:25

## 2024-03-13 RX ADMIN — PROPOFOL 30 MG: 10 INJECTION, EMULSION INTRAVENOUS at 11:24

## 2024-03-13 RX ADMIN — PROPOFOL 100 MCG/KG/MIN: 10 INJECTION, EMULSION INTRAVENOUS at 11:22

## 2024-03-13 RX ADMIN — LIDOCAINE HYDROCHLORIDE 50 MG: 10 INJECTION, SOLUTION EPIDURAL; INFILTRATION; INTRACAUDAL; PERINEURAL at 11:21

## 2024-03-13 RX ADMIN — PANTOPRAZOLE SODIUM 40 MG: 40 INJECTION, POWDER, FOR SOLUTION INTRAVENOUS at 07:25

## 2024-03-13 RX ADMIN — ACETAMINOPHEN 650 MG: 325 TABLET, FILM COATED ORAL at 12:36

## 2024-03-13 RX ADMIN — PROPOFOL 20 MG: 10 INJECTION, EMULSION INTRAVENOUS at 11:27

## 2024-03-13 RX ADMIN — POTASSIUM CHLORIDE 40 MEQ: 1500 TABLET, EXTENDED RELEASE ORAL at 13:53

## 2024-03-13 NOTE — PLAN OF CARE
Problem: PAIN - ADULT  Goal: Verbalizes/displays adequate comfort level or baseline comfort level  Description: Interventions:  - Encourage patient to monitor pain and request assistance  - Assess pain using appropriate pain scale  - Administer analgesics based on type and severity of pain and evaluate response  - Implement non-pharmacological measures as appropriate and evaluate response  - Consider cultural and social influences on pain and pain management  - Notify physician/advanced practitioner if interventions unsuccessful or patient reports new pain  Outcome: Progressing     Problem: INFECTION - ADULT  Goal: Absence or prevention of progression during hospitalization  Description: INTERVENTIONS:  - Assess and monitor for signs and symptoms of infection  - Monitor lab/diagnostic results  - Monitor all insertion sites, i.e. indwelling lines, tubes, and drains  - Monitor endotracheal if appropriate and nasal secretions for changes in amount and color  - [place] appropriate cooling/warming therapies per order  - Administer medications as ordered  - Instruct and encourage patient and family to use good hand hygiene technique  - Identify and instruct in appropriate isolation precautions for identified infection/condition  Outcome: Progressing  Goal: Absence of fever/infection during neutropenic period  Description: INTERVENTIONS:  - Monitor WBC    Outcome: Progressing     Problem: SAFETY ADULT  Goal: Patient will remain free of falls  Description: INTERVENTIONS:  - Educate patient/family on patient safety including physical limitations  - Instruct patient to call for assistance with activity   - Consult OT/PT to assist with strengthening/mobility   - Keep Call bell within reach  - Keep bed low and locked with side rails adjusted as appropriate  - Keep care items and personal belongings within reach  - Initiate and maintain comfort rounds  - Make Fall Risk Sign visible to staff  - Offer Toileting every 2 Hours,  in advance of need  - Obtain necessary fall risk management equipment  - Apply yellow socks and bracelet for high fall risk patients  - Consider moving patient to room near nurses station  Outcome: Progressing  Goal: Maintain or return to baseline ADL function  Description: INTERVENTIONS:  -  Assess patient's ability to carry out ADLs; assess patient's baseline for ADL function and identify physical deficits which impact ability to perform ADLs (bathing, care of mouth/teeth, toileting, grooming, dressing, etc.)  - Assess/evaluate cause of self-care deficits   - Assess range of motion  - Assess patient's mobility; develop plan if impaired  - Assess patient's need for assistive devices and provide as appropriate  - Encourage maximum independence but intervene and supervise when necessary  - Involve family in performance of ADLs  - Assess for home care needs following discharge   - Consider OT consult to assist with ADL evaluation and planning for discharge  - Provide patient education as appropriate  Outcome: Progressing  Goal: Maintains/Returns to pre admission functional level  Description: INTERVENTIONS:  - Perform AM-PAC 6 Click Basic Mobility/ Daily Activity assessment daily.  - Set and communicate daily mobility goal to care team and patient/family/caregiver.   - Collaborate with rehabilitation services on mobility goals if consulted  - Perform Range of Motion 3 times a day.  - Reposition patient every 2 hours.  - Dangle patient 3 times a day  - Stand patient 3 times a day  - Ambulate patient 3 times a day  - Out of bed to chair 3 times a day   - Out of bed for meals 3 times a day  - Out of bed for toileting  - Record patient progress and toleration of activity level   Outcome: Progressing     Problem: DISCHARGE PLANNING  Goal: Discharge to home or other facility with appropriate resources  Description: INTERVENTIONS:  - Identify barriers to discharge w/patient and caregiver  - Arrange for needed discharge  resources and transportation as appropriate  - Identify discharge learning needs (meds, wound care, etc.)  - Arrange for interpretive services to assist at discharge as needed  - Refer to Case Management Department for coordinating discharge planning if the patient needs post-hospital services based on physician/advanced practitioner order or complex needs related to functional status, cognitive ability, or social support system  Outcome: Progressing     Problem: Knowledge Deficit  Goal: Patient/family/caregiver demonstrates understanding of disease process, treatment plan, medications, and discharge instructions  Description: Complete learning assessment and assess knowledge base.  Interventions:  - Provide teaching at level of understanding  - Provide teaching via preferred learning methods  Outcome: Progressing

## 2024-03-13 NOTE — QUICK NOTE
Brief colonoscopy report    IMPRESSION:  Medium hemorrhoids  Subcentimeter polyp in the sigmoid colon was removed with cold forceps biopsy        RECOMMENDATION:  Resume regular diet and medications  Okay to discharge from GI standpoint  I will call with polyp pathology in 1 to 2 weeks if the polyp is an adenoma should have repeat colonoscopy in 7 years    Jhon Jones MD

## 2024-03-13 NOTE — ANESTHESIA PREPROCEDURE EVALUATION
Procedure:  COLONOSCOPY    Relevant Problems   ANESTHESIA (within normal limits)      CARDIO   (+) Hypercholesteremia      ENDO   (+) Acquired hypothyroidism   (+) New onset type 2 diabetes mellitus (HCC)      GI/HEPATIC  Hx gastroparesis with food in stomach on prior EGD   (+) Gastroesophageal reflux disease without esophagitis   (+) Rectal bleeding      NEURO/PSYCH   (+) Anxiety      PULMONARY   (+) Obstructive sleep apnea syndrome (Using CPAP)   (-) Smoking   (-) URI (upper respiratory infection)      Endocrine   (+) Impaired fasting glucose    BMI 35    Physical Exam    Airway    Mallampati score: II  TM Distance: >3 FB  Neck ROM: full     Dental   No notable dental hx     Cardiovascular      Pulmonary      Other Findings  post-pubertal.     Lab Results   Component Value Date    WBC 6.24 03/13/2024    HGB 12.6 03/13/2024     03/13/2024     Lab Results   Component Value Date    SODIUM 142 03/13/2024    K 3.5 03/13/2024    BUN 7 03/13/2024    CREATININE 0.77 03/13/2024    EGFR 83 03/13/2024    GLUCOSE 132 (H) 12/30/2016     Lab Results   Component Value Date    HGBA1C 6.8 (H) 02/09/2024     Anesthesia Plan  ASA Score- 2     Anesthesia Type- IV sedation with anesthesia with ASA Monitors.         Additional Monitors:     Airway Plan:            Plan Factors-Exercise tolerance (METS): >4 METS.    Chart reviewed.   Existing labs reviewed. Patient summary reviewed.    Patient is not a current smoker.      Obstructive sleep apnea risk education given perioperatively.        Induction- intravenous.    Postoperative Plan-     Informed Consent- Anesthetic plan and risks discussed with patient.  I personally reviewed this patient with the CRNA. Discussed and agreed on the Anesthesia Plan with the CRNA..

## 2024-03-13 NOTE — ANESTHESIA POSTPROCEDURE EVALUATION
Post-Op Assessment Note    CV Status:  Stable  Pain Score: 0    Pain management: adequate       Mental Status:  Alert and awake   Hydration Status:  Euvolemic   PONV Controlled:  Controlled   Airway Patency:  Patent     Post Op Vitals Reviewed: Yes    No anethesia notable event occurred.    Staff: CRNA               BP 94/55 (03/13/24 1146)    Temp 97.6 °F (36.4 °C) (03/13/24 1146)    Pulse 78 (03/13/24 1146)   Resp (!) 25 (03/13/24 1146)    SpO2 93 % (03/13/24 1146)

## 2024-03-13 NOTE — ED PROVIDER NOTES
History  Chief Complaint   Patient presents with    GI Bleeding     Pt seen last night for same. Pt states she was told if she had continued bleeding, to come back. Pt again having rectal bleeding again today with LLQ pain.      63-year-old female presents for evaluation of continued rectal bleeding.  Patient was evaluated here last night for similar symptoms.  Labs and CT abdomen pelvis were performed in discussion with gastroenterology team, close outpatient follow-up and strict return precautions.  Patient states she went home and continued to have bright red blood per rectum.  Associated left lower quadrant abdominal pain.  No rectal pain.        Prior to Admission Medications   Prescriptions Last Dose Informant Patient Reported? Taking?   levothyroxine 100 mcg tablet 3/12/2024 Self No Yes   Sig: Take 1 tablet (100 mcg total) by mouth daily   pantoprazole (PROTONIX) 40 mg tablet 3/12/2024 Self No Yes   Sig: Take 1 tablet (40 mg total) by mouth daily      Facility-Administered Medications: None       Past Medical History:   Diagnosis Date    Anxiety     COVID-19     in march    Disease of thyroid gland        Past Surgical History:   Procedure Laterality Date    BREAST BIOPSY Right     benign    FRACTURE SURGERY Right     arm    WISDOM TOOTH EXTRACTION         Family History   Problem Relation Age of Onset    Dementia Mother     Heart failure Mother     Diabetes Mother     Cancer Father     Pancreatic cancer Father     Uterine cancer Sister 62    Dementia Sister     No Known Problems Sister     No Known Problems Sister     No Known Problems Maternal Grandmother     No Known Problems Maternal Grandfather     No Known Problems Paternal Grandmother     No Known Problems Paternal Grandfather     Colon cancer Neg Hx     Colon polyps Neg Hx      I have reviewed and agree with the history as documented.    E-Cigarette/Vaping    E-Cigarette Use Never User      E-Cigarette/Vaping Substances    Nicotine No     THC No      CBD No     Flavoring No     Other No     Unknown No      Social History     Tobacco Use    Smoking status: Never    Smokeless tobacco: Never   Vaping Use    Vaping status: Never Used   Substance Use Topics    Alcohol use: Yes     Alcohol/week: 2.0 standard drinks of alcohol     Types: 2 Glasses of wine per week     Comment: socially    Drug use: No       Review of Systems   Constitutional:  Negative for fever.   Gastrointestinal:  Positive for abdominal pain and blood in stool.       Physical Exam  Physical Exam  Vitals and nursing note reviewed.   Constitutional:       Appearance: She is well-developed.   HENT:      Head: Normocephalic and atraumatic.      Right Ear: External ear normal.      Left Ear: External ear normal.      Nose: Nose normal.   Eyes:      General: No scleral icterus.  Cardiovascular:      Rate and Rhythm: Normal rate.   Pulmonary:      Effort: Pulmonary effort is normal. No respiratory distress.   Abdominal:      General: There is no distension.      Tenderness: There is abdominal tenderness.   Musculoskeletal:         General: No deformity. Normal range of motion.      Cervical back: Normal range of motion.   Skin:     Findings: No rash.   Neurological:      General: No focal deficit present.      Mental Status: She is alert and oriented to person, place, and time.   Psychiatric:         Mood and Affect: Mood normal.         Vital Signs  ED Triage Vitals [03/12/24 1453]   Temperature Pulse Respirations Blood Pressure SpO2   (!) 97.3 °F (36.3 °C) 68 20 118/77 94 %      Temp Source Heart Rate Source Patient Position - Orthostatic VS BP Location FiO2 (%)   Temporal Monitor Sitting Right arm --      Pain Score       5           Vitals:    03/12/24 1453 03/12/24 1714 03/12/24 1908   BP: 118/77 141/67 114/65   Pulse: 68 71 77   Patient Position - Orthostatic VS: Sitting Sitting          Visual Acuity      ED Medications  Medications   levothyroxine tablet 100 mcg (has no administration in time  range)   acetaminophen (TYLENOL) tablet 650 mg (has no administration in time range)   ondansetron (ZOFRAN) injection 4 mg (has no administration in time range)   pantoprazole (PROTONIX) injection 40 mg (40 mg Intravenous Given 3/12/24 2036)   polyethylene glycol (GOLYTELY) bowel prep 4,000 mL (4,000 mL Oral Given 3/12/24 1740)       Diagnostic Studies  Results Reviewed       Procedure Component Value Units Date/Time    UA w Reflex to Microscopic w Reflex to Culture [[id number]] Collected: 03/12/24 2130    Lab Status: Final result Specimen: Urine, Other Updated: 03/12/24 2140     Color, UA Light Yellow     Clarity, UA Clear     Specific Gravity, UA 1.010     pH, UA 6.5     Leukocytes, UA Negative     Nitrite, UA Negative     Protein, UA Negative mg/dl      Glucose, UA Negative mg/dl      Ketones, UA Negative mg/dl      Urobilinogen, UA <2.0 mg/dl      Bilirubin, UA Negative     Occult Blood, UA Negative    Hemoglobin and hematocrit, blood [[id number]]     Lab Status: No result Specimen: Blood     Comprehensive metabolic panel [[id number]]  (Abnormal) Collected: 03/12/24 1550    Lab Status: Final result Specimen: Blood from Arm, Left Updated: 03/12/24 1613     Sodium 139 mmol/L      Potassium 3.7 mmol/L      Chloride 104 mmol/L      CO2 27 mmol/L      ANION GAP 8 mmol/L      BUN 8 mg/dL      Creatinine 0.82 mg/dL      Glucose 127 mg/dL      Calcium 9.9 mg/dL      AST 52 U/L      ALT 45 U/L      Alkaline Phosphatase 90 U/L      Total Protein 7.9 g/dL      Albumin 4.6 g/dL      Total Bilirubin 0.97 mg/dL      eGFR 76 ml/min/1.73sq m     Narrative:      National Kid[employer] Foundation guidelines for Chronic Kidney Disease (CKD):     Stage 1 with normal or high GFR (GFR > 90 mL/min/1.73 square meters)    Stage 2 Mild CKD (GFR = 60-89 mL/min/1.73 square meters)    Stage 3A Moderate CKD (GFR = 45-59 mL/min/1.73 square meters)    Stage 3B Moderate CKD (GFR = 30-44 mL/min/1.73 square meters)    Stage 4 Severe CKD (GFR =  15-29 mL/min/1.73 square meters)    Stage 5 End Stage CKD (GFR <15 mL/min/1.73 square meters)  Note: GFR calculation is accurate only with a steady state creatinine    Protime-INR [[id number]]  (Normal) Collected: 03/12/24 1550    Lab Status: Final result Specimen: Blood from Arm, Left Updated: 03/12/24 1609     Protime 13.3 seconds      INR 0.97    APTT [[id number]]  (Normal) Collected: 03/12/24 1550    Lab Status: Final result Specimen: Blood from Arm, Left Updated: 03/12/24 1609     PTT 26 seconds     CBC and differential [[id number]] Collected: 03/12/24 1550    Lab Status: Final result Specimen: Blood from Arm, Left Updated: 03/12/24 1556     WBC 5.61 Thousand/uL      RBC 4.71 Million/uL      Hemoglobin 14.1 g/dL      Hematocrit 43.1 %      MCV 92 fL      MCH 29.9 pg      MCHC 32.7 g/dL      RDW 13.2 %      MPV 10.1 fL      Platelets 310 Thousands/uL      nRBC 0 /100 WBCs      Neutrophils Relative 55 %      Immature Grans % 1 %      Lymphocytes Relative 33 %      Monocytes Relative 9 %      Eosinophils Relative 1 %      Basophils Relative 1 %      Neutrophils Absolute 3.16 Thousands/µL      Absolute Immature Grans 0.03 Thousand/uL      Absolute Lymphocytes 1.83 Thousands/µL      Absolute Monocytes 0.49 Thousand/µL      Eosinophils Absolute 0.05 Thousand/µL      Basophils Absolute 0.05 Thousands/µL                    No orders to display              Procedures  Procedures         ED Course                               SBIRT 22yo+      Flowsheet Row Most Recent Value   Initial Alcohol Screen: US AUDIT-C     1. How often do you have a drink containing alcohol? 0 Filed at: 03/12/2024 1604   2. How many drinks containing alcohol do you have on a typical day you are drinking?  0 Filed at: 03/12/2024 1604   3a. Male UNDER 65: How often do you have five or more drinks on one occasion? 0 Filed at: 03/12/2024 1604   3b. FEMALE Any Age, or MALE 65+: How often do you have 4 or more drinks on one occassion? 0 Filed at:  03/12/2024 1604   Audit-C Score 0 Filed at: 03/12/2024 1604   JONI: How many times in the past year have you...    Used an illegal drug or used a prescription medication for non-medical reasons? Never Filed at: 03/12/2024 1604                      Medical Decision Making  63-year-old female presenting with continued GI bleed.  Repeat labs.  Hemoglobin stable.  Admit for further evaluation.    Amount and/or Complexity of Data Reviewed  Labs: ordered.    Risk  Decision regarding hospitalization.             Disposition  Final diagnoses:   GI bleed     Time reflects when diagnosis was documented in both MDM as applicable and the Disposition within this note       Time User Action Codes Description Comment    3/12/2024  4:06 PM Waqas Bursn [K92.2] GI bleed           ED Disposition       ED Disposition   Admit    Condition   Stable    Date/Time   Tue Mar 12, 2024 1606    Comment   Case was discussed with ANITA and the patient's admission status was agreed to be Admission Status: inpatient status to the service of Dr. Orozco .               Follow-up Information    None         Current Discharge Medication List        CONTINUE these medications which have NOT CHANGED    Details   levothyroxine 100 mcg tablet Take 1 tablet (100 mcg total) by mouth daily  Qty: 90 tablet, Refills: 0    Associated Diagnoses: Hypothyroidism, unspecified type      pantoprazole (PROTONIX) 40 mg tablet Take 1 tablet (40 mg total) by mouth daily  Qty: 90 tablet, Refills: 0    Associated Diagnoses: Generalized abdominal pain             No discharge procedures on file.    PDMP Review         Value Time User    PDMP Reviewed  Yes 3/12/2024  4:51 PM Pratik Orozco MD            ED Provider  Electronically Signed by             Waqas Burns DO  03/12/24 1698

## 2024-03-13 NOTE — DISCHARGE SUMMARY
"Formerly [facility]  Discharge- Pascale Morgan 1960, 63 y.o. female MRN: [id number]  Unit/Bed#: -01 Encounter: [id number]  Primary Care Provider: BRIDGER Jacob   Date and time admitted to hospital: 3/12/2024  3:29 PM    * Rectal bleeding  Assessment & Plan  Patient admitted with multiple episodes of rectal bleeding and lower abdominal pain  CT abdomen pelvis-\"Findings of epiploic appendagitis. Given presence near the bladder dome, this may cause urinary symptoms.\"  Hemoglobin on admission is 14.1  Hemoglobin this morning is 12.6 and stable  GI consult cruciated  Patient underwent colonoscopy which showed medium hemorrhoids  Subcentimeter polyp in the sigmoid colon was removed with cold forceps biopsy  No active bleeding  Was started on diet and tolerating well.  Patient is cleared by GI for discharge with outpatient follow-up    Gastroesophageal reflux disease without esophagitis  Assessment & Plan  Protonix    Acquired hypothyroidism  Assessment & Plan  Continue Synthroid      Hospital Course:     Pascale Morgan is a 63 y.o. female patient who originally presented to the hospital on   Admission Orders (From admission, onward)       Ordered        03/12/24 1615  INPATIENT ADMISSION  Once                         due to  rectal bleeding.  Patient was seen in the ER early morning with 2 episodes of bloody bowel movement and had workup and was discharged with outpatient follow-up with GI.  Patient states that when she returned home she called her GI office and her colonoscopy was rescheduled for coming Monday.  Patient had 4 more episodes of rectal bleeding and came back to ER.  Patient complains of lower abdominal pain.  Patient states that she has been having abdominal pain for the last couple months and has associated nausea and has been following with GI as outpatient   Patient underwent colonoscopy and was cleared by GI for discharge with outpatient follow-up with them  Patient is " hemodynamically stable for discharge  On Exam-  Chest-bilateral air entry, clear to auscultation  Abdomen-soft, nontender  Heart-S1 S2 regular  Extremities-no pedal edema or calf tenderness  Neuro-alert awake oriented x3.  No focal deficits    Please see above list of diagnoses and related plan for additional information.   Follow-up with PCP and GI as outpatient    Condition at Discharge:  good      Discharge instructions/Information to patient and family:   See after visit summary for information provided to patient and family.      Provisions for Follow-Up Care:  See after visit summary for information related to follow-up care and any pertinent home health orders.      Disposition:     Home       Discharge Statement:  I spent 40 minutes discharging the patient. This time was spent on the day of discharge. I had direct contact with the patient on the day of discharge. Greater than 50% of the total time was spent examining patient, answering all patient questions, arranging and discussing plan of care with patient as well as directly providing post-discharge instructions.  Additional time then spent on discharge activities.    Discharge Medications:  See after visit summary for reconciled discharge medications provided to patient and family.      ** Please Note: This note has been constructed using a voice recognition system **

## 2024-03-13 NOTE — UTILIZATION REVIEW
Initial Clinical Review    Admission: Date/Time/Statement:   Admission Orders (From admission, onward)       Ordered        03/12/24 1615  INPATIENT ADMISSION  Once                          Orders Placed This Encounter   Procedures    INPATIENT ADMISSION     Standing Status:   Standing     Number of Occurrences:   1     Order Specific Question:   Level of Care     Answer:   Med Surg [16]     Order Specific Question:   Estimated length of stay     Answer:   More than 2 Midnights     Order Specific Question:   Certification     Answer:   I certify that inpatient services are medically necessary for this patient for a duration of greater than two midnights. See H&P and MD Progress Notes for additional information about the patient's course of treatment.     ED Arrival Information       Expected   3/12/2024 12:00    Arrival   3/12/2024 14:33    Acuity   Urgent              Means of arrival   Walk-In    Escorted by   Self    Service   Hospitalist    Admission type   Emergency              Arrival complaint   Rectal Bleeding             Chief Complaint   Patient presents with    GI Bleeding     Pt seen last night for same. Pt states she was told if she had continued bleeding, to come back. Pt again having rectal bleeding again today with LLQ pain.        Initial Presentation: 63 y.o. female presents to ED from home with rectal bleeding.   Patient was seen in the ER early morning with 2 episodes of bloody bowel movement and had workup and was discharged with outpatient follow-up with GI.   Patient states that when she returned home she called her GI office and her colonoscopy was rescheduled for coming Monday.   Had  4 more episodes of rectal bleeding and returned to ED.  Hemoglobin 14.1. Complains  of lower abdominal pain for past few months with nausea, has  followed  Op with GI.  PMH is  GERD and  hypothyroidism.Admit  Ip with  Rectal bleeding and plan is  GI consult, monitor labs,   H/H Q 8 hrs, protonix and avoid  AC.        Date:  3/13   Day 2:   GI consult  Rectal bleeding started about  2 weeks ago,  increased over the past  2 days. Has increased hematochezia  and increased blood in toilet.   Denies  abdominal tenderness on exam.  Hemoglobin now  12.6.   No overt bleeding noted.  Plan colonoscopy  this am.      3/13  Colonoscopy  Medium hemorrhoids  Subcentimeter  polyp  in sigmoid colon removed    Resume regular diet.    ED Triage Vitals [03/12/24 1453]   Temperature Pulse Respirations Blood Pressure SpO2   (!) 97.3 °F (36.3 °C) 68 20 118/77 94 %      Temp Source Heart Rate Source Patient Position - Orthostatic VS BP Location FiO2 (%)   Temporal Monitor Sitting Right arm --      Pain Score       5          Wt Readings from Last 1 Encounters:   03/11/24 90.7 kg (200 lb)     Additional Vital Signs:   97.9 °F (36.6 °C) 67 -- 110/65 80 94 % -- --    03/12/24 19:08:15 98.6 °F (37 °C) 77 -- 114/65 81 93 % -- --   03/12/24 1714 -- 71 16 141/67 -- 97 % None (Room air) Sitting   03/12/24 1557 -- -- -- -- -- -- None (Room air) --   03/12/24 1453 97.3 °F (36.3 °C) Abnormal  68 20 118/77 -- 94 % None (Room air) Sitting     Pertinent Labs/Diagnostic Test Results:       Results from last 7 days   Lab Units 03/13/24  0446 03/13/24  0010 03/12/24  1550 03/11/24  2352   WBC Thousand/uL 6.24  --  5.61 7.87   HEMOGLOBIN g/dL 12.6 13.5 14.1 13.8   HEMATOCRIT % 39.5 42.5 43.1 42.2   PLATELETS Thousands/uL 284  --  310 316   NEUTROS ABS Thousands/µL 2.63  --  3.16 4.33         Results from last 7 days   Lab Units 03/13/24  0446 03/12/24  1550 03/11/24  2352   SODIUM mmol/L 142 139 138   POTASSIUM mmol/L 3.5 3.7 3.5   CHLORIDE mmol/L 107 104 104   CO2 mmol/L 29 27 22   ANION GAP mmol/L 6 8 12   BUN mg/dL 7 8 11   CREATININE mg/dL 0.77 0.82 0.74   EGFR ml/min/1.73sq m 83 76 87   CALCIUM mg/dL 9.3 9.9 9.9   MAGNESIUM mg/dL 2.0  --   --    PHOSPHORUS mg/dL 3.9  --   --      Results from last 7 days   Lab Units 03/13/24  0446 03/12/24  1630  03/11/24  2352   AST U/L 45* 52* 46*   ALT U/L 40 45 42   ALK PHOS U/L 76 90 92   TOTAL PROTEIN g/dL 6.9 7.9 7.8   ALBUMIN g/dL 4.1 4.6 4.6   TOTAL BILIRUBIN mg/dL 0.83 0.97 0.78         Results from last 7 days   Lab Units 03/13/24  0446 03/12/24  1550 03/11/24  2352   GLUCOSE RANDOM mg/dL 109 127 125           Results from last 7 days   Lab Units 03/12/24  1550   PROTIME seconds 13.3   INR  0.97   PTT seconds 26               Results from last 7 days   Lab Units 03/11/24  2352   LIPASE u/L 31                 Results from last 7 days   Lab Units 03/12/24  2130 03/12/24  0334   CLARITY UA  Clear Clear   COLOR UA  Light Yellow Light Yellow   SPEC GRAV UA  1.010 <1.005*   PH UA  6.5 6.5   GLUCOSE UA mg/dl Negative Negative   KETONES UA mg/dl Negative Negative   BLOOD UA  Negative Negative   PROTEIN UA mg/dl Negative 30 (1+)*   NITRITE UA  Negative Negative   BILIRUBIN UA  Negative Negative   UROBILINOGEN UA (BE) mg/dl <2.0 <2.0   LEUKOCYTES UA  Negative Negative   WBC UA /hpf  --  1-2   RBC UA /hpf  --  0-1   BACTERIA UA /hpf  --  Occasional   EPITHELIAL CELLS WET PREP /hpf  --  Occasional         ED Treatment:   Medication Administration from 03/12/2024 1130 to 03/12/2024 1904         Date/Time Order Dose Route Action Comments     03/12/2024 1740 EDT polyethylene glycol (GOLYTELY) bowel prep 4,000 mL 4,000 mL Oral Given --            Present on Admission:   Acquired hypothyroidism   Gastroesophageal reflux disease without esophagitis      Admitting Diagnosis: Rectal bleeding [K62.5]  GI bleed [K92.2]  Age/Sex: 63 y.o. female  Admission Orders:  Scheduled Medications:  levothyroxine, 100 mcg, Oral, Early Morning  pantoprazole, 40 mg, Intravenous, Q12H JESENIA  potassium chloride, 20 mEq, Intravenous, Once      Continuous IV Infusions:     PRN Meds:  acetaminophen, 650 mg, Oral, Q6H PRN  ondansetron, 4 mg, Intravenous, Q6H PRN        IP CONSULT TO GASTROENTEROLOGY  IP CONSULT TO CASE MANAGEMENT    Network Utilization  Review Department  ATTENTION: Please call with any questions or concerns to [phone] and carefully listen to the prompts so that you are directed to the right person. All voicemails are confidential.   For Discharge needs, contact Care Management DC Support Team at [phone] opt. [phone]  Send all requests for admission clinical reviews, approved or denied determinations and any other requests to dedicated fax number below belonging to the campus where the patient is receiving treatment. List of dedicated fax numbers for the Facilities:  FACILITY NAME UR FAX NUMBER   ADMISSION DENIALS (Administrative/Medical Necessity) [phone]   DISCHARGE SUPPORT TEAM (NETWORK) [phone]   PARENT CHILD HEALTH (Maternity/NICU/Pediatrics) [phone]   [facility] [phone]   [facility] [phone]   [facility] [phone]   [facility] [phone]   [facility] [phone]   [facility] [phone]   [facility] [phone]   [facility] [phone]   [facility] [phone]   [facility] [phone]   [facility] [phone]   [facility] [phone]

## 2024-03-13 NOTE — ASSESSMENT & PLAN NOTE
"Patient admitted with multiple episodes of rectal bleeding and lower abdominal pain  CT abdomen pelvis-\"Findings of epiploic appendagitis. Given presence near the bladder dome, this may cause urinary symptoms.\"  Hemoglobin on admission is 14.1  Hemoglobin this morning is 12.6 and stable  GI consult cruciated  Patient underwent colonoscopy which showed medium hemorrhoids  Subcentimeter polyp in the sigmoid colon was removed with cold forceps biopsy  No active bleeding  Was started on diet and tolerating well.  Patient is cleared by GI for discharge with outpatient follow-up  "

## 2024-03-13 NOTE — CASE MANAGEMENT
Case Management Assessment & Discharge Planning Note    Patient name Pascale Morgan  Location /-01 MRN [id number]  : 1960 Date 3/13/2024       Current Admission Date: 3/12/2024  Current Admission Diagnosis:Rectal bleeding   Patient Active Problem List    Diagnosis Date Noted    Rectal bleeding 2024    Gas bloat syndrome 2024    Gastroesophageal reflux disease without esophagitis 2024    Sleep related hypoxia     Obstructive sleep apnea syndrome     New onset type 2 diabetes mellitus (HCC) 10/19/2022    Colon cancer screening 2021    COVID-19 virus infection 2021    Abnormal EKG 2017    Palpitations 2017    Chest pain, non-cardiac 2017    Abnormal vaginal bleeding 2017    Pre-diabetes 2017    Anxiety 2016    Hypercholesteremia 2016    Impaired fasting glucose 2016    Acquired hypothyroidism 2016      LOS (days): 1  Geometric Mean LOS (GMLOS) (days):   Days to GMLOS:     OBJECTIVE:    Risk of Unplanned Readmission Score: 8.95         Current admission status: Inpatient  Referral Reason: Other (Post acute needs)    Preferred Pharmacy:   [facility] - [address]  [address] [facility] [address]  Phone: [phone] Fax: [phone]    [id number] - [place], [place] - [address] [address]  Phone: [phone] Fax: [phone]    Primary Care Provider: BRIDGER Jacob    Primary Insurance: [employer] ADMINISTRATORS  Secondary Insurance:     ASSESSMENT:  Active Health Care Proxies    There are no active Health Care Proxies on file.       Advance Directives  Does patient have a Health Care POA?: No  Was patient offered paperwork?: Yes (declined)  Does patient currently have a Health Care decision maker?: Yes, please see Health Care Proxy section  Does patient have Advance Directives?: No  Was patient offered  paperwork?: Yes (declined)  Primary Contact: Gulshan Morgan         Readmission Root Cause  30 Day Readmission: No    Patient Information  Admitted from:: Home  Mental Status: Alert  During Assessment patient was accompanied by: Not accompanied during assessment  Assessment information provided by:: Patient  Primary Caregiver: Self  Support Systems: Self, Family members  County of Residence: [place]  What city do you live in?: [place]  Home entry access options. Select all that apply.: Stairs  Number of steps to enter home.: 1  Do the steps have railings?: No  Type of Current Residence: [facility]  Living Arrangements: Lives w/ Spouse/significant other  Is patient a ?: No    Activities of Daily Living Prior to Admission  Functional Status: Independent  Completes ADLs independently?: Yes  Ambulates independently?: Yes  Does patient use assisted devices?: Yes  Assisted Devices (DME) used: CPAP  DME Company Name (respiratory supplies): [employer]  Does patient currently own DME?: Yes  What DME does the patient currently own?: CPAP  Does patient have a history of Outpatient Therapy (PT/OT)?: Yes  Does the patient have a history of Short-Term Rehab?: No  Does patient have a history of HHC?: No  Does patient currently have HHC?: No         Patient Information Continued  Income Source: Self-employed  Does patient have prescription coverage?: Yes  Does patient receive dialysis treatments?: No  Does patient have a history of substance abuse?: No  Does patient have a history of Mental Health Diagnosis?: No         Means of Transportation  Means of Transport to Appts:: Drives Self      Social Determinants of Health (SDOH)      Flowsheet Row Most Recent Value   Housing Stability    In the last 12 months, was there a time when you were not able to pay the mortgage or rent on time? N   In the last 12 months, how many places have you lived? 1   In the last 12 months, was there a time when you did not have a steady place to sleep or  slept in a shelter (including now)? N   Transportation Needs    In the past 12 months, has lack of transportation kept you from medical appointments or from getting medications? no   In the past 12 months, has lack of transportation kept you from meetings, work, or from getting things needed for daily living? No   Food Insecurity    Within the past 12 months, you worried that your food would run out before you got the money to buy more. Never true   Within the past 12 months, the food you bought just didn't last and you didn't have money to get more. Never true   Utilities    In the past 12 months has the electric, gas, oil, or water company threatened to shut off services in your home? No            DISCHARGE DETAILS:    Discharge planning discussed with:: patient at bedside  Freedom of Choice: Yes  Comments - Freedom of Choice: Discussed dc planning and role of CM. Discussed likely dc today after colonoscopy later today  CM contacted family/caregiver?: No- see comments (pt alert and indpt at baseline)  Were Treatment Team discharge recommendations reviewed with patient/caregiver?: Yes  Did patient/caregiver verbalize understanding of patient care needs?: Yes       Contacts  Reason/Outcome: Discharge Planning    Requested Home Health Care         Is the patient interested in HHC at discharge?: No    DME Referral Provided  Referral made for DME?: No    Other Referral/Resources/Interventions Provided:  Interventions: None Indicated  Referral Comments: No needs anticipated.            Discharge Destination Plan:: Home  Transport at Discharge : Self                                      Additional Comments: Pt for colonoscopy today. Indpt at baseline. Lives with spouse in ranch style home with 1 lily. Drove self here.Plans to drive home. No needs anticiapted.

## 2024-03-13 NOTE — CONSULTS
Consultation - GI   Pascale Morgan 63 y.o. female MRN: [id number]  Unit/Bed#: -01 Encounter: [id number]      Assessment/Plan       63 y.o. year old female with past medical history of hypothyroidism, gastroparesis, anxiety and GERD presents with complaint of rectal bleeding.  Patient states that her rectal bleeding was occasional for about 2 weeks however for the past 2 days she has had increased bleeding on the tissue and present in the toilet.  Patient states she has been having increased gas and bloating.  Had been seen in the office 3/6 by Dr. Ibarra and was scheduled to have colonoscopy.  Consider evaluation for SIBO.    1.  Rectal bleeding  Patient states her rectal bleeding initially started about 2 weeks ago however increased over the past 2 days.  Patient states increased hematochezia to the toilet tissue and increased blood in the toilet bowl.  Patient noted left lower quadrant abdominal discomfort however on bedside exam denied abdominal tenderness with palpation.  Hemoglobin with a.m. labs today 12.6.  No overt GI bleeding noted by staff.  Consider hemorrhoid, microscopic colitis, IBS vs IBD.    -Monitor for GI bleeding   -Colonoscopy scheduled for this morning  -Continue pantoprazole 40 mg IVP every 12 hours until colonoscopy completed    Colonoscopy 9/2015 noted hemorrhoid otherwise normal, 10-year recall    2.  Gastroesophageal reflux disease  Patient states she does take Protonix as needed for acid reflux symptoms.  Last EGD 12/2021 noted mild gastritis, food bolus (gastroparesis)    3.  Gastroparesis  Patient was noted to have food bolus with EGD 12/2021.  Consider gastric emptying scan as an outpatient to follow.  Discussed with patient frequent small meals.    -Continue PPI    4.  Obesity  Patient is concerned about her weight and also noted hepatic steatosis on imaging.  Current BMI 35.43.  Discussed with patient looking into weight management program in the outpatient setting.    5.  Hepatic  steatosis  MRI 2/29/2024 noted hepatomegaly with severe hepatic steatosis.  Patient has had slightly elevated AST and ALT per EMR back to 4/2021.  Current liver enzymes noted AST 45, ALT 40, alkaline phos 76, T. bili 0.83.    -Discussed with patient outpatient follow-up.           Inpatient consult to gastroenterology  Consult performed by: BRIDGER Treviño  Consult ordered by: Pratik Orozco MD          Physician Requesting Consult: Pratik Orozco MD  Reason for Consult / Principal Problem: GI bleed    HPI: Pascale Morgan is a 63 y.o. year old female with past medical history of high both thyroidism, anxiety and GERD presents with complaint of rectal bleeding.  Patient states that her rectal bleeding was occasional for about 2 weeks however for the past 2 days she has had increased bleeding on the tissue and present in the toilet.  On exam, patient denies nausea or vomiting.  She states she has been having some left lower quadrant discomfort however on bedside exam denies any abdominal tenderness with palpation.  She denies any acid reflux symptoms however patient does state that she takes Protonix as needed at home whenever she has acid indigestion.  She states her bowel movements more recently have been loose and thinner.  She does note increased hematochezia to the tissue and toilet bowl, denies melena.  Non-smoker, social EtOH use.  Patient states her weight is stable.    CT A/P on admission noted; Mild wall thickening at the dome is likely reactive from adjacent epiploic appendagitis.6 mm hyperenhancing right inferior lobe lesion.  Stomach and bowel unremarkable.  Significant lab work on admission; hemoglobin 13.8, WBC 7.87, platelets 316, AST 46.      Review of Systems: Negative for 14 point exam except for HPI.    Historical Information   Past Medical History:   Diagnosis Date    Anxiety     COVID-19     in march    Disease of thyroid gland      Past Surgical History:   Procedure Laterality Date     BREAST BIOPSY Right     benign    FRACTURE SURGERY Right     arm    WISDOM TOOTH EXTRACTION       Social History   Social History     Substance and Sexual Activity   Alcohol Use Yes    Alcohol/week: 2.0 standard drinks of alcohol    Types: 2 Glasses of wine per week    Comment: socially     Social History     Substance and Sexual Activity   Drug Use No     Social History     Tobacco Use   Smoking Status Never   Smokeless Tobacco Never     Family History   Problem Relation Age of Onset    Dementia Mother     Heart failure Mother     Diabetes Mother     Cancer Father     Pancreatic cancer Father     Uterine cancer Sister 62    Dementia Sister     No Known Problems Sister     No Known Problems Sister     No Known Problems Maternal Grandmother     No Known Problems Maternal Grandfather     No Known Problems Paternal Grandmother     No Known Problems Paternal Grandfather     Colon cancer Neg Hx     Colon polyps Neg Hx        Meds/Allergies   Current Facility-Administered Medications   Medication Dose Route Frequency    acetaminophen (TYLENOL) tablet 650 mg  650 mg Oral Q6H PRN    levothyroxine tablet 100 mcg  100 mcg Oral Early Morning    ondansetron (ZOFRAN) injection 4 mg  4 mg Intravenous Q6H PRN    pantoprazole (PROTONIX) injection 40 mg  40 mg Intravenous Q12H JESENIA     Medications Prior to Admission   Medication    levothyroxine 100 mcg tablet    pantoprazole (PROTONIX) 40 mg tablet       Allergies   Allergen Reactions    Sulfa Antibiotics Anaphylaxis    Statins Hives           Physical Exam   Constitutional: Is oriented to person, place, and time. Appears well-developed and well-nourished.     Normal exam    HENT: WNL  Head: Normocephalic and atraumatic.   Eyes: Pupils are equal, round, and reactive to light.   Neck: Normal range of motion. Neck supple.   Cardiovascular: Normal rate and regular rhythm.    Pulmonary/Chest: Effort normal and breath sounds normal.   Abdominal: Soft. Bowel sounds are normal.    Musculoskeletal: Normal range of motion.   Extremities:  No edema  Neurological: Is alert and oriented to person, place, and time.   Skin: Skin is warm and dry.   Psychiatric: Has a normal mood and affect.       Lab Results:   Admission on 03/12/2024   Component Date Value    WBC 03/12/2024 5.61     RBC 03/12/2024 4.71     Hemoglobin 03/12/2024 14.1     Hematocrit 03/12/2024 43.1     MCV 03/12/2024 92     MCH 03/12/2024 29.9     MCHC 03/12/2024 32.7     RDW 03/12/2024 13.2     MPV 03/12/2024 10.1     Platelets 03/12/2024 310     nRBC 03/12/2024 0     Neutrophils Relative 03/12/2024 55     Immature Grans % 03/12/2024 1     Lymphocytes Relative 03/12/2024 33     Monocytes Relative 03/12/2024 9     Eosinophils Relative 03/12/2024 1     Basophils Relative 03/12/2024 1     Neutrophils Absolute 03/12/2024 3.16     Absolute Immature Grans 03/12/2024 0.03     Absolute Lymphocytes 03/12/2024 1.83     Absolute Monocytes 03/12/2024 0.49     Eosinophils Absolute 03/12/2024 0.05     Basophils Absolute 03/12/2024 0.05     Sodium 03/12/2024 139     Potassium 03/12/2024 3.7     Chloride 03/12/2024 104     CO2 03/12/2024 27     ANION GAP 03/12/2024 8     BUN 03/12/2024 8     Creatinine 03/12/2024 0.82     Glucose 03/12/2024 127     Calcium 03/12/2024 9.9     AST 03/12/2024 52 (H)     ALT 03/12/2024 45     Alkaline Phosphatase 03/12/2024 90     Total Protein 03/12/2024 7.9     Albumin 03/12/2024 4.6     Total Bilirubin 03/12/2024 0.97     eGFR 03/12/2024 76     Protime 03/12/2024 13.3     INR 03/12/2024 0.97     PTT 03/12/2024 26     Color, UA 03/12/2024 Light Yellow     Clarity, UA 03/12/2024 Clear     Specific Gravity, UA 03/12/2024 1.010     pH, UA 03/12/2024 6.5     Leukocytes, UA 03/12/2024 Negative     Nitrite, UA 03/12/2024 Negative     Protein, UA 03/12/2024 Negative     Glucose, UA 03/12/2024 Negative     Ketones, UA 03/12/2024 Negative     Urobilinogen, UA 03/12/2024 <2.0     Bilirubin, UA 03/12/2024 Negative     Occult  Blood, UA 03/12/2024 Negative     Hemoglobin 03/13/2024 13.5     Hematocrit 03/13/2024 42.5     Sodium 03/13/2024 142     Potassium 03/13/2024 3.5     Chloride 03/13/2024 107     CO2 03/13/2024 29     ANION GAP 03/13/2024 6     BUN 03/13/2024 7     Creatinine 03/13/2024 0.77     Glucose 03/13/2024 109     Calcium 03/13/2024 9.3     AST 03/13/2024 45 (H)     ALT 03/13/2024 40     Alkaline Phosphatase 03/13/2024 76     Total Protein 03/13/2024 6.9     Albumin 03/13/2024 4.1     Total Bilirubin 03/13/2024 0.83     eGFR 03/13/2024 83     Magnesium 03/13/2024 2.0     Phosphorus 03/13/2024 3.9     WBC 03/13/2024 6.24     RBC 03/13/2024 4.33     Hemoglobin 03/13/2024 12.6     Hematocrit 03/13/2024 39.5     MCV 03/13/2024 91     MCH 03/13/2024 29.1     MCHC 03/13/2024 31.9     RDW 03/13/2024 13.2     MPV 03/13/2024 10.2     Platelets 03/13/2024 284     nRBC 03/13/2024 0     Neutrophils Relative 03/13/2024 42 (L)     Immature Grans % 03/13/2024 0     Lymphocytes Relative 03/13/2024 46 (H)     Monocytes Relative 03/13/2024 9     Eosinophils Relative 03/13/2024 2     Basophils Relative 03/13/2024 1     Neutrophils Absolute 03/13/2024 2.63     Absolute Immature Grans 03/13/2024 0.02     Absolute Lymphocytes 03/13/2024 2.90     Absolute Monocytes 03/13/2024 0.53     Eosinophils Absolute 03/13/2024 0.11     Basophils Absolute 03/13/2024 0.05      Imaging Studies: I have personally reviewed pertinent reports.      EKG, Pathology, and Other Studies: I have personally reviewed pertinent reports.    Counseling / Coordination of Care  Total floor / unit time spent today 45 minutes.

## 2024-03-13 NOTE — UTILIZATION REVIEW
NOTIFICATION OF INPATIENT ADMISSION   AUTHORIZATION REQUEST   SERVICING FACILITY:   [place] [address]  Tax ID: [id number]  NPI: [id number] ATTENDING PROVIDER:  Attending Name and NPI#: Pratik Orozco Md [[id number]]  Address: [address] [place] [address]  Phone: [phone]   ADMISSION INFORMATION:  Place of Service: Inpatient [facility]  Place of Service Code: 21  Inpatient Admission Date/Time: 3/12/24  4:15 PM  Discharge Date/Time: No discharge date for patient encounter.  Admitting Diagnosis Code/Description:  Rectal bleeding [K62.5]  GI bleed [K92.2]     UTILIZATION REVIEW CONTACT:  Nadege Jones Utilization   Network Utilization Review Department  Phone: [phone]  Fax [phone]  Email: [email or web address]  Contact for approvals/pending authorizations, clinical reviews, and discharge.     PHYSICIAN ADVISORY SERVICES:  Medical Necessity Denial & Jiqa-kh-Otmj Review  Phone: [phone]  Fax: [phone]  Email: [email or web address]     DISCHARGE SUPPORT TEAM:  For Patients Discharge Needs & Updates  Phone: [phone] opt. 2 Fax: [phone]  Email: [email or web address]

## 2024-03-14 ENCOUNTER — TRANSITIONAL CARE MANAGEMENT (OUTPATIENT)
Dept: FAMILY MEDICINE CLINIC | Facility: CLINIC | Age: 64
End: 2024-03-14

## 2024-03-14 NOTE — UTILIZATION REVIEW
NOTIFICATION OF ADMISSION DISCHARGE   This is a Notification of Discharge from [facility]. Please be advised that this patient has been discharge from our facility. Below you will find the admission and discharge date and time including the patient’s disposition.   UTILIZATION REVIEW CONTACT:  Nadege Jones  Utilization   Network Utilization Review Department  Phone: [phone] [phone] carefully listen to the prompts. All voicemails are confidential.  Email: [email or web address]     ADMISSION INFORMATION  PRESENTATION DATE: 3/12/2024  3:29 PM  OBERVATION ADMISSION DATE:   INPATIENT ADMISSION DATE: 3/12/24  4:15 PM   DISCHARGE DATE: 3/13/2024  7:02 PM   DISPOSITION:Home/Self Care    Network Utilization Review Department  ATTENTION: Please call with any questions or concerns to [phone] and carefully listen to the prompts so that you are directed to the right person. All voicemails are confidential.   For Discharge needs, contact Care Management DC Support Team at [phone] opt. [phone]  Send all requests for admission clinical reviews, approved or denied determinations and any other requests to dedicated fax number below belonging to the campus where the patient is receiving treatment. List of dedicated fax numbers for the Facilities:  FACILITY NAME UR FAX NUMBER   ADMISSION DENIALS (Administrative/Medical Necessity) [phone]   DISCHARGE SUPPORT TEAM ([facility]) [phone]   PARENT CHILD HEALTH (Maternity/NICU/Pediatrics) [phone]   [facility] [phone]   [facility] [phone]   [facility] [phone]   [facility] [phone]   Counts include 234 beds at the [facility] [phone]   [facility] [phone]   [facility] [phone]   [facility] [phone]    [facility] [phone]   [facility] [phone]   [facility] [phone]   [facility] [phone]

## 2024-03-15 ENCOUNTER — NURSE TRIAGE (OUTPATIENT)
Age: 64
End: 2024-03-15

## 2024-03-15 ENCOUNTER — OFFICE VISIT (OUTPATIENT)
Dept: FAMILY MEDICINE CLINIC | Facility: CLINIC | Age: 64
End: 2024-03-15
Payer: COMMERCIAL

## 2024-03-15 VITALS
HEIGHT: 64 IN | HEART RATE: 74 BPM | DIASTOLIC BLOOD PRESSURE: 80 MMHG | SYSTOLIC BLOOD PRESSURE: 128 MMHG | BODY MASS INDEX: 36.37 KG/M2 | RESPIRATION RATE: 18 BRPM | OXYGEN SATURATION: 96 % | TEMPERATURE: 97.4 F | WEIGHT: 213 LBS

## 2024-03-15 DIAGNOSIS — K62.5 RECTAL BLEEDING: ICD-10-CM

## 2024-03-15 DIAGNOSIS — Z76.89 ENCOUNTER FOR SUPPORT AND COORDINATION OF TRANSITION OF CARE: Primary | ICD-10-CM

## 2024-03-15 DIAGNOSIS — K58.9 IRRITABLE BOWEL SYNDROME, UNSPECIFIED TYPE: ICD-10-CM

## 2024-03-15 PROCEDURE — 88305 TISSUE EXAM BY PATHOLOGIST: CPT | Performed by: PATHOLOGY

## 2024-03-15 PROCEDURE — 99495 TRANSJ CARE MGMT MOD F2F 14D: CPT

## 2024-03-15 RX ORDER — DICYCLOMINE HYDROCHLORIDE 10 MG/1
10 CAPSULE ORAL
Qty: 120 CAPSULE | Refills: 1 | Status: SHIPPED | OUTPATIENT
Start: 2024-03-15

## 2024-03-15 NOTE — TELEPHONE ENCOUNTER
"LOV 03/06/24, Colon 03/13/24, CT abd/pelv w con 03/12/24, F/U 04/16/24    Pt post colonoscopy requesting f/u appointment in 1 month. Pt reports while recovering in PACU  stated she has IBS and needs to change diet. Pt requesting further advice. Trigger foods/drinks were discussed at length. Pt grateful.     A this time pt requests provider be changed to . F/U scheduled with  04/16.    Pt reports ongoing, intermittent, lower abdominal pain 4 - 9/10 described as sharp/shooting and abdominal bloating. Pt denies nausea, vomiting, fever, chills or any other alarming symptoms. Discussed alarming symptoms that would warrant ED evaluation. Pt awaiting pending colonoscopic tissue biopsy results.        Reason for Disposition   Abdominal pain is a chronic symptom (recurrent or ongoing AND lasting > 4 weeks)    Answer Assessment - Initial Assessment Questions  1. LOCATION: \"Where does it hurt?\"       LLQ    2. RADIATION: \"Does the pain shoot anywhere else?\" (e.g., chest, back)      Shooting left lower, right lower    3. ONSET: \"When did the pain begin?\" (e.g., minutes, hours or days ago)      01/24    5. PATTERN \"Does the pain come and go, or is it constant?\"     - If constant: \"Is it getting better, staying the same, or worsening?\"       (Note: Constant means the pain never goes away completely; most serious pain is constant and it progresses)      - If intermittent: \"How long does it last?\" \"Do you have pain now?\"      (Note: Intermittent means the pain goes away completely between bouts)      Intermittent     6. SEVERITY: \"How bad is the pain?\"  (e.g., Scale 1-10; mild, moderate, or severe)    - MILD (1-3): doesn't interfere with normal activities, abdomen soft and not tender to touch     - MODERATE (4-7): interferes with normal activities or awakens from sleep, tender to touch     - SEVERE (8-10): excruciating pain, doubled over, unable to do any normal activities       4 - 9/10    8. CAUSE: " "\"What do you think is causing the stomach pain?\"      Unsure    10. OTHER SYMPTOMS: \"Has there been any vomiting, diarrhea, constipation, or urine problems?\"        Bloating    Protocols used: Abdominal Pain - Female-ADULT-[place]    "

## 2024-03-16 PROBLEM — K58.9 IRRITABLE BOWEL SYNDROME: Status: ACTIVE | Noted: 2024-03-16

## 2024-03-16 NOTE — PROGRESS NOTES
Name: Pascale Morgan      : 1960      MRN: [id number]  Encounter Provider: BRIDGER Delaney  Encounter Date: 3/15/2024   Encounter department: [facility]    Assessment & Plan     1. Encounter for support and coordination of transition of care    2. Rectal bleeding  Assessment & Plan:  Patient presents for TCM sp hospitalization for rectal bleeding. Underwent colonoscopy--- benign polyp removed and internal hemorrhoids noted. CT-- findings of epiploic appendagitis. Scheduled for follow up with GI.       3. Irritable bowel syndrome, unspecified type  Assessment & Plan:  Reports symptoms-- abd cramping, mixed constipation and diarrhea-- have increased in frequency since being under more stress. Trial dicyclomine. Patient scheduled to follow up with GI. Patient to follow up here in 2-4 weeks.     Orders:  -     dicyclomine (BENTYL) 10 mg capsule; Take 1 capsule (10 mg total) by mouth 4 (four) times a day (before meals and at bedtime)        Depression Screening and Follow-up Plan: Patient was screened for depression during today's encounter. They screened negative with a PHQ-2 score of 0.        Subjective      Abdominal Cramping  This is a recurrent problem. The current episode started more than 1 month ago. The pain is located in the generalized abdominal region (worse in lower abdomen). The pain is moderate. Associated symptoms include constipation and diarrhea. Pertinent negatives include no anorexia, arthralgias, belching, dysuria, fever, flatus, frequency, headaches, hematochezia, hematuria, melena, myalgias, nausea, vomiting or weight loss. Exacerbated by: stress. The pain is relieved by Nothing. She has tried proton pump inhibitors for the symptoms.     Review of Systems   Constitutional:  Negative for activity change, fever and weight loss.   HENT:  Negative for congestion, ear pain, rhinorrhea and sore throat.    Eyes:  Negative for pain.   Respiratory:   "Negative for cough, shortness of breath and wheezing.    Cardiovascular:  Negative for chest pain and leg swelling.   Gastrointestinal:  Positive for constipation and diarrhea. Negative for abdominal distention, abdominal pain, anal bleeding, anorexia, blood in stool, flatus, hematochezia, melena, nausea, rectal pain and vomiting.   Genitourinary:  Negative for dysuria, frequency and hematuria.   Musculoskeletal:  Negative for arthralgias and myalgias.   Skin:  Negative for rash.   Neurological:  Negative for dizziness, weakness, numbness and headaches.   All other systems reviewed and are negative.      Current Outpatient Medications on File Prior to Visit   Medication Sig    levothyroxine 100 mcg tablet Take 1 tablet (100 mcg total) by mouth daily    pantoprazole (PROTONIX) 40 mg tablet Take 1 tablet (40 mg total) by mouth daily       Objective     /80 (BP Location: Left arm, Patient Position: Sitting, Cuff Size: Adult)   Pulse 74   Temp (!) 97.4 °F (36.3 °C) (Tympanic)   Resp 18   Ht 5' 4\" (1.626 m)   Wt 96.6 kg (213 lb)   SpO2 96%   BMI 36.56 kg/m²     Physical Exam  Vitals and nursing note reviewed.   Constitutional:       General: She is not in acute distress.     Appearance: Normal appearance. She is not ill-appearing.   HENT:      Head: Normocephalic.      Right Ear: External ear normal.      Left Ear: External ear normal.   Cardiovascular:      Rate and Rhythm: Normal rate and regular rhythm.      Heart sounds: Normal heart sounds. No murmur heard.  Pulmonary:      Effort: Pulmonary effort is normal. No respiratory distress.      Breath sounds: Normal breath sounds. No wheezing.   Abdominal:      General: Bowel sounds are normal. There is no distension.      Palpations: Abdomen is soft.      Tenderness: There is no abdominal tenderness. There is no guarding or rebound.   Musculoskeletal:      Cervical back: Neck supple.   Skin:     General: Skin is warm and dry.      Coloration: Skin is not " pale.   Neurological:      General: No focal deficit present.      Mental Status: She is alert and oriented to person, place, and time. Mental status is at baseline.   Psychiatric:         Mood and Affect: Mood normal.         Behavior: Behavior normal.         Thought Content: Thought content normal.         Judgment: Judgment normal.       BRIDGER Delaney

## 2024-03-16 NOTE — ASSESSMENT & PLAN NOTE
Reports symptoms-- abd cramping, mixed constipation and diarrhea-- have increased in frequency since being under more stress. Trial dicyclomine. Patient scheduled to follow up with GI. Patient to follow up here in 2-4 weeks.

## 2024-03-16 NOTE — ASSESSMENT & PLAN NOTE
Patient presents for TCM sp hospitalization for rectal bleeding. Underwent colonoscopy--- benign polyp removed and internal hemorrhoids noted. CT-- findings of epiploic appendagitis. Scheduled for follow up with GI.

## 2024-03-18 ENCOUNTER — TELEPHONE (OUTPATIENT)
Dept: GASTROENTEROLOGY | Facility: CLINIC | Age: 64
End: 2024-03-18

## 2024-03-18 NOTE — TELEPHONE ENCOUNTER
Regarding: IBS  ----- Message from Rosanna Baker PA-C sent at 3/15/2024  2:21 PM EDT -----  Please change follow up appointment to Dr. Jones for continuity of care.    Thanks,  Rosanna     ----- Message sent from Rosanna Baker PA-C to Pascale Morgan at 3/15/2024  2:20 PM -----   Zara Escobedo RN from [facility] asked me to reach out to you to discuss IBS which is what Dr. Jones said you likely had after colonoscopy.  Irritable bowel is a hypersensitivity of the gut nerves,  people can get abdominal pain, diarrhea, constipation and bloating.  Usually we tell you to keep a food journal and avoid food triggers.  You can follow a low FODMAP diet to help pinpoint triggers.  Sometimes there are medicines we can use to treat spasm of the bowel.  Foods that worsen bloating are dairy, raw fruits and vegetables, artificial sweeteners, carbonated beverages, chewing gum and drinking through a straw.  You can try Gas-X Beano or Gaviscon over the counter to help with bloating and gas.  I would make a follow up with Dr. Jones and you can discuss further management then.  I hope this is helpful.    Thanks,  Rosanna Baker PA-C

## 2024-03-19 NOTE — TELEPHONE ENCOUNTER
3/19/2024 - [facility] for patient to call and schedule follow up appt with Dr. Jones post procedure. MARTHA

## 2024-03-29 ENCOUNTER — OFFICE VISIT (OUTPATIENT)
Dept: FAMILY MEDICINE CLINIC | Facility: CLINIC | Age: 64
End: 2024-03-29
Payer: COMMERCIAL

## 2024-03-29 VITALS
SYSTOLIC BLOOD PRESSURE: 126 MMHG | DIASTOLIC BLOOD PRESSURE: 80 MMHG | OXYGEN SATURATION: 99 % | WEIGHT: 216 LBS | BODY MASS INDEX: 37.08 KG/M2

## 2024-03-29 DIAGNOSIS — Z12.4 CERVICAL CANCER SCREENING: ICD-10-CM

## 2024-03-29 DIAGNOSIS — F41.9 ANXIETY: ICD-10-CM

## 2024-03-29 DIAGNOSIS — Z01.419 SMEAR, VAGINAL, AS PART OF ROUTINE GYNECOLOGICAL EXAMINATION: Primary | ICD-10-CM

## 2024-03-29 DIAGNOSIS — Z12.72 SMEAR, VAGINAL, AS PART OF ROUTINE GYNECOLOGICAL EXAMINATION: Primary | ICD-10-CM

## 2024-03-29 PROCEDURE — S0612 ANNUAL GYNECOLOGICAL EXAMINA: HCPCS

## 2024-03-29 RX ORDER — HYDROXYZINE PAMOATE 25 MG/1
25 CAPSULE ORAL 3 TIMES DAILY PRN
Qty: 60 CAPSULE | Refills: 2 | Status: SHIPPED | OUTPATIENT
Start: 2024-03-29

## 2024-03-29 NOTE — PROGRESS NOTES
Name: Pascale Morgan      : 1960      MRN: [id number]  Encounter Provider: BRIDGER Delaney  Encounter Date: 3/29/2024   Encounter department: [facility]    Assessment & Plan     1. Smear, vaginal, as part of routine gynecological examination    2. Anxiety  -     hydrOXYzine pamoate (VISTARIL) 25 mg capsule; Take 1 capsule (25 mg total) by mouth 3 (three) times a day as needed for anxiety    3. Cervical cancer screening        Depression Screening and Follow-up Plan: Patient was screened for depression during today's encounter. They screened negative with a PHQ-2 score of 0.        Subjective      Presents for papsmear.    Anxiety  Presents for initial visit. Onset was 1 to 6 months ago. The problem has been waxing and waning. Symptoms include muscle tension, nervous/anxious behavior and restlessness. Patient reports no chest pain, compulsions, confusion, decreased concentration, depressed mood, dizziness, dry mouth, excessive worry, feeling of choking, hyperventilation, insomnia, irritability, malaise, nausea, obsessions, palpitations, panic, shortness of breath or suicidal ideas. Symptoms occur occasionally. The severity of symptoms is moderate. The symptoms are aggravated by family issues.         Review of Systems   Constitutional:  Negative for activity change, fatigue, fever and irritability.   HENT:  Negative for congestion, ear pain, rhinorrhea and sore throat.    Eyes:  Negative for pain.   Respiratory:  Negative for cough, shortness of breath and wheezing.    Cardiovascular:  Negative for chest pain, palpitations and leg swelling.   Gastrointestinal:  Positive for abdominal pain (intermittent. GI appt 2 weeks). Negative for abdominal distention, diarrhea, nausea and vomiting.   Genitourinary:  Negative for difficulty urinating, dysuria, genital sores, hematuria, menstrual problem, vaginal bleeding and vaginal pain.   Musculoskeletal:  Negative for  arthralgias and myalgias.   Skin:  Negative for rash.   Neurological:  Negative for dizziness, weakness and numbness.   Psychiatric/Behavioral:  Negative for confusion, decreased concentration and suicidal ideas. The patient is nervous/anxious. The patient does not have insomnia.    All other systems reviewed and are negative.      Current Outpatient Medications on File Prior to Visit   Medication Sig    dicyclomine (BENTYL) 10 mg capsule Take 1 capsule (10 mg total) by mouth 4 (four) times a day (before meals and at bedtime)    levothyroxine 100 mcg tablet Take 1 tablet (100 mcg total) by mouth daily    pantoprazole (PROTONIX) 40 mg tablet Take 1 tablet (40 mg total) by mouth daily       Objective     /80 (BP Location: Left arm, Patient Position: Sitting, Cuff Size: Standard)   Wt 98 kg (216 lb)   SpO2 99%   BMI 37.08 kg/m²     Physical Exam  Vitals and nursing note reviewed. Exam conducted with a chaperone present.   Constitutional:       Appearance: Normal appearance.   HENT:      Head: Normocephalic.   Pulmonary:      Effort: Pulmonary effort is normal.   Abdominal:      General: There is no distension.      Palpations: Abdomen is soft.   Genitourinary:     General: Normal vulva.      Exam position: Lithotomy position.      Pubic Area: No rash or pubic lice.       Labia:         Right: No rash, tenderness or lesion.         Left: No rash, tenderness or lesion.       Urethra: No prolapse or urethral pain.      Vagina: No erythema, tenderness, bleeding or prolapsed vaginal walls.      Cervix: Normal. No friability, erythema or cervical bleeding.   Musculoskeletal:      Cervical back: Neck supple.   Skin:     General: Skin is warm and dry.   Neurological:      Mental Status: She is alert and oriented to person, place, and time. Mental status is at baseline.   Psychiatric:         Mood and Affect: Mood normal.         Behavior: Behavior normal.         Thought Content: Thought content normal.          Judgment: Judgment normal.       BRIDGER Delaney

## 2024-04-04 LAB
CYTOLOGIST CVX/VAG CYTO: NORMAL
DX ICD CODE: NORMAL
OTHER STN SPEC: NORMAL
PATH REPORT.FINAL DX SPEC: NORMAL
SL AMB NOTE:: NORMAL
SL AMB SPECIMEN ADEQUACY: NORMAL

## 2024-04-09 ENCOUNTER — HOSPITAL ENCOUNTER (OUTPATIENT)
Dept: MAMMOGRAPHY | Facility: IMAGING CENTER | Age: 64
Discharge: HOME | End: 2024-04-09

## 2024-04-09 ENCOUNTER — TELEPHONE (OUTPATIENT)
Dept: OTHER | Facility: OTHER | Age: 64
End: 2024-04-09

## 2024-04-09 DIAGNOSIS — Z12.31 SCREENING MAMMOGRAM, ENCOUNTER FOR: ICD-10-CM

## 2024-04-09 DIAGNOSIS — R10.2 PELVIC PAIN: Primary | ICD-10-CM

## 2024-04-09 NOTE — TELEPHONE ENCOUNTER
Pt needs pcp provider to know she was refused her  mammogram today due to her Lt breast lump . She was told she needs a breast ultrasound first. Please call pt in the AM to discuss at # [phone].

## 2024-04-10 ENCOUNTER — TELEPHONE (OUTPATIENT)
Age: 64
End: 2024-04-10

## 2024-04-10 ENCOUNTER — TELEPHONE (OUTPATIENT)
Dept: MAMMOGRAPHY | Facility: CLINIC | Age: 64
End: 2024-04-10

## 2024-04-10 NOTE — TELEPHONE ENCOUNTER
Both a diagnostic mammogram and diagnostic ultrasound was ordered previously. Please make patient aware that these tests were ordered and she needs to call and have them scheduled.

## 2024-04-10 NOTE — TELEPHONE ENCOUNTER
[facility] at her visit because she has a lump on her breast they then advised her that the steps anyone here at the office should have done was schedule her for a ultrasound before her mammogram      Patient is very upset because she stated she made both Mikala and rigo aware of her lump and was told to get the mammogram done it has been 4 months and her worries are that this was not addressed soon enough

## 2024-04-10 NOTE — TELEPHONE ENCOUNTER
Patient is aware    We received a voice message last night from mom.  The call was returned and another message was left requesting a call back.    -[facility] Sleep Team  [phone]

## 2024-04-10 NOTE — TELEPHONE ENCOUNTER
Contacted pt for gyn referral, pt concerned about pelvic pain and pap results. Reviewed pap results, pt has had US/CT/MRI for pain and is referred to GI for f/u. She prefers no gyn services at this time. Discussed with pt to contact her provider to discuss concerns about care, given gyn referral info for [facility] in [facility] and to contact us if we can help in anyway.

## 2024-04-16 ENCOUNTER — OFFICE VISIT (OUTPATIENT)
Dept: GASTROENTEROLOGY | Facility: CLINIC | Age: 64
End: 2024-04-16
Payer: COMMERCIAL

## 2024-04-16 VITALS — SYSTOLIC BLOOD PRESSURE: 118 MMHG | DIASTOLIC BLOOD PRESSURE: 74 MMHG | HEIGHT: 64 IN | BODY MASS INDEX: 37.08 KG/M2

## 2024-04-16 DIAGNOSIS — K59.04 CHRONIC IDIOPATHIC CONSTIPATION: Primary | ICD-10-CM

## 2024-04-16 PROCEDURE — 99214 OFFICE O/P EST MOD 30 MIN: CPT | Performed by: INTERNAL MEDICINE

## 2024-04-16 RX ORDER — POLYETHYLENE GLYCOL 3350 17 G/17G
17 POWDER, FOR SOLUTION ORAL DAILY
Qty: 255 G | Refills: 0 | Status: SHIPPED | OUTPATIENT
Start: 2024-04-16 | End: 2024-04-17 | Stop reason: SDUPTHER

## 2024-04-16 NOTE — PROGRESS NOTES
[facility] - Outpatient Follow-up Note  Pascale Morgan 63 y.o. female MRN: [id number]  Encounter: [id number]    ASSESSMENT AND PLAN:      1. Chronic idiopathic constipation  She does admit to recent change in bowel habits with every other day bowel movements.  I will prescribe MiraLAX daily with the instruction that she message me in the patient portal in 2 weeks to see how she is doing.  If she does start moving her bowels every day with complete evacuation and still has the bloating and pain, I will do a breath test and check for SIBO.  - polyethylene glycol (GLYCOLAX) 17 GM/SCOOP powder; Take 17 g by mouth daily  Dispense: 255 g; Refill: 0      Follow up appointment: As needed    _______________________      Chief Complaint   Patient presents with    Follow-up     Patient states she is still having LLQ abdominal pain and sometimes just feels unwell at times       HPI:   Patient is a 63 y.o. female with a significant PMH of internal hemorrhoids and GERD presenting for follow up regarding recent issues including a change in bowel habits and rectal bleeding.  A colonoscopy was performed while she was hospitalized due to rectal bleeding.  Internal hemorrhoids were found and a sigmoid hyperplastic polyp was removed.  Since the colonoscopy, she has not had any further bleeding.  However, her bloating and left lower quadrant abdominal pain persist.  She has noticed that she has had a recent change in bowel habits, moving her bowels about every other day instead of daily.  Her primary physician recently gave her dicyclomine to use, however it has not been helpful at all.    Historical Information   Past Medical History:   Diagnosis Date    Anxiety     COVID-19     in march    Disease of thyroid gland     Fatty liver ?    GERD (gastroesophageal reflux disease) ?     Past Surgical History:   Procedure Laterality Date    BREAST BIOPSY Right     benign    COLONOSCOPY  2016?    FRACTURE SURGERY  "Right     arm    UPPER GASTROINTESTINAL ENDOSCOPY  2021    WISDOM TOOTH EXTRACTION       Social History     Substance and Sexual Activity   Alcohol Use Yes    Alcohol/week: 6.0 - 8.0 standard drinks of alcohol    Types: 2 - 4 Glasses of wine, 4 Standard drinks or equivalent per week    Comment: socially     Social History     Substance and Sexual Activity   Drug Use No     Social History     Tobacco Use   Smoking Status Never   Smokeless Tobacco Never     Family History   Problem Relation Age of Onset    Dementia Mother     Heart failure Mother     Diabetes Mother         My Mother passed from congestive heart failure    Hypothyroidism Mother     Cancer Father         Father passed from pancreatic cancer and my sister passed from cancer of the uterus    Pancreatic cancer Father     Uterine cancer Sister 62    Cancer Sister         Sister passed from cancer of the uterus    Dementia Sister     Hypothyroidism Sister     Hypothyroidism Sister     No Known Problems Sister     No Known Problems Maternal Grandmother     No Known Problems Maternal Grandfather     No Known Problems Paternal Grandmother     No Known Problems Paternal Grandfather     Colon cancer Neg Hx     Colon polyps Neg Hx          Current Outpatient Medications:     hydrOXYzine pamoate (VISTARIL) 25 mg capsule    levothyroxine 100 mcg tablet    pantoprazole (PROTONIX) 40 mg tablet    polyethylene glycol (GLYCOLAX) 17 GM/SCOOP powder  Allergies   Allergen Reactions    Sulfa Antibiotics Anaphylaxis    Statins Hives     Reviewed medications and allergies and updated as indicated    PHYSICAL EXAM:    Blood pressure 118/74, height 5' 4\" (1.626 m). Body mass index is 37.08 kg/m².  General Appearance: NAD, cooperative, alert  Eyes: Anicteric  GI:  Soft, non-tender, non-distended; normal bowel sounds; no masses, no organomegaly   Rectal: Deferred  Musculoskeletal: No edema.  Skin:  No jaundice    Lab Results:   Lab Results   Component Value Date    WBC 6.24 " "03/13/2024    WBC 5.61 03/12/2024    WBC 7.87 03/11/2024    HGB 12.6 03/13/2024    HGB 13.5 03/13/2024    HGB 14.1 03/12/2024    MCV 91 03/13/2024     03/13/2024     03/12/2024     03/11/2024     Lab Results   Component Value Date     12/30/2016    K 3.5 03/13/2024     03/13/2024    CO2 29 03/13/2024    ANIONGAP 11 07/22/2015    BUN 7 03/13/2024    CREATININE 0.77 03/13/2024    GLUCOSE 132 (H) 12/30/2016    CALCIUM 9.3 03/13/2024    AST 45 (H) 03/13/2024    AST 52 (H) 03/12/2024    AST 46 (H) 03/11/2024    ALT 40 03/13/2024    ALT 45 03/12/2024    ALT 42 03/11/2024    ALKPHOS 76 03/13/2024    ALKPHOS 90 03/12/2024    ALKPHOS 92 03/11/2024    PROT 7.6 12/30/2016    BILITOT 0.8 12/30/2016    BILITOT 0.9 06/17/2016    BILITOT 0.7 07/22/2015    EGFR 83 03/13/2024     No results found for: \"IRON\", \"TIBC\", \"FERRITIN\"  Lab Results   Component Value Date    LIPASE 31 03/11/2024       Radiology Results:   No results found.  "

## 2024-04-17 DIAGNOSIS — K59.04 CHRONIC IDIOPATHIC CONSTIPATION: ICD-10-CM

## 2024-04-17 NOTE — TELEPHONE ENCOUNTER
Patients GI provider:  Dr. Pryor    Number to return call: [phone]    Reason for call: Pt calling to have new script for Polyethylene glycol (GLYCOLAX) 17 gm/scoop powder sent to [facility] #[id number] [place]. Pt state she is not able to get it through previous pharmacy,unless she pays for it.    Scheduled procedure/appointment date if applicable: N/A

## 2024-04-18 RX ORDER — POLYETHYLENE GLYCOL 3350 17 G/17G
17 POWDER, FOR SOLUTION ORAL DAILY
Qty: 255 G | Refills: 0 | Status: SHIPPED | OUTPATIENT
Start: 2024-04-18

## 2024-05-01 PROBLEM — Z12.11 COLON CANCER SCREENING: Status: RESOLVED | Noted: 2021-11-11 | Resolved: 2024-05-01

## 2024-05-23 ENCOUNTER — HOSPITAL ENCOUNTER (OUTPATIENT)
Dept: MAMMOGRAPHY | Facility: CLINIC | Age: 64
Discharge: HOME | End: 2024-05-23
Payer: COMMERCIAL

## 2024-05-23 ENCOUNTER — HOSPITAL ENCOUNTER (OUTPATIENT)
Dept: ULTRASOUND IMAGING | Facility: CLINIC | Age: 64
Discharge: HOME | End: 2024-05-23
Payer: COMMERCIAL

## 2024-05-23 VITALS — SYSTOLIC BLOOD PRESSURE: 115 MMHG | HEART RATE: 78 BPM | DIASTOLIC BLOOD PRESSURE: 78 MMHG

## 2024-05-23 VITALS — WEIGHT: 216 LBS | BODY MASS INDEX: 36.88 KG/M2 | HEIGHT: 64 IN

## 2024-05-23 DIAGNOSIS — R92.8 ABNORMAL ULTRASOUND OF BREAST: ICD-10-CM

## 2024-05-23 DIAGNOSIS — R92.8 ABNORMAL MAMMOGRAM: ICD-10-CM

## 2024-05-23 PROCEDURE — 88341 IMHCHEM/IMCYTCHM EA ADD ANTB: CPT | Performed by: PATHOLOGY

## 2024-05-23 PROCEDURE — 76642 ULTRASOUND BREAST LIMITED: CPT

## 2024-05-23 PROCEDURE — G0279 TOMOSYNTHESIS, MAMMO: HCPCS

## 2024-05-23 PROCEDURE — 88305 TISSUE EXAM BY PATHOLOGIST: CPT | Performed by: PATHOLOGY

## 2024-05-23 PROCEDURE — 88374 M/PHMTRC ALYS ISHQUANT/SEMIQ: CPT | Performed by: PATHOLOGY

## 2024-05-23 PROCEDURE — 19083 BX BREAST 1ST LESION US IMAG: CPT

## 2024-05-23 PROCEDURE — 77066 DX MAMMO INCL CAD BI: CPT

## 2024-05-23 PROCEDURE — 88360 TUMOR IMMUNOHISTOCHEM/MANUAL: CPT | Performed by: PATHOLOGY

## 2024-05-23 PROCEDURE — A4648 IMPLANTABLE TISSUE MARKER: HCPCS

## 2024-05-23 PROCEDURE — 88342 IMHCHEM/IMCYTCHM 1ST ANTB: CPT | Performed by: PATHOLOGY

## 2024-05-23 RX ORDER — LIDOCAINE HYDROCHLORIDE 10 MG/ML
5 INJECTION, SOLUTION EPIDURAL; INFILTRATION; INTRACAUDAL; PERINEURAL ONCE
Status: COMPLETED | OUTPATIENT
Start: 2024-05-23 | End: 2024-05-23

## 2024-05-23 RX ADMIN — LIDOCAINE HYDROCHLORIDE 5 ML: 10 INJECTION, SOLUTION EPIDURAL; INFILTRATION; INTRACAUDAL; PERINEURAL at 13:41

## 2024-05-23 NOTE — PROGRESS NOTES
Patient arrived via:    __x___ambulatory    _____wheelchair    _____stretcher      Domestic violence screen    __x____negative______positive    Breast Implants:    _______yes ____x____no

## 2024-05-23 NOTE — PROGRESS NOTES
Procedure type:    __x___ultrasound guided _____stereotactic    Breast:    ___x__Left _____Right    Location: 4 o'clock 4 cmfn    Needle: 12g    # of passes: 3    Clip: Jyoti    Performed by: Dr. Graham     Pressure held for 5 minutes by: Kalina Bunch Strips:    ___X__yes _____no    Band aid:    __X___yes_____no    Tolerated procedure:    __X___yes _____no

## 2024-05-23 NOTE — PROGRESS NOTES
Met with patient and Dr. Graham                 regarding recommendation for;      _____ RIGHT ___x___LEFT      __x___Ultrasound guided  ______Stereotactic  Breast biopsy.      __x___Verbalized understanding.      Blood thinners:  _____yes __x___no    Date stopped: _____n/a______    Biopsy teaching sheet given:  _______yes ___x___no    Pre procedure health information obtained from pt who presents with a left palpable breast lump. Pt has a hx of hypothyroidism & esophageal reflux.     Reviewed procedure with pt & pt verbalized understanding. Questions answered.

## 2024-05-24 NOTE — PROGRESS NOTES
Post procedure call completed 05/24/24 @ 0813    Bleeding: _____yes __X___no    Pain: _____yes ___X___no    Redness/Swelling: ______yes ___X___no    Band aid removed: ___X__yes _____no (discussed removing when she showers)    Steri-Strips intact: ___X___yes _____no (discussed with patient to remove steri strips on ... if they have not come off on their own)    Pt with no questions at this time, adv will call when results available, adv to call with any questions or concerns, has name/# for contact

## 2024-05-28 ENCOUNTER — TELEPHONE (OUTPATIENT)
Dept: MAMMOGRAPHY | Facility: CLINIC | Age: 64
End: 2024-05-28

## 2024-05-28 ENCOUNTER — TELEPHONE (OUTPATIENT)
Dept: FAMILY MEDICINE CLINIC | Facility: CLINIC | Age: 64
End: 2024-05-28

## 2024-05-28 ENCOUNTER — TELEPHONE (OUTPATIENT)
Age: 64
End: 2024-05-28

## 2024-05-28 ENCOUNTER — DOCUMENTATION (OUTPATIENT)
Dept: HEMATOLOGY ONCOLOGY | Facility: CLINIC | Age: 64
End: 2024-05-28

## 2024-05-28 PROCEDURE — 88341 IMHCHEM/IMCYTCHM EA ADD ANTB: CPT | Performed by: PATHOLOGY

## 2024-05-28 PROCEDURE — 88342 IMHCHEM/IMCYTCHM 1ST ANTB: CPT | Performed by: PATHOLOGY

## 2024-05-28 PROCEDURE — 88305 TISSUE EXAM BY PATHOLOGIST: CPT | Performed by: PATHOLOGY

## 2024-05-28 PROCEDURE — 88360 TUMOR IMMUNOHISTOCHEM/MANUAL: CPT | Performed by: PATHOLOGY

## 2024-05-28 NOTE — TELEPHONE ENCOUNTER
Patient calling requesting to speak with PCP regarding abnormal mammogram results.    Warm transfer to office.

## 2024-05-28 NOTE — PROGRESS NOTES
Intake received/ Chart reviewed for services completed outside of [facility]    Pathology completed: Left breast biopsy completed on 5-.    Imaging completed: Bilateral diagnostic mammogram completed on 5- & left diagnostic breast us completed on 5-    All records needed are in patients chart. No records retrieval needed at this time.

## 2024-05-28 NOTE — TELEPHONE ENCOUNTER
Pt got transferred to office. Was speaking with her. She asked if we in the office have access to take off results if we are about to call her,. Mentioned that I am not sure and I will check. Was about to get on the phone with her and she hung up. Was going to say we do not.

## 2024-05-28 NOTE — TELEPHONE ENCOUNTER
Oncology Care Referral    Diagnosis: Invasive Breast carcinoma    Biopsy Date: 5/23/2024    Does the patient have another biopsy pending:  If so, when: no    Preferred provider: Jaqui Gomez    Preferred location: [place]    Any requests for dates/times:     Does the patient have a IDRIS in place: Yes    Any additional information: Patient would like to be seen in [place] if possible. I let her know Dr. Gomez does go there but I do not know his schedule. She is also interested in Dr. Nails.     Please send this template to Huyen Haider and [facility]  Please let the nurse know when the patient has been scheduled

## 2024-05-28 NOTE — TELEPHONE ENCOUNTER
Spoke with patient regarding biopsy results. Has an appt with surg onc on 6/10/24. Denies any other questions or concerns at this time.     Patient expressed frustration due to attempting to call the office several times today after seeing biopsy results in [facility]. States that the first couple times she was on hold for several minutes without an answer and the last time she was able to speak with a representative but was placed on hold again.

## 2024-05-29 ENCOUNTER — PATIENT OUTREACH (OUTPATIENT)
Dept: HEMATOLOGY ONCOLOGY | Facility: CLINIC | Age: 64
End: 2024-05-29

## 2024-05-29 DIAGNOSIS — Z17.0 MALIGNANT NEOPLASM OF LEFT BREAST IN FEMALE, ESTROGEN RECEPTOR POSITIVE, UNSPECIFIED SITE OF BREAST (HCC): ICD-10-CM

## 2024-05-29 DIAGNOSIS — C50.912 MALIGNANT NEOPLASM OF LEFT BREAST IN FEMALE, ESTROGEN RECEPTOR POSITIVE, UNSPECIFIED SITE OF BREAST (HCC): ICD-10-CM

## 2024-05-29 NOTE — PROGRESS NOTES
Breast Oncology Nurse Navigator    Called patient for initial outreach from nurse navigator.  Introduced myself and my role.      Confirmed upcoming appointment and location for surgical consult with Dr. Nails on 06/10. She had questions pertaining to her pathology report, specifically what Grade 2 meant and if there is lymph node involvement. Briefly reviewed the report with her and informed her, Dr. Nails will provide detailed information about the report and recommendations for a treatment plan. I informed her as part of her treatment plan she may need to have consultation with a medical and radiation oncologist and explained their roles. She had not had mammograms in a few years but felt the lump in January and went for a mammogram. Patient stated she has been struggling with abdominal issues and kidney stones.  Patient lives with her  and her pets, including dogs,cats and birds. She also is a .   She helps care for one of her sisters who has early stage dementia. She also had a sister pass away due to uterine cancer at age 62 and her father with pancreatic cancer. We discussed genetic testing and a referral was placed.     Referral placed to oncology social worker.    Discussed the [facility] Support [facility] and some of the programs and services offered.     Patient has my contact information and knows she can reach out with questions.     General assessment completed.

## 2024-05-30 ENCOUNTER — PATIENT OUTREACH (OUTPATIENT)
Dept: CASE MANAGEMENT | Facility: HOSPITAL | Age: 64
End: 2024-05-30

## 2024-05-30 ENCOUNTER — TELEPHONE (OUTPATIENT)
Dept: GENETICS | Facility: CLINIC | Age: 64
End: 2024-05-30

## 2024-05-30 NOTE — TELEPHONE ENCOUNTER
States during her call she requested a referral for a second opinion, as well, other than Dr. Nails. Joceline stated she wopuld get back to her . Has not heard back from Joceline .

## 2024-05-30 NOTE — TELEPHONE ENCOUNTER
I introduced myself to Pascale and let her know that her nurse navigator reached out to the cancer risk and genetics program on her behalf.    I reviewed the following with Pascale:    While the majority of cancer occurs by chance, approximately 5-10% of breast cancer has an underlying genetic cause.  Genetic testing is available which can determine if there is an underlying genetic cause to your cancer.  Understanding if there is an underlying genetic cause can:  Provide your surgeon with additional information to help with surgical decisions, treatment decisions and eligibility for clinical trials.    It can determine if you have an increased risk for any additional cancers.  Help family members understand their cancer risk.       We work closely with the [facility] breast surgeons and are reaching out to see if you have interest in genetic testing. The reason we are reaching out at this time is that this result may help your surgeon determine the appropriate type of surgery (i.e. lumpectomy vs mastectomy). This test is not a requirement but can take 5-10 days to complete so we would like to start the process as soon as possible so the results are ready for your appointment with your surgeon.       If you are interested in genetic testing, I can collect your family history and initiate genetic testing for you.        Patient elected to pursue testing     Diagnosis Details:  Invasive Ductal Carcinoma  Left  Hormone receptors pending    Personal History:  Do you have a personal history of any other cancer? Yes  If yes type/age of diagnosis: BCC age 58    Family history:   Do you have Ashkenazi Nondenominational ancestry? No  If yes, maternal, paternal, or both?    Do you have any children? No    Do you have any brothers or sisters? Yes  How many brothers? 2  How many sisters? 4  Are they from the same parents? No  If no how maternal/paternal half-siblings: Paternal Half Brother x 1  Do any of your brothers or sisters have a history  of cancer? Yes  If yes who and the type/age of diagnosis:   Sister - Uterine Cancer age 63          Do you have nieces or nephews? Yes  Do any of them have a history of cancer? Yes  If yes type/age of diagnosis:  Grand Nephew - Thyroid Cancer age unsure  Grand Niece - Stage IV Spine cancer age 15 ()    Does your mother have a history of cancer? No  If yes, cancer type and age of diagnosis:   Is your mother still living? Yes  Age/Age of death: 84    Thinking about your mother's family (aunts, uncles, cousins, grandparents) is there anyone with a history of cancer? Yes  If yes, list relationship, cancer type and age of diagnosis:  Maternal Uncle - Mouth Cancer age 70 (d. 70)  Maternal Cousin - Colon Cancer age 70 (d. 71)    Does your father have a history of cancer? Yes  If yes, cancer type and age of diagnosis: Pancreatic Cancer age 69  Is your father still living? No  Age/age of death: 70    Thinking about your father's family (aunts, uncles, cousins, grandparents) is there anyone with a history of cancer? No. All maternal Aunts and Uncles passed away in their 70s-80s  If yes, list relationship, cancer type and age of diagnosis:        Types of Results - Positive, Negative, VUS  Positive Result - May explain personal diagnosis/family history. Can give surgeon information on treatment plan, inform future screening/management or tell a person about other possible risks. Positive results can initiate testing for other family members who may be at risk (children, siblings, etc)  Negative Result - Does not give an explanation. Surgical/treatment plan will be based on clinical presentation and will be part of discussion with surgeon. Negative result cannot be passed down to children, but they are still at elevated risk.  Uncertain Result - Common, but treated like a negative result clinically. 90% are downgraded over time.     [facility]'s billing policy   Most insurance plans cover the cost of genetic  testing. The out-of-pocket cost varies due to the differences in deductibles, co-payments and co-insurance defined by individual plans but 90% of people pay $100 or less for a genetic test     A blood kit will be mailed to you overnight. Please take the blood kit along with packet of paperwork to any [facility]'s lab to have your blood drawn.    We have genetic counselors available, if you have any additional questions or would like to speak with them we can schedule you a 15 minute appointment.      Plan:  A blood kit was mailed out on 5/30/2024 along with information on genetic testing and the lab's billing policy.     Genetic Testing Preformed: [employer] BRCAplus STAT Panel (13 genes): ED, BARD1, BRCA1, BRCA2, CDH1, CHEK2, NF1, PALB2, PTEN, RAD51C, RAD51D, STK11, TP53 with reflex to [employer] CustomNext: Cancer+RNAinsight (59 genes): APC, ED, AXIN2, BAP1, BARD1, BMPR1A, BRCA1, BRCA2, BRIP1, CDH1, CDK4, CDKN1B, CDKN2A, CHEK2, CTNNA1, DICER1, EGLN1, EPCAM, FH, FLCN, GREM1, HOXB13, KIF1B, KIT, MAX, MEN1, MET, MITF, MLH1, MSH2, MSH3, MSH6, MUTYH, NF1, NTHL1, PALB2, PDGFRA PMS2, POLD1, POLE, POT1, PTEN, RAD51C, RAD51D, RB1, RET, SDHA, SDHAF2, SDHB, SDHC, SDHD, SMAD4, SMARCA4, STK11, BVFW078, TP53, TSC1, TSC2, VHL     Result Call Information:  I confirmed the patient's mobile number on file as the best number to call with results  I confirmed with the patient that we can leave a voicemail on the provided numbers    Initial results will take approximately 5-12 days to return     Additional results may take up to 2-3 weeks to complete once test is started.    Patient does not have any further questions, declined meeting with genetic counselor    When your results are ready, someone from the genetics team will call you, review the results, and contact your breast surgeon. You will be contacted with any type of result- positive, negative, or uncertain.

## 2024-05-30 NOTE — PROGRESS NOTES
[facility] received referral for patient. Patient has consult with  on 6/10/24, [facility] will follow for plan.

## 2024-06-03 ENCOUNTER — APPOINTMENT (OUTPATIENT)
Dept: LAB | Facility: HOSPITAL | Age: 64
End: 2024-06-03
Payer: COMMERCIAL

## 2024-06-03 DIAGNOSIS — C50.912 DUCTAL CARCINOMA OF LEFT BREAST (HCC): ICD-10-CM

## 2024-06-03 DIAGNOSIS — Z80.0 FAMILY HISTORY OF MALIGNANT NEOPLASM OF GASTROINTESTINAL TRACT: ICD-10-CM

## 2024-06-03 PROCEDURE — 36415 COLL VENOUS BLD VENIPUNCTURE: CPT

## 2024-06-07 ENCOUNTER — PATIENT OUTREACH (OUTPATIENT)
Dept: HEMATOLOGY ONCOLOGY | Facility: CLINIC | Age: 64
End: 2024-06-07

## 2024-06-07 PROBLEM — Z17.0 MALIGNANT NEOPLASM OF LOWER-OUTER QUADRANT OF LEFT BREAST OF FEMALE, ESTROGEN RECEPTOR POSITIVE (HCC): Status: ACTIVE | Noted: 2024-06-07

## 2024-06-07 PROBLEM — C50.512 MALIGNANT NEOPLASM OF LOWER-OUTER QUADRANT OF LEFT BREAST OF FEMALE, ESTROGEN RECEPTOR POSITIVE (HCC): Status: ACTIVE | Noted: 2024-06-07

## 2024-06-07 NOTE — PROGRESS NOTES
Received voicemail from patient:  Marie Rios, this is Juan Carlos Morgan calling. I spoke to you the other week when I got the diagnosis of breast cancer, and I just had a question regarding those little things that they put in during the biopsies. I don't know, are they like little metal things or something? And would they cause irritation because they never really had irritation or problems before in my breast. Like I didn't feel any pain or anything. But since then I've been feeling pain. So I'm just wondering is that what causes it? And do they take those out after they take the lump out? If and when they do take the lump out, I do have an appointment with Marie Nails as you know the other Marie on Monday the 10th. But just was curious like so many things are going through my head right now and if you could call me back sometime that would be great. Juan Carlos Morgan, [phone] Thank you.    Attempted to call back, left voicemail with my contact information    Patient returned call at [phone]    Patient stated she is having discomfort at the site of her biopsy. She noted some bruising, pain is is a 3 out of 10. Denies warm to touch, erythema, or drainage. Afebrile. Recommended OTC Tylenol/Advil use as needed as directed on bottle and warm compresses. Advised if pain worsens, becomes febrile, warm to touch to go to ER. Patient agreed.     Confirmed appt for 06/10 with Dr Nails

## 2024-06-10 ENCOUNTER — PATIENT OUTREACH (OUTPATIENT)
Dept: HEMATOLOGY ONCOLOGY | Facility: CLINIC | Age: 64
End: 2024-06-10

## 2024-06-10 ENCOUNTER — CONSULT (OUTPATIENT)
Dept: SURGICAL ONCOLOGY | Facility: CLINIC | Age: 64
End: 2024-06-10
Payer: COMMERCIAL

## 2024-06-10 ENCOUNTER — DOCUMENTATION (OUTPATIENT)
Dept: OTHER | Facility: HOSPITAL | Age: 64
End: 2024-06-10

## 2024-06-10 VITALS
TEMPERATURE: 97.6 F | OXYGEN SATURATION: 96 % | BODY MASS INDEX: 36.43 KG/M2 | HEART RATE: 81 BPM | HEIGHT: 64 IN | RESPIRATION RATE: 18 BRPM | SYSTOLIC BLOOD PRESSURE: 118 MMHG | DIASTOLIC BLOOD PRESSURE: 80 MMHG | WEIGHT: 213.4 LBS

## 2024-06-10 DIAGNOSIS — Z17.0 MALIGNANT NEOPLASM OF LOWER-OUTER QUADRANT OF LEFT BREAST OF FEMALE, ESTROGEN RECEPTOR POSITIVE (HCC): Primary | ICD-10-CM

## 2024-06-10 DIAGNOSIS — C50.512 MALIGNANT NEOPLASM OF LOWER-OUTER QUADRANT OF LEFT BREAST OF FEMALE, ESTROGEN RECEPTOR POSITIVE (HCC): Primary | ICD-10-CM

## 2024-06-10 PROCEDURE — 99205 OFFICE O/P NEW HI 60 MIN: CPT | Performed by: SURGERY

## 2024-06-10 RX ORDER — TRAMADOL HYDROCHLORIDE 50 MG/1
50 TABLET ORAL EVERY 8 HOURS PRN
Qty: 9 TABLET | Refills: 0 | Status: SHIPPED | OUTPATIENT
Start: 2024-06-10

## 2024-06-10 RX ORDER — ACETAMINOPHEN 10 MG/ML
1000 INJECTION, SOLUTION INTRAVENOUS
OUTPATIENT
Start: 2024-06-11 | End: 2024-06-12

## 2024-06-10 RX ORDER — CEFAZOLIN SODIUM 2 G/50ML
2000 SOLUTION INTRAVENOUS ONCE
OUTPATIENT
Start: 2024-06-10 | End: 2024-06-10

## 2024-06-10 NOTE — PROGRESS NOTES
Documentation of Informed Consent Process for Clinical Research Study    Study Title: [employer] 2021-05  Patient Name: Pascale Morgan  YOB: 1960    This signed and dated document shall serve as certification that all of the below listed required elements of informed consent were provided to the subject or legally authorized representative signing the actual Informed Consent Document, both in written and verbal format.     The HIPAA consent is contained within the Informed Consent document, and has also been discussed.    A copy of the actual signed and dated Informed Consent has also been provided to the subject or legally authorized representative, and the original signed document shall be maintained in the research shadow chart.    Element of Informed Consent Discussed Date Initials/ Person obtaining consent   A statement that the study involves research, an explanation of the purposes of the research and the expected duration of the subject's participation, a description of the procedures to be followed, and identification of any procedures which are experimental 6/10/2024 HM   A description of any reasonably foreseeable risks or discomforts to the subject. 6/10/2024 HM   A description of any benefits to the subject or to others which may reasonably be expected from the research. 6/10/2024 HM   A disclosure of appropriate alternative procedures or courses of treatment, if any, that might be advantageous to the subject. 6/10/2024 HM   A statement describing the extent, if any, to which confidentiality of records identifying the subject will be maintained and that notes the possibility that the Food and Drug Administration may inspect the records 6/10/2024    For research involving more than minimal risk, an explanation as to whether any compensation and an explanation as to whether any medical treatments are available if injury occurs and, if so, what they consist of, or where further information  may be obtained. 6/10/2024    An explanation of whom to contact for answers to pertinent questions about the research and research subjects' rights, and whom to contact in the event of a research-related injury to the subject. 6/10/2024    A statement that participation is voluntary, that refusal to participate will involve no penalty or loss of benefits to which the subject is otherwise entitled, and that the subject may discontinue participation at any time without penalty or loss of benefits to which the subject is otherwise entitled. 6/10/2024    A statement that the particular treatment or procedure may involve risks to the subject (or to the embryo or fetus, if the subject is or may become pregnant) which are currently unforeseeable 6/10/2024    Anticipated circumstances under which the subject's participation may be terminated by the investigator without regard to the subject's consent. 6/10/2024    Any additional costs to the subject that may result from participation in the research 6/10/2024    The consequences of a subjects' decision to withdraw from the research and procedures for orderly termination of participation by the subject. 6/10/2024    A statement that significant new findings developed during the course of the research which may relate to the subject's willingness to continue participation will be provided to the subject. 6/10/2024    The approximate number of subjects involved in the study. 6/10/2024 HM     The patient speaks, reads and understands English?  x Y q N   If NO, Name of :___________________________________   speaks, reads, and understands English?     q Y q N   Process utilized to obtain consent:_______________________________________________   Subject meets eligibility criteria as outline in the current protocol?   x Y q N    q N/A - Reason: ___________________________________________________________  The protocol consent was reviewed with the  patient and all questions were addressed and answered?    x Y q N  The Informed Consent Teach-back section was reviewed with the subject and all questions and/or lack of understanding were addressed?    gamaliel RESTREPO q N  The subject agreed to participate and the consent was signed and dated?    gamaliel adam N  Consent was obtained prior to any research procedures being performed?    gamaliel adam N  Date & Time ICF signed ___6/10/2024 2:06 pm____________________________  Were any recruitment materials or advertisements used/discussed with the subject?   q Y x N

## 2024-06-10 NOTE — PROGRESS NOTES
Breast Consultation-Surgical Oncology     [address]  [facility]  [address]  [facility] [address]    Name:  Pascale Morgan  YOB: 1960  MRN:  [id number]    Assessment & Plan   Diagnoses and all orders for this visit:    Malignant neoplasm of lower-outer quadrant of left breast of female, estrogen receptor positive (HCC)  -     Case request operating room: LEFT BREAST IDRIS LOCALIZED LUMPECTOMY, BIOPSY LYMPH NODE SENTINEL; Standing  -     UA w Reflex to Microscopic w Reflex to Culture; Future  -     Comprehensive metabolic panel; Future  -     CBC and differential; Future  -     NM lymphatic breast; Future  -     Comprehensive metabolic panel  -     CBC and differential  -     Case request operating room: LEFT BREAST IDRIS LOCALIZED LUMPECTOMY, BIOPSY LYMPH NODE SENTINEL    Other orders  -     Incentive spirometry; Standing  -     Insert and maintain IV line; Standing  -     Void On-Call to O.R.; Standing  -     Place sequential compression device; Standing          HPI: Pascale Morgan is a 63 y.o. year old female who presents with newly diagnosed carcinoma of the left breast.  She reports feeling a mass in the beginning of the year.  She eventually had a diagnostic mammogram with biopsy.  She has family history of pancreatic cancer in her father and uterine cancer in her sister.  She submitted a sample for genetic testing which is currently pending.    Surgical treatment to date consisted of not applicable.    Oncology History:    Oncology History   Malignant neoplasm of lower-outer quadrant of left breast of female, estrogen receptor positive (HCC)   5/23/2024 Biopsy    USG biopsy  A.  Left breast, 4:00, 4 cm from nipple:     - Invasive breast carcinoma of no special type (ductal NST/invasive ductal carcinoma).       -- Tumor present in 3 of 3 tissue cores with largest measurable size of 9 mm.       -- mBR Grade:  Grade 2 of 3     - In situ component:   Cannot exclude grade 2 DCIS with necrosis.      - Lymph-vascular invasion:  Focus suspicious for lymph-vascular invasion.     2024 Initial Diagnosis    Malignant neoplasm of lower-outer quadrant of left breast of female, estrogen receptor positive (HCC)         Pertinent reproductive history:  Age at menarche:    OB History               Para        Term   0            AB        Living             SAB        IAB        Ectopic        Multiple        Live Births   0           Obstetric Comments   Age at first menarche: 13                 Problem List:   Patient Active Problem List   Diagnosis    Abnormal EKG    Abnormal vaginal bleeding    Acquired hypothyroidism    Anxiety    Chest pain, non-cardiac    Hypercholesteremia    Impaired fasting glucose    Palpitations    Pre-diabetes    COVID-19 virus infection    New onset type 2 diabetes mellitus (HCC)    Obstructive sleep apnea syndrome    Sleep related hypoxia    Gas bloat syndrome    Gastroesophageal reflux disease without esophagitis    Rectal bleeding    Irritable bowel syndrome    Malignant neoplasm of lower-outer quadrant of left breast of female, estrogen receptor positive (HCC)     Past Medical History:   Diagnosis Date    Anxiety     COVID-19     in march    Disease of thyroid gland     Fatty liver ?    GERD (gastroesophageal reflux disease) ?     Past Surgical History:   Procedure Laterality Date    BREAST BIOPSY Right     benign    COLONOSCOPY  ?    FRACTURE SURGERY Right     arm    UPPER GASTROINTESTINAL ENDOSCOPY      US GUIDED BREAST BIOPSY LEFT COMPLETE Left 2024    WISDOM TOOTH EXTRACTION       Family History   Problem Relation Age of Onset    Dementia Mother     Heart failure Mother     Diabetes Mother         My Mother passed from congestive heart failure    Hypothyroidism Mother     Cancer Father         Father passed from pancreatic cancer and my sister passed from cancer of the uterus    Pancreatic cancer Father      "Uterine cancer Sister 62    Cancer Sister         Sister passed from cancer of the uterus    Dementia Sister     Hypothyroidism Sister     Hypothyroidism Sister     No Known Problems Sister     Throat cancer Maternal Uncle     No Known Problems Maternal Grandmother     No Known Problems Maternal Grandfather     No Known Problems Paternal Grandmother     No Known Problems Paternal Grandfather     Colon cancer Neg Hx     Colon polyps Neg Hx      Social History     Socioeconomic History    Marital status: /Civil Union     Spouse name: Gulshan \"Paras\"    Number of children: 0    Years of education: Not on file    Highest education level: Not on file   Occupational History    Occupation:    Tobacco Use    Smoking status: Never    Smokeless tobacco: Never   Vaping Use    Vaping status: Never Used   Substance and Sexual Activity    Alcohol use: Yes     Alcohol/week: 6.0 - 8.0 standard drinks of alcohol     Types: 2 - 4 Glasses of wine, 4 Standard drinks or equivalent per week     Comment: socially    Drug use: No    Sexual activity: Not Currently     Partners: Male     Birth control/protection: None   Other Topics Concern    Not on file   Social History Narrative    Not on file     Social Determinants of Health     Financial Resource Strain: Not on file   Food Insecurity: No Food Insecurity (3/13/2024)    Hunger Vital Sign     Worried About Running Out of Food in the Last Year: Never true     Ran Out of Food in the Last Year: Never true   Transportation Needs: No Transportation Needs (3/13/2024)    PRAPARE - Transportation     Lack of Transportation (Medical): No     Lack of Transportation (Non-Medical): No   Physical Activity: Not on file   Stress: Not on file   Social Connections: Not on file   Intimate Partner Violence: Not on file   Housing Stability: Low Risk  (3/13/2024)    Housing Stability Vital Sign     Unable to Pay for Housing in the Last Year: No     Number of Times Moved in the Last Year: 1     " Homeless in the Last Year: No     Current Outpatient Medications   Medication Sig Dispense Refill    levothyroxine 100 mcg tablet Take 1 tablet (100 mcg total) by mouth daily 90 tablet 0    pantoprazole (PROTONIX) 40 mg tablet Take 1 tablet (40 mg total) by mouth daily 90 tablet 0    hydrOXYzine pamoate (VISTARIL) 25 mg capsule Take 1 capsule (25 mg total) by mouth 3 (three) times a day as needed for anxiety (Patient not taking: Reported on 6/10/2024) 60 capsule 2    polyethylene glycol (GLYCOLAX) 17 GM/SCOOP powder Take 17 g by mouth daily (Patient not taking: Reported on 6/10/2024) 255 g 0     No current facility-administered medications for this visit.     Allergies   Allergen Reactions    Sulfa Antibiotics Anaphylaxis    Statins Hives         The following portions of the patient's history were reviewed and updated as appropriate: allergies, current medications, past family history, past medical history, past social history, past surgical history, and problem list.    Review of Systems:  Review of Systems   Constitutional: Negative.  Negative for appetite change, fever and unexpected weight change.   HENT:  Positive for sore throat. Negative for trouble swallowing.    Eyes: Negative.    Respiratory: Negative.  Negative for cough and shortness of breath.    Cardiovascular: Negative.  Negative for chest pain.   Gastrointestinal: Negative.  Negative for abdominal pain, nausea and vomiting.   Endocrine: Negative.    Genitourinary: Negative.  Negative for dysuria.   Musculoskeletal: Negative.  Negative for arthralgias and myalgias.   Skin: Negative.    Allergic/Immunologic: Negative.    Neurological:  Positive for dizziness. Negative for headaches.   Hematological: Negative.  Negative for adenopathy. Does not bruise/bleed easily.   Psychiatric/Behavioral: Negative.         Physical Exam:  Physical Exam  Constitutional:       General: She is not in acute distress.     Appearance: Normal appearance. She is  well-developed.   HENT:      Head: Normocephalic and atraumatic.   Cardiovascular:      Heart sounds: Normal heart sounds.   Pulmonary:      Breath sounds: Normal breath sounds.   Chest:   Breasts:     Right: No swelling, bleeding, inverted nipple, mass, nipple discharge, skin change or tenderness.      Left: Mass, skin change (resolving ecchymosis) and tenderness present. No swelling, bleeding, inverted nipple or nipple discharge.       Abdominal:      Palpations: Abdomen is soft.   Musculoskeletal:      Right lower leg: No edema.      Left lower leg: No edema.   Lymphadenopathy:      Upper Body:      Right upper body: No supraclavicular, axillary or pectoral adenopathy.      Left upper body: No supraclavicular, axillary or pectoral adenopathy.   Neurological:      Mental Status: She is alert and oriented to person, place, and time.   Psychiatric:         Mood and Affect: Mood normal.         Laboratory:  2024 core biopsy left breast 4:00    Pathology revealed: invasive ductal carcinoma    Histologic grade: moderately differentiated     Tumor node status:  Negative    Hormone receptor status: ER/OR both greater than 95%, HER2 was 2+ on IHC but negative on FISH analysis    Other studies: Genetic testing is pending    Imagin2024 bilateral 3D diagnostic mammogram and left breast ultrasound there is an irregular mass lower outer left breast corresponding to the palpable mass measuring 7 mm on ultrasound, normal-appearing lymph nodes, right side is benign, density is a 2      2024 postbiopsy mammogram is concordant, Jyoti reflector in place        Discussion/Summary: 63-year-old female who presents with newly diagnosed carcinoma of the left breast.  She palpated a mass and had diagnostic imaging with a biopsy.  She denies any family history of breast cancers.  She does have additional cancer in the family and submitted a sample for genetic testing.  She does not think that she would opt for bilateral  mastectomy if she carries a gene mutation.  I therefore talked to her about breast conservation.  Based on exam and imaging, she is a good candidate for a lumpectomy.  She does have a Jyoti reflector in place.  I counseled her on a JYOTI localized lumpectomy of the left breast along with lymphatic mapping and sentinel node biopsy.  She understands that she would be referred to radiation oncology for radiation therapy and also medical oncology for at least adjuvant hormone therapy.  All of her questions were answered.  Consent was signed today in the office.  She will be scheduled for surgery in the near term.

## 2024-06-10 NOTE — PROGRESS NOTES
Pt was identified as eligible for the [employer] 2021-05 study. Pt was seen by Dr. Nails at the [facility] for her newly diagnosed breast cancer. Clinical Research Coordinator met with the patient to discuss the trial, including the risks, benefits, and alternatives. Pt had the opportunity to ask questions and all were answered to their satisfaction. Pt was agreeable to consent and signed the consent form along with the CRC. Blood was drawn at 2:45 by Clinical Research Associate Vanessa Leslie. Pt was provided copies of the signed consent and completed W-9 form, along with my business card should she have any questions.

## 2024-06-10 NOTE — H&P (VIEW-ONLY)
Breast Consultation-Surgical Oncology     [address]  [facility]  [address]  [facility] [address]    Name:  Pascale Morgan  YOB: 1960  MRN:  [id number]    Assessment & Plan   Diagnoses and all orders for this visit:    Malignant neoplasm of lower-outer quadrant of left breast of female, estrogen receptor positive (HCC)  -     Case request operating room: LEFT BREAST IDRIS LOCALIZED LUMPECTOMY, BIOPSY LYMPH NODE SENTINEL; Standing  -     UA w Reflex to Microscopic w Reflex to Culture; Future  -     Comprehensive metabolic panel; Future  -     CBC and differential; Future  -     NM lymphatic breast; Future  -     Comprehensive metabolic panel  -     CBC and differential  -     Case request operating room: LEFT BREAST IDRIS LOCALIZED LUMPECTOMY, BIOPSY LYMPH NODE SENTINEL    Other orders  -     Incentive spirometry; Standing  -     Insert and maintain IV line; Standing  -     Void On-Call to O.R.; Standing  -     Place sequential compression device; Standing          HPI: Pascale Morgan is a 63 y.o. year old female who presents with newly diagnosed carcinoma of the left breast.  She reports feeling a mass in the beginning of the year.  She eventually had a diagnostic mammogram with biopsy.  She has family history of pancreatic cancer in her father and uterine cancer in her sister.  She submitted a sample for genetic testing which is currently pending.    Surgical treatment to date consisted of not applicable.    Oncology History:    Oncology History   Malignant neoplasm of lower-outer quadrant of left breast of female, estrogen receptor positive (HCC)   5/23/2024 Biopsy    USG biopsy  A.  Left breast, 4:00, 4 cm from nipple:     - Invasive breast carcinoma of no special type (ductal NST/invasive ductal carcinoma).       -- Tumor present in 3 of 3 tissue cores with largest measurable size of 9 mm.       -- mBR Grade:  Grade 2 of 3     - In situ component:   Cannot exclude grade 2 DCIS with necrosis.      - Lymph-vascular invasion:  Focus suspicious for lymph-vascular invasion.     2024 Initial Diagnosis    Malignant neoplasm of lower-outer quadrant of left breast of female, estrogen receptor positive (HCC)         Pertinent reproductive history:  Age at menarche:    OB History               Para        Term   0            AB        Living             SAB        IAB        Ectopic        Multiple        Live Births   0           Obstetric Comments   Age at first menarche: 13                 Problem List:   Patient Active Problem List   Diagnosis    Abnormal EKG    Abnormal vaginal bleeding    Acquired hypothyroidism    Anxiety    Chest pain, non-cardiac    Hypercholesteremia    Impaired fasting glucose    Palpitations    Pre-diabetes    COVID-19 virus infection    New onset type 2 diabetes mellitus (HCC)    Obstructive sleep apnea syndrome    Sleep related hypoxia    Gas bloat syndrome    Gastroesophageal reflux disease without esophagitis    Rectal bleeding    Irritable bowel syndrome    Malignant neoplasm of lower-outer quadrant of left breast of female, estrogen receptor positive (HCC)     Past Medical History:   Diagnosis Date    Anxiety     COVID-19     in march    Disease of thyroid gland     Fatty liver ?    GERD (gastroesophageal reflux disease) ?     Past Surgical History:   Procedure Laterality Date    BREAST BIOPSY Right     benign    COLONOSCOPY  ?    FRACTURE SURGERY Right     arm    UPPER GASTROINTESTINAL ENDOSCOPY      US GUIDED BREAST BIOPSY LEFT COMPLETE Left 2024    WISDOM TOOTH EXTRACTION       Family History   Problem Relation Age of Onset    Dementia Mother     Heart failure Mother     Diabetes Mother         My Mother passed from congestive heart failure    Hypothyroidism Mother     Cancer Father         Father passed from pancreatic cancer and my sister passed from cancer of the uterus    Pancreatic cancer Father      "Uterine cancer Sister 62    Cancer Sister         Sister passed from cancer of the uterus    Dementia Sister     Hypothyroidism Sister     Hypothyroidism Sister     No Known Problems Sister     Throat cancer Maternal Uncle     No Known Problems Maternal Grandmother     No Known Problems Maternal Grandfather     No Known Problems Paternal Grandmother     No Known Problems Paternal Grandfather     Colon cancer Neg Hx     Colon polyps Neg Hx      Social History     Socioeconomic History    Marital status: /Civil Union     Spouse name: Gulshan \"Paras\"    Number of children: 0    Years of education: Not on file    Highest education level: Not on file   Occupational History    Occupation:    Tobacco Use    Smoking status: Never    Smokeless tobacco: Never   Vaping Use    Vaping status: Never Used   Substance and Sexual Activity    Alcohol use: Yes     Alcohol/week: 6.0 - 8.0 standard drinks of alcohol     Types: 2 - 4 Glasses of wine, 4 Standard drinks or equivalent per week     Comment: socially    Drug use: No    Sexual activity: Not Currently     Partners: Male     Birth control/protection: None   Other Topics Concern    Not on file   Social History Narrative    Not on file     Social Determinants of Health     Financial Resource Strain: Not on file   Food Insecurity: No Food Insecurity (3/13/2024)    Hunger Vital Sign     Worried About Running Out of Food in the Last Year: Never true     Ran Out of Food in the Last Year: Never true   Transportation Needs: No Transportation Needs (3/13/2024)    PRAPARE - Transportation     Lack of Transportation (Medical): No     Lack of Transportation (Non-Medical): No   Physical Activity: Not on file   Stress: Not on file   Social Connections: Not on file   Intimate Partner Violence: Not on file   Housing Stability: Low Risk  (3/13/2024)    Housing Stability Vital Sign     Unable to Pay for Housing in the Last Year: No     Number of Times Moved in the Last Year: 1     " Homeless in the Last Year: No     Current Outpatient Medications   Medication Sig Dispense Refill    levothyroxine 100 mcg tablet Take 1 tablet (100 mcg total) by mouth daily 90 tablet 0    pantoprazole (PROTONIX) 40 mg tablet Take 1 tablet (40 mg total) by mouth daily 90 tablet 0    hydrOXYzine pamoate (VISTARIL) 25 mg capsule Take 1 capsule (25 mg total) by mouth 3 (three) times a day as needed for anxiety (Patient not taking: Reported on 6/10/2024) 60 capsule 2    polyethylene glycol (GLYCOLAX) 17 GM/SCOOP powder Take 17 g by mouth daily (Patient not taking: Reported on 6/10/2024) 255 g 0     No current facility-administered medications for this visit.     Allergies   Allergen Reactions    Sulfa Antibiotics Anaphylaxis    Statins Hives         The following portions of the patient's history were reviewed and updated as appropriate: allergies, current medications, past family history, past medical history, past social history, past surgical history, and problem list.    Review of Systems:  Review of Systems   Constitutional: Negative.  Negative for appetite change, fever and unexpected weight change.   HENT:  Positive for sore throat. Negative for trouble swallowing.    Eyes: Negative.    Respiratory: Negative.  Negative for cough and shortness of breath.    Cardiovascular: Negative.  Negative for chest pain.   Gastrointestinal: Negative.  Negative for abdominal pain, nausea and vomiting.   Endocrine: Negative.    Genitourinary: Negative.  Negative for dysuria.   Musculoskeletal: Negative.  Negative for arthralgias and myalgias.   Skin: Negative.    Allergic/Immunologic: Negative.    Neurological:  Positive for dizziness. Negative for headaches.   Hematological: Negative.  Negative for adenopathy. Does not bruise/bleed easily.   Psychiatric/Behavioral: Negative.         Physical Exam:  Physical Exam  Constitutional:       General: She is not in acute distress.     Appearance: Normal appearance. She is  well-developed.   HENT:      Head: Normocephalic and atraumatic.   Cardiovascular:      Heart sounds: Normal heart sounds.   Pulmonary:      Breath sounds: Normal breath sounds.   Chest:   Breasts:     Right: No swelling, bleeding, inverted nipple, mass, nipple discharge, skin change or tenderness.      Left: Mass, skin change (resolving ecchymosis) and tenderness present. No swelling, bleeding, inverted nipple or nipple discharge.       Abdominal:      Palpations: Abdomen is soft.   Musculoskeletal:      Right lower leg: No edema.      Left lower leg: No edema.   Lymphadenopathy:      Upper Body:      Right upper body: No supraclavicular, axillary or pectoral adenopathy.      Left upper body: No supraclavicular, axillary or pectoral adenopathy.   Neurological:      Mental Status: She is alert and oriented to person, place, and time.   Psychiatric:         Mood and Affect: Mood normal.         Laboratory:  2024 core biopsy left breast 4:00    Pathology revealed: invasive ductal carcinoma    Histologic grade: moderately differentiated     Tumor node status:  Negative    Hormone receptor status: ER/GA both greater than 95%, HER2 was 2+ on IHC but negative on FISH analysis    Other studies: Genetic testing is pending    Imagin2024 bilateral 3D diagnostic mammogram and left breast ultrasound there is an irregular mass lower outer left breast corresponding to the palpable mass measuring 7 mm on ultrasound, normal-appearing lymph nodes, right side is benign, density is a 2      2024 postbiopsy mammogram is concordant, Jyoti reflector in place        Discussion/Summary: 63-year-old female who presents with newly diagnosed carcinoma of the left breast.  She palpated a mass and had diagnostic imaging with a biopsy.  She denies any family history of breast cancers.  She does have additional cancer in the family and submitted a sample for genetic testing.  She does not think that she would opt for bilateral  mastectomy if she carries a gene mutation.  I therefore talked to her about breast conservation.  Based on exam and imaging, she is a good candidate for a lumpectomy.  She does have a Jyoti reflector in place.  I counseled her on a JYOTI localized lumpectomy of the left breast along with lymphatic mapping and sentinel node biopsy.  She understands that she would be referred to radiation oncology for radiation therapy and also medical oncology for at least adjuvant hormone therapy.  All of her questions were answered.  Consent was signed today in the office.  She will be scheduled for surgery in the near term.

## 2024-06-11 ENCOUNTER — TELEPHONE (OUTPATIENT)
Dept: GENETICS | Facility: CLINIC | Age: 64
End: 2024-06-11

## 2024-06-11 NOTE — TELEPHONE ENCOUNTER
STAT Genetic Test Result    Summary:    I left a message with Pascale to review the result of her STAT genetic test.  I encouraged her to call our office back at [phone] to discuss this result further.      [facility] (13 genes): ED, BARD1, BRCA1, BRCA2, CDH1, CHEK2, NF1, PALB2, PTEN, RAD51C, RAD51D, STK11, TP53     Result: Negative - No Clinically Significant Variants Detected      Assessment:     Negative   A negative result significantly reduces the likelihood that Pascale has a hereditary cancer syndrome related to the high-risk breast cancer genes listed above.      This result does not have surgical implications and surgical options should be discussed with her healthcare provider.        Additional Testing:  [facility]: Cancer+RNAinsight (59 genes): APC, ED, AXIN2, BAP1, BARD1, BMPR1A, BRCA1, BRCA2, BRIP1, CDH1, CDK4, CDKN1B, CDKN2A, CHEK2, CTNNA1, DICER1, EGLN1, EPCAM, FH, FLCN, GREM1, HOXB13, KIF1B, KIT, MAX, MEN1, MET, MITF, MLH1, MSH2, MSH3, MSH6, MUTYH, NF1, NTHL1, PALB2, PDGFRA PMS2, POLD1, POLE, POT1, PTEN, RAD51C, RAD51D, RB1, RET, SDHA, SDHAF2, SDHB, SDHC, SDHD, SMAD4, SMARCA4, STK11, LLFP875, TP53, TSC1, TSC2, VHL test is pending.  We will contact Pascale once results are available.  Additional recommendations for surveillance/medical management will be made upon final genetic test result.         Pascale's breast surgeon Dr. Nails was made aware of this test result.

## 2024-06-12 ENCOUNTER — PATIENT OUTREACH (OUTPATIENT)
Dept: CASE MANAGEMENT | Facility: HOSPITAL | Age: 64
End: 2024-06-12

## 2024-06-12 ENCOUNTER — TELEPHONE (OUTPATIENT)
Dept: GENETICS | Facility: CLINIC | Age: 64
End: 2024-06-12

## 2024-06-12 NOTE — PROGRESS NOTES
OSW completed chart review, patient is scheduled for surgery, will outreach for assessment and dt.

## 2024-06-12 NOTE — TELEPHONE ENCOUNTER
Genetic Test Result Note    Summary:    Result Disclosure:   Today I left a voicemail for Pascale reviewing the results of her genetic test for hereditary cancer. She underwent genetic testing through our program on 5/30/2024 due to her recent diagnosis of breast cancer. I encouraged her to call [phone] to discuss this result further.  A copy of this consult note and test result will be shared with the patient.    A separate report of her STAT genetic test results were disclosed to her on 6/11/2024. This result includes new genes and does not change her initial genetic test result.     Test Performed: [facility] BRCAplus STAT Panel (13 genes): ED, BARD1, BRCA1, BRCA2, CDH1, CHEK2, NF1, PALB2, PTEN, RAD51C, RAD51D, STK11, TP53 with reflex to [facility] CustomNext: Cancer+RNAinsight (59 genes): APC, ED, AXIN2, BAP1, BARD1, BMPR1A, BRCA1, BRCA2, BRIP1, CDH1, CDK4, CDKN1B, CDKN2A, CHEK2, CTNNA1, DICER1, EGLN1, EPCAM, FH, FLCN, GREM1, HOXB13, KIF1B, KIT, MAX, MEN1, MET, MITF, MLH1, MSH2, MSH3, MSH6, MUTYH, NF1, NTHL1, PALB2, PDGFRA PMS2, POLD1, POLE, POT1, PTEN, RAD51C, RAD51D, RB1, RET, SDHA, SDHAF2, SDHB, SDHC, SDHD, SMAD4, SMARCA4, STK11, [id number], TP53, TSC1, TSC2, VHL     Result: Negative - No Clinically Significant Variants Detected     Assessment:   A negative result significantly reduces the likelihood that Pascale has a hereditary cancer syndrome. However, this testing is unable to completely rule out the presence of hereditary cancer. It remains possible that:  There is a variant in an area of a gene which was not tested or there is a variant not detectable due to technical limitations of this test.     There is a variant in another gene that was not included in this test or in a gene not known to be linked to cancer or tumors.   A family member has a genetic variant that the patient did not inherit.   The cancer in the family is sporadic and is related to non-hereditary factors.     Risks and Testing for Family  Members:  Pascale was made aware that all of her first-degree relatives are at increased risk to develop breast cancer based on her recent diagnosis. We recommend that her first-degree relatives make their healthcare providers aware of the family history and discuss their options for screening and risk-reduction.    At this time we do not recommend testing for Pascale 's children based on her negative test result.  Pascale 's children still need to consider the history of cancer on the other side of their family when determining their risks.     If Pascale has any affected family members with a cancer diagnosis, especially at a young age, they may still consider genetic testing. Relatives who wish to pursue genetic testing can reach out to the Cancer Risk and Genetics Program at [phone] to schedule an appointment or visit [email or web address] to identify a local genetic counselor.         Plan:     Negative Result: This result does not have surgical implications and surgical options should be discussed with her healthcare provider. Pascale's breast surgeon, Dr. Nails will be made aware of her results

## 2024-06-13 ENCOUNTER — TELEPHONE (OUTPATIENT)
Dept: HEMATOLOGY ONCOLOGY | Facility: CLINIC | Age: 64
End: 2024-06-13

## 2024-06-13 ENCOUNTER — PATIENT OUTREACH (OUTPATIENT)
Dept: HEMATOLOGY ONCOLOGY | Facility: CLINIC | Age: 64
End: 2024-06-13

## 2024-06-13 ENCOUNTER — TELEPHONE (OUTPATIENT)
Dept: SURGICAL ONCOLOGY | Facility: CLINIC | Age: 64
End: 2024-06-13

## 2024-06-13 NOTE — TELEPHONE ENCOUNTER
Patient Call    Who are you speaking with? Patient    If it is not the patient, are they listed on an active communication consent form? N/A   What is the reason for this call? Patient is requesting a call back to go over the surgery date   Does this require a call back? Yes   If a call back is required, please list best call back number [phone]   If a call back is required, advise that a message will be forwarded to their care team and someone will return their call as soon as possible.   Did you relay this information to the patient? Yes

## 2024-06-13 NOTE — PROGRESS NOTES
"Breast Oncology Nurse Navigator    Patient called asking about possibly moving her surgery date. She stated she is not feeling well. She is complaining of a sore throat, ear ache, and congestion. She said she 'has friends who have been suffering with the same symptoms for about 3 weeks and is concerned it could be the same for her.  She is currently scheduled for surgery on 07/02 and is asking if she could,due to not feeling well, change it to 07/09.   She also inquired about her pathology report. She is inquired about the \"suspicious lymphovascular invasion\"   I spoke with Dr Nails and said it will be looked at on her final path and that's what will count.'    Attempted to call patient back 2x. Left messages with my contact information to call back  " History and Physical - [facility] Internal Medicine    Patient Information: Galindo Mcfarlane 68 y o  female MRN: [id number]  Unit/Bed#: [address] [place] [address] Encounter: [id number]  Admitting Physician: Aditya Hector MD  PCP: Leno Kellogg MD  Date of Admission:  01/30/21        Hospital Problem List:     Principal Problem:    Encephalopathy acute, metabolic  Active Problems:    Hypoglycemia    Coronary artery disease involving coronary bypass graft of native heart without angina pectoris    Chronic combined systolic and diastolic congestive heart failure ([address] )    Type 2 diabetes mellitus with retinopathy and macular edema, with long-term current use of insulin (HCC)    Chronic atrial fibrillation ([facility])    Stage 3 chronic kidney disease    Hypothermia    Uremia    Increased ammonia level    Mixed hyperlipidemia    Obstructive sleep apnea    Disease of thyroid gland    Gastroesophageal reflux disease without esophagitis    Mild cognitive impairment      Assessment/Plan:    Hypoglycemia  Assessment & Plan  Patient presented with altered mental status, noted to have blood sugar of less than 35 on arrival on ED  Initially improved with the D 50 but subsequently dropped again requiring further dextrose  History of similar episode in the past   Patient also claims that she had episodes of blood sugar range of 70s and 90s over last 1 week  Reports compliance with insulin regimen  Appetite fair  · Appears to be improving  · Monitor blood sugar q 2 hours until stabilizes  · Hold Lantus and pre meal NovoLog at present  · Follow up repeat hemoglobin A1c    * Encephalopathy acute, metabolic  Assessment & Plan  Presented with altered mental status, noted to hypoglycemia, hypothermia, azotemia and elevated ammonia level  CT head without any acute abnormality    Neuro exam appears nonfocal  Mental status appears to be improving after correction of hypoglycemia and IV hydration  Currently appears close to baseline  · Monitor mental status  · Supportive care  · Follow-up workup as below      Increased ammonia level  Assessment & Plan  Ammonia level was noted to be elevated at 77  Patient does not appear to have history of or evidence of cirrhosis on prior imaging  LFT with mildly elevated bilirubin and low albumin  INR mildly elevated at 1 4  Patient does report that she takes lactulose on the regular basis due to history of constipation but has not been taking over last 1 week as she ran out her prescription  · Resume lactulose  · Consider repeat ultrasound of right upper quadrant as outpatient    Uremia  Assessment & Plan  Patient was noted to have BUN of 121  Likely secondary to prerenal azotemia  No evidence of GI bleeding  · Status post 1 L of IV fluid in ED  · Will hold diuretics  · Monitor hemoglobin    Hypothermia  Assessment & Plan  Patient was noted to be hypothermic on presentation likely secondary to hypoglycemia and uremia  ?  Cold exposure  No evidence of sepsis  Improving,   · Follow-up blood culture  · TSH  · Monitor    Stage 3 chronic kidney disease  Assessment & Plan  Baseline creatinine appears to be 1 5-2 0  Creatinine appears to be close to baseline  Monitor    Chronic atrial fibrillation (HCC)  Assessment & Plan  Rate controlled, history of sick sinus status post pacemaker  Continue metoprolol  Holding Pradaxa at present awaiting surgery      Type 2 diabetes mellitus with retinopathy and macular edema, with long-term current use of insulin [facility])  Assessment & Plan  Lab Results   Component Value Date    HGBA1C 6 7 (H) 12/01/2020       Recent Labs     01/30/21  1526 01/30/21  1735 01/30/21  1950 01/30/21  2112   POCGLU 86 80 87 180*       Blood Sugar Average: Last 72 hrs:  (P) 185 [id number]     Presented with episode of hypoglycemia, similar episode in the past  Check hemoglobin A1c  Hold Lantus and pre meal NovoLog today  Low-dose insulin sliding scale    Chronic combined systolic and diastolic congestive heart failure [facility])  Assessment & Plan  Wt Readings from Last 3 Encounters:   01/30/21 92 8 kg (204 lb 9 4 oz)   01/15/21 93 kg (205 lb)   12/01/20 105 kg (230 lb 13 2 oz)     Prior echo-8/20 -EF 40-45%, severe aortic stenosis, PA pressure 80 mmHg  Currently appears volume depleted, weight lower than prior baseline  · Monitor daily weight, intake output  · Hold torsemide and metolazone for now  · Patient is awaiting TAVR, schedule on 02/02    Coronary artery disease involving coronary bypass graft of native heart without angina pectoris  Assessment & Plan  Continue metoprolol, statin    Obstructive sleep apnea  Assessment & Plan  History of ADRIAN, intolerant to CPAP  On supplemental oxygen    Mixed hyperlipidemia  Assessment & Plan  On statin    Gastroesophageal reflux disease without esophagitis  Assessment & Plan  Continue PPI    Disease of thyroid gland  Assessment & Plan  On Synthroid  Check TSH          VTE Prophylaxis: Heparin  / sequential compression device   Code Status: Level 1 - Full Code    Anticipated Length of Stay:  Patient will be admitted on an Inpatient basis with an anticipated length of stay of  >2 midnights  Justification for Hospital Stay:  Encephalopathy    Total Time for Visit, including Counseling / Coordination of Care: 45 minutes  Greater than 50% of this total time spent on direct patient counseling and coordination of care  Discussed with patient's son over the phone    Chief Complaint:     Hypoglycemia - Symptomatic (EMS called for an altered mental  Patient arrives with BS <35  Given an amp of dextrose during triage and patient was then able to communicate with staff   Stopped blood thinner recently in prep for oral, cardiac, and bowel surgery )    History of Present Illness:    Tonia Moon is a 68 y o  female with history of CHF , CAD status post CABG, atrial fibrillation status post pacemaker placement, hypertension, dyslipidemia, diabetes mellitus type 2, CKD, hypothyroidism, chronic respiratory failure, who was brought to ED with altered mental status  Patient was found lethargic at home EMS was called  Patient blood sugar was noted to be 68 when patient arrived to ED she was noted to have blood sugar of less than 35, patient received dextrose blood sugar improved transiently but patient subsequently remained somnolent  CT head was unremarkable  EKG chest x-ray did not reveal any acute abnormality  Patient was noted to have elevated BUN, elevated ammonia level  Tylenol, salicylate alcohol level were negative  U tox was negative  ABG revealed metabolic alkalosis with respiratory acidosis  Patient was noted to be hypothermic, no definitive evidence of infection noted  Blood cultures were obtained  While in ED blood sugar again dropped to 60 requiring more D50  Patient received 1 L IV bolus and mental status gradually improved  Patient was subsequently admitted  Currently patient is lying comfortably in bed, able to promptly recognize me due to prior interactions  Patient reported that she had her routine breakfast and took her usual 10 units of NovoLog  Patient reported that she subsequently went to sit in the living room  She does not remember any events after that  She also reported that she has been doing otherwise reasonably okay recently  Denies any changes in the medication except she stopped using blood thinner as she is scheduled to have dental surgery and TAVR on 02/01/2021  She also reported that she has not been using her usual lactulose for about a week as she ran out  She reported her appetite to be fair and also reported compliance with her medications  She also reported that she has been noticing lower blood sugar readings in 70s and 90s over last week or so  Review of Systems:    Review of Systems   Constitutional: Negative for appetite change, chills and fever  HENT: Negative for sore throat and trouble swallowing      Respiratory: Negative for cough, chest tightness and shortness of breath  Cardiovascular: Negative for chest pain, palpitations and leg swelling  Gastrointestinal: Positive for constipation  Negative for abdominal pain, diarrhea, nausea and vomiting  Genitourinary: Negative for dysuria  Neurological: Negative for syncope, speech difficulty, light-headedness and headaches  Psychiatric/Behavioral: Negative for behavioral problems         Past Medical and Surgical History:     Past Medical History:   Diagnosis Date    Arthritis     Atrial flutter ([place] [address] )     Cardiac disease     Carotid artery occlusion     CHF (congestive heart failure) ([place] [address] )     Cholelithiasis     last assessed 8/8/2016    Coronary artery disease     Diabetes mellitus ([place] [address] )     Disease of thyroid gland     Essential hypertension 8/15/2016    GERD (gastroesophageal reflux disease)     History of transfusion     Hyperlipidemia     Hypertension     Long-term insulin use in type 2 diabetes ([place] [address] ) 6/4/2016    Other specified diabetes mellitus with diabetic autonomic (poly)neuropathy ([place] [address] )     Oxygen dependent     3L    Pleural effusion 2008    Pulmonary embolism ([place] [address] )     2008    Renal disorder     Retinopathy     bilat    Sleep apnea     USES 3 LITER O2 CONTINOUIS    Subclavian artery stenosis ([place] [address] )     Umbilical hernia with obstruction, without gangrene     last assessed 7/8/2016       Past Surgical History:   Procedure Laterality Date    ABDOMINAL SURGERY      CARDIAC PACEMAKER PLACEMENT      CARDIAC SURGERY      cabg x 2    CATARACT EXTRACTION      CHOLECYSTECTOMY      COLONOSCOPY      CORONARY ARTERY BYPASS GRAFT  2008    EYE SURGERY      FRACTURE SURGERY Right 2010    shoulder    HERNIA REPAIR      umbilical    NE LAP,CHOLECYSTECTOMY N/A 8/10/2016    Procedure: CHOLECYSTECTOMY LAPAROSCOPIC;  Surgeon: Hilario Guy MD;  [facility];  Service: General     [address] Left 11/4/2016    Procedure: MICRODIRECT LARYNGOSCOPY; LEFT VOCAL FOLD INJECTION ;  Surgeon: Adalid Larsen MD;  [facility];  Service: ENT    DC REPAIR UMBILICAL AHYE,7+E/L,MTXNH N/A 8/10/2016    Procedure: LAPAROSCOPIC REPAIR HERNIA VENTRAL;  Surgeon: Hannah Duron MD;  [facility];  Service: General    [address] / STENTING Right     TRICUSPID VALVE REPLACEMENT      VASCULAR SURGERY      heart valve replacement       Meds/Allergies:    PTA meds:   Prior to Admission Medications   Prescriptions Last Dose Informant Patient Reported? Taking?    Accu-Chek Virgie Plus test strip 2021 at Unknown time Child No Yes   Sig: TEST BLOOD SUGAR 3 TIMES EVERY DAY  DX: E11 65   B-D UF III MINI PEN NEEDLES 31G X 5 MM MISC 2021 at Unknown time Child Yes Yes   Si (four) times a day As directed   COMBIGAN 0 2-0 5 % 2021 at Unknown time Child Yes Yes   Sig: Administer 1 drop to both eyes 2 (two) times a day   Cyanocobalamin (VITAMIN B 12 PO) Not Taking at Unknown time Child Yes No   Sig: Take by mouth daily   Insulin Aspart (NovoLOG PenFill) 100 UNITS/ML cartridge for injection 2021 at Unknown time  Yes Yes   Sig: Inject 10 Units under the skin 3 (three) times a day with meals   Multiple Vitamin (MULTIVITAMIN) tablet Not Taking at Unknown time Child Yes No   Sig: Take 1 tablet by mouth daily   acetaminophen (TYLENOL) 325 mg tablet 2021 at Unknown time  No Yes   Sig: Take 2 tablets (650 mg total) by mouth every 4 (four) hours as needed for mild pain or fever   dabigatran etexilate (PRADAXA) 75 mg capsule 2021 at Unknown time  No Yes   Sig: Take 1 capsule (75 mg total) by mouth every 12 (twelve) hours   famotidine (PEPCID) 20 mg tablet 2021 at Unknown time Child No Yes   Sig: TAKE 1 TABLET BY MOUTH TWICE A DAY   gabapentin (NEURONTIN) 300 mg capsule 2021 at Unknown time  No Yes   Sig: Take 1 capsule (300 mg total) by mouth daily at bedtime   insulin glargine (Basaglar KwikPen) 100 units/mL injection pen 1/29/2021 at Unknown time  No Yes   Sig: Inject 15 Units under the skin daily   lactulose (CHRONULAC) 10 g/15 mL solution Past Month at Unknown time  No Yes   Sig: PLEASE SPECIFY DIRECTIONS, REFILLS AND QUANTITY   levothyroxine (Synthroid) 137 mcg tablet 1/30/2021 at Unknown time Child No Yes   Sig: Take 1 tablet (137 mcg total) by mouth daily   metolazone (ZAROXOLYN) 2 5 mg tablet 1/29/2021 at Unknown time  No Yes   Sig: Take 1 tablet (2 5 mg total) by mouth every other day   metoprolol succinate (TOPROL-XL) 50 mg 24 hr tablet 1/30/2021 at Unknown time  No Yes   Sig: Take 1 tablet (50 mg total) by mouth daily   pantoprazole (PROTONIX) 40 mg tablet 1/30/2021 at Unknown time Child Yes Yes   Sig: Take 40 mg by mouth daily in the early morning    potassium chloride (Klor-Con M10) 10 mEq tablet 1/30/2021 at Unknown time  No Yes   Sig: Take 2 tablets (20 mEq total) by mouth daily   simvastatin (ZOCOR) 20 mg tablet 1/29/2021 at Unknown time Child No Yes   Sig: TAKE 1 TABLET (20MG) BY ORAL ROUTE EVERY DAY IN THE EVENING   torsemide (DEMADEX) 20 mg tablet 1/30/2021 at Unknown time  No Yes   Sig: Take 2 tablets (40 mg total) by mouth daily      Facility-Administered Medications: None       Allergies: Allergies   Allergen Reactions    Bromide Ion [Bromine]      Blurred vision   Has preservative that  Pt cannot use    Other Blisters     Adhesive tape    Timolol      Per pt, allergic to steroid in medication    Tramadol      cognition changes    Ibuprofen Palpitations    Penicillins Rash     History:     Marital Status: Single     Substance Use History:   Social History     Substance and Sexual Activity   Alcohol Use Not Currently    Frequency: Never    Drinks per session: Patient refused    Binge frequency: Never    Comment: only on NEW YEAR'S     Social History     Tobacco Use   Smoking Status Never Smoker   Smokeless Tobacco Never Used     Social History     Substance and Sexual Activity Drug Use Never    Types: Oxycodone       Family History:    Family History   Problem Relation Age of Onset    Heart disease Mother     Breast cancer Maternal Aunt     Heart disease Sister        Physical Exam:     Vitals:   Blood Pressure: 125/62 (01/30/21 1830)  Pulse: 62 (01/30/21 1857)  Temperature: (!) 95 5 °F (35 3 °C) (01/30/21 1715)  Temp Source: Rectal (01/30/21 1715)  Respirations: 19 (01/30/21 1857)  Weight - Scale: 92 8 kg (204 lb 9 4 oz) (01/30/21 1715)  SpO2: 96 % (01/30/21 1857)    Physical Exam  Constitutional:       General: She is not in acute distress  Comments: Chronically ill   HENT:      Head: Normocephalic and atraumatic  Mouth/Throat:      Mouth: Mucous membranes are dry  Neck:      Musculoskeletal: Neck supple  Cardiovascular:      Rate and Rhythm: Normal rate  Heart sounds: Murmur present  Comments: Pacemaker in place  Pulmonary:      Effort: Pulmonary effort is normal  No respiratory distress  Breath sounds: Normal breath sounds  No wheezing or rales  Comments: Diminished but clear  Abdominal:      General: Bowel sounds are normal  There is no distension  Palpations: Abdomen is soft  Tenderness: There is no abdominal tenderness  There is no guarding or rebound  Musculoskeletal: Normal range of motion  Skin:     General: Skin is warm and dry  Findings: No rash  Neurological:      General: No focal deficit present  Mental Status: She is alert  Mental status is at baseline  GCS: GCS eye subscore is 4  GCS verbal subscore is 5  GCS motor subscore is 6  Cranial Nerves: Cranial nerves are intact  Sensory: Sensation is intact  Motor: Motor function is intact  Comments: Forgetful   Psychiatric:         Cognition and Memory: Cognition normal  Memory is impaired  Lab Results: I have personally reviewed pertinent reports        Results from last 7 days   Lab Units 01/30/21  1410   WBC Thousand/uL 5  93   HEMOGLOBIN g/dL 11 3*   HEMATOCRIT % 39 0   PLATELETS Thousands/uL 251   NEUTROS PCT % 65   LYMPHS PCT % 15   MONOS PCT % 16*   EOS PCT % 3     Results from last 7 days   Lab Units 01/30/21  1410   POTASSIUM mmol/L 3 4*   CHLORIDE mmol/L 98*   CO2 mmol/L 37*   BUN mg/dL 121*   CREATININE mg/dL 1 77*   CALCIUM mg/dL 8 2*   ALK PHOS U/L 214*   ALT U/L 23   AST U/L 41     Results from last 7 days   Lab Units 01/30/21  1410   INR  1 43*       Imaging: I have personally reviewed pertinent reports  Xr Chest 1 View Portable    Result Date: 1/30/2021  Narrative: CHEST INDICATION:   ams  COMPARISON:  11/30/2020 EXAM PERFORMED/VIEWS:  XR CHEST PORTABLE FINDINGS:  Left-sided chest wall intracardiac device is identified  Leads are intact  Cardiomediastinal silhouette is within normal limits status post CABG  Vascular congestion  No pneumothorax or pleural effusion  Sternal wires are present  Visualized osseous structures appear otherwise unremarkable for the patient's age  Impression: Vascular congestion  Workstation performed: [id number]     Ct Head Without Contrast    Result Date: 1/30/2021  Narrative: CT BRAIN - WITHOUT CONTRAST INDICATION:   ams  COMPARISON:  11/30/2020 TECHNIQUE:  CT examination of the brain was performed  In addition to axial images, sagittal and coronal 2D reformatted images were created and submitted for interpretation  Radiation dose length product (DLP) for this visit:  1812 mGy-cm   This examination, like all CT scans performed in the [facility], was performed utilizing techniques to minimize radiation dose exposure, including the use of iterative reconstruction and automated exposure control  IMAGE QUALITY:  Diagnostic  FINDINGS: PARENCHYMA: Decreased attenuation is noted in periventricular and subcortical white matter demonstrating an appearance that is statistically most likely to represent mild microangiopathic change  No CT signs of acute infarction    No intracranial mass, mass effect or midline shift  No acute parenchymal hemorrhage  VENTRICLES AND EXTRA-AXIAL SPACES:  Normal for the patient's age  VISUALIZED ORBITS AND PARANASAL SINUSES:  Unremarkable  CALVARIUM AND EXTRACRANIAL SOFT TISSUES:  Normal      Impression: No acute intracranial abnormality  Workstation performed: [id number]       CT head without contrast   Final Result      No acute intracranial abnormality  Workstation performed: [id number]         XR chest 1 view portable   Final Result      Vascular congestion  Workstation performed: [id number]             EKG, Pathology, and Other Studies Reviewed on Admission:   · EKG-ventricular paced rhythm    Allscripts/EPIC Records Reviewed: Yes     ** Please Note: "This note has been constructed using a voice recognition system  Therefore there may be syntax, spelling, and/or grammatical errors   Please call if you have any questions  "**

## 2024-06-13 NOTE — TELEPHONE ENCOUNTER
I called patient and left Pascale a message that I rescheduled her surgery date from 7/2/24 to 7/9/24 as requested. Also moved her post op visit from 7/11/24 to 7/24/24 back in the [place] office. Instructed her to contact me if any further questions regarding surgery date change.     2:39 PM- Returned call to patient regarding clarification of pushing surgery date out to 7/9/24 due to illness. Relayed Dr Nails's recommendation of taking a COVID test and notifying us of results. Patient is aware that anesthesia would want surgery delayed due to current symptoms. She is also aware if COVID + surgery would need to be postponed further. Patient appreciative of call.

## 2024-06-17 ENCOUNTER — PATIENT OUTREACH (OUTPATIENT)
Dept: HEMATOLOGY ONCOLOGY | Facility: CLINIC | Age: 64
End: 2024-06-17

## 2024-06-17 DIAGNOSIS — Z17.0 MALIGNANT NEOPLASM OF LOWER-OUTER QUADRANT OF LEFT BREAST OF FEMALE, ESTROGEN RECEPTOR POSITIVE (HCC): Primary | ICD-10-CM

## 2024-06-17 DIAGNOSIS — C50.512 MALIGNANT NEOPLASM OF LOWER-OUTER QUADRANT OF LEFT BREAST OF FEMALE, ESTROGEN RECEPTOR POSITIVE (HCC): Primary | ICD-10-CM

## 2024-06-17 NOTE — PROGRESS NOTES
Breast Oncology Nurse Navigator    Patient called inquiring about her imaging. She would like be able to view her images of her Mammo and subsequent breast biopsy. She said she is usually able to see other images on TelemetryWeb but unable to view her breast images. Contacted the [facility],images cannot be uploaded to TelemetryWeb. Patient is aware. Advised if she wants to have copies of her images she can request a disk or suggested to ask at her post op visit to view her images. Patient agreed and was appreciative of the information.    Patient has my contact information and is awre she can reach out with any questions.

## 2024-06-17 NOTE — PROGRESS NOTES
Contacted patient as directed by JOSÉ MIGUEL Salazar to schedule medical oncology  . Referrals placed to medical oncology and radiation oncology.    Introduced myself and my role.      Type of appointment-Medical Oncology Consult   Provider-   Tpez-1-  Time-3:00pm  Location-[place]     I also informed patient she would be receiving a separate call from radiation oncology to schedule their consult. Patient had no other questions or concerns at this time. I provided my contact information in case any should arise.

## 2024-06-19 ENCOUNTER — PATIENT OUTREACH (OUTPATIENT)
Dept: CASE MANAGEMENT | Facility: HOSPITAL | Age: 64
End: 2024-06-19

## 2024-06-19 ENCOUNTER — TELEPHONE (OUTPATIENT)
Age: 64
End: 2024-06-19

## 2024-06-19 NOTE — TELEPHONE ENCOUNTER
Patients is calling to go over the results of her test results, please call when ready to discuss  Thank you

## 2024-06-19 NOTE — PROGRESS NOTES
Biopsychosocial and Barriers Assessment    Cancer Diagnosis: breast  Home/Cell Phone: [phone]  Emergency Contact: Gulshan (spouse)  Marital Status:   Interpretation concerns, speaks another language, preferred language: English  Cultural concerns: none  Ability to read or write: yes    Caregiver/Support: patient reports good support system  Children: none  Child/Elder care: none    Housing: house   Home Setup: patient reported that she has no difficulty navigating the home  Lives With: spouse, 3 dogs, 4 cats, 2 birds  Daily Living Activities: independent   Durable Medical Equipment: none  Ambulation: independent    Preferred Pharmacy: [facility]  High co-pays with insurance: no  High co-pays with medication coverage: no  No medication coverage: has coverage     Primary Care Provider: BRIDGER Dewey  Hx of Home Health Care: none  Hx of Short term rehab: none  Mental Health Hx: none  Substance Abuse Hx: none  Employment: works as    [place] Status/Location: no  Ability to pay bills: yes  POA/LW/AD: OSW to send information  Transportation Plan/Concerns: no concerns      What do you know about your Cancer Diagnosis    What has your doctor told you about your cancer diagnosis: breast    What has your doctor told you about your cancer treatment: lumpectomy 7/9/24    What specific concerns do you have about your diagnosis and treatment:  Patient expressed frustrations with process of screenings/imaging.   Patient does express concern over lumps in right breast and that ultra sound hasn't been completed of that breast also. Patient expresses concern that she may have cancer in other breast and it hasn't been investigated also.     Have you been made aware of any hair loss associated with treatment: did not discuss     Additional Comments:  OSW outreached for patient to complete assessment. Patient reported that she has a good support system. Patient does report that she does have a lot of  friends that have cancer, some with stage IV. Sister  from cancer. Patient does report anxiety related to current dx and family/friends with dx.     OSW provided emotional support and validation for anxiety/concerns.

## 2024-06-20 ENCOUNTER — PATIENT OUTREACH (OUTPATIENT)
Dept: HEMATOLOGY ONCOLOGY | Facility: CLINIC | Age: 64
End: 2024-06-20

## 2024-06-20 NOTE — PROGRESS NOTES
Breast Oncology Nurse Navigator    Patient called and left a message at [phone] inquiring about her blood work and genetics results. Attempted to return call at 1415. Left message with contact information to call back.

## 2024-06-21 ENCOUNTER — PATIENT OUTREACH (OUTPATIENT)
Dept: HEMATOLOGY ONCOLOGY | Facility: CLINIC | Age: 64
End: 2024-06-21

## 2024-06-21 NOTE — PROGRESS NOTES
Patient called inquiring about having her blood work completed [facility]. She is not comfortable with going there. Sent message to Office RN, Paula QUINTANA, and said the patient can have it drawn at [facility] and [facility]. Patient gave verbal permission to leave a voicemail if she did not answer.  Called patient back with the above information, left voicemail.     Patient also inquired about having an MRI. She is concerned she did not have one and is advocating to have one performed prior to her scheduled surgery. Explained why sometimes an MRI is indicated for some patients i.e dense breast, size, and a more detailed imaging of the breast. Patient asked if Dr Nails would recommend an MRI on her behalf. Advised patient Dr. Nails is out of the office and will be returning on Tuesday. Patient acknowledged understanding and is okay to wait for decision. Message sent to Dr Nails and her office RN.

## 2024-06-24 ENCOUNTER — APPOINTMENT (OUTPATIENT)
Dept: LAB | Facility: HOSPITAL | Age: 64
End: 2024-06-24
Payer: COMMERCIAL

## 2024-06-24 ENCOUNTER — ANESTHESIA EVENT (OUTPATIENT)
Dept: PERIOP | Facility: HOSPITAL | Age: 64
End: 2024-06-24
Payer: COMMERCIAL

## 2024-06-24 DIAGNOSIS — Z17.0 MALIGNANT NEOPLASM OF LOWER-OUTER QUADRANT OF LEFT BREAST OF FEMALE, ESTROGEN RECEPTOR POSITIVE (HCC): ICD-10-CM

## 2024-06-24 DIAGNOSIS — C50.512 MALIGNANT NEOPLASM OF LOWER-OUTER QUADRANT OF LEFT BREAST OF FEMALE, ESTROGEN RECEPTOR POSITIVE (HCC): ICD-10-CM

## 2024-06-24 LAB
ALBUMIN SERPL BCG-MCNC: 4.4 G/DL (ref 3.5–5)
ALP SERPL-CCNC: 87 U/L (ref 34–104)
ALT SERPL W P-5'-P-CCNC: 27 U/L (ref 7–52)
ANION GAP SERPL CALCULATED.3IONS-SCNC: 9 MMOL/L (ref 4–13)
AST SERPL W P-5'-P-CCNC: 27 U/L (ref 13–39)
BACTERIA UR QL AUTO: NORMAL /HPF
BASOPHILS # BLD MANUAL: 0.07 THOUSAND/UL (ref 0–0.1)
BASOPHILS NFR MAR MANUAL: 1 % (ref 0–1)
BILIRUB SERPL-MCNC: 0.58 MG/DL (ref 0.2–1)
BILIRUB UR QL STRIP: NEGATIVE
BUN SERPL-MCNC: 10 MG/DL (ref 5–25)
CALCIUM SERPL-MCNC: 9.7 MG/DL (ref 8.4–10.2)
CHLORIDE SERPL-SCNC: 105 MMOL/L (ref 96–108)
CLARITY UR: CLEAR
CO2 SERPL-SCNC: 26 MMOL/L (ref 21–32)
COLOR UR: ABNORMAL
CREAT SERPL-MCNC: 0.79 MG/DL (ref 0.6–1.3)
EOSINOPHIL # BLD MANUAL: 0.07 THOUSAND/UL (ref 0–0.4)
EOSINOPHIL NFR BLD MANUAL: 1 % (ref 0–6)
ERYTHROCYTE [DISTWIDTH] IN BLOOD BY AUTOMATED COUNT: 13.7 % (ref 11.6–15.1)
GFR SERPL CREATININE-BSD FRML MDRD: 80 ML/MIN/1.73SQ M
GLUCOSE SERPL-MCNC: 103 MG/DL (ref 65–140)
GLUCOSE UR STRIP-MCNC: NEGATIVE MG/DL
HCT VFR BLD AUTO: 42.5 % (ref 34.8–46.1)
HGB BLD-MCNC: 13.7 G/DL (ref 11.5–15.4)
HGB UR QL STRIP.AUTO: NEGATIVE
HOWELL-JOLLY BOD BLD QL SMEAR: PRESENT
KETONES UR STRIP-MCNC: NEGATIVE MG/DL
LEUKOCYTE ESTERASE UR QL STRIP: ABNORMAL
LYMPHOCYTES # BLD AUTO: 3.14 THOUSAND/UL (ref 0.6–4.47)
LYMPHOCYTES # BLD AUTO: 41 % (ref 14–44)
MCH RBC QN AUTO: 29.3 PG (ref 26.8–34.3)
MCHC RBC AUTO-ENTMCNC: 32.2 G/DL (ref 31.4–37.4)
MCV RBC AUTO: 91 FL (ref 82–98)
MONOCYTES # BLD AUTO: 0.52 THOUSAND/UL (ref 0–1.22)
MONOCYTES NFR BLD: 8 % (ref 4–12)
NEUTROPHILS # BLD MANUAL: 2.75 THOUSAND/UL (ref 1.85–7.62)
NEUTS SEG NFR BLD AUTO: 42 % (ref 43–75)
NITRITE UR QL STRIP: NEGATIVE
NON-SQ EPI CELLS URNS QL MICRO: NORMAL /HPF
PH UR STRIP.AUTO: 6 [PH]
PLATELET # BLD AUTO: 316 THOUSANDS/UL (ref 149–390)
PLATELET BLD QL SMEAR: ADEQUATE
PMV BLD AUTO: 10.5 FL (ref 8.9–12.7)
POTASSIUM SERPL-SCNC: 4.1 MMOL/L (ref 3.5–5.3)
PROT SERPL-MCNC: 7.5 G/DL (ref 6.4–8.4)
PROT UR STRIP-MCNC: NEGATIVE MG/DL
RBC # BLD AUTO: 4.68 MILLION/UL (ref 3.81–5.12)
RBC #/AREA URNS AUTO: NORMAL /HPF
RBC MORPH BLD: PRESENT
SODIUM SERPL-SCNC: 140 MMOL/L (ref 135–147)
SP GR UR STRIP.AUTO: 1.01 (ref 1–1.03)
UROBILINOGEN UR STRIP-ACNC: <2 MG/DL
VARIANT LYMPHS # BLD AUTO: 7 %
WBC # BLD AUTO: 6.55 THOUSAND/UL (ref 4.31–10.16)
WBC #/AREA URNS AUTO: NORMAL /HPF

## 2024-06-24 PROCEDURE — 36415 COLL VENOUS BLD VENIPUNCTURE: CPT

## 2024-06-24 PROCEDURE — 85007 BL SMEAR W/DIFF WBC COUNT: CPT

## 2024-06-24 PROCEDURE — 85027 COMPLETE CBC AUTOMATED: CPT

## 2024-06-24 PROCEDURE — 80053 COMPREHEN METABOLIC PANEL: CPT

## 2024-06-24 PROCEDURE — 81001 URINALYSIS AUTO W/SCOPE: CPT

## 2024-06-26 ENCOUNTER — PATIENT OUTREACH (OUTPATIENT)
Dept: HEMATOLOGY ONCOLOGY | Facility: CLINIC | Age: 64
End: 2024-06-26

## 2024-06-26 ENCOUNTER — TELEPHONE (OUTPATIENT)
Dept: GENETICS | Facility: CLINIC | Age: 64
End: 2024-06-26

## 2024-06-26 ENCOUNTER — TELEPHONE (OUTPATIENT)
Dept: SURGICAL ONCOLOGY | Facility: CLINIC | Age: 64
End: 2024-06-26

## 2024-06-26 DIAGNOSIS — C50.512 MALIGNANT NEOPLASM OF LOWER-OUTER QUADRANT OF LEFT BREAST OF FEMALE, ESTROGEN RECEPTOR POSITIVE (HCC): Primary | ICD-10-CM

## 2024-06-26 DIAGNOSIS — Z17.0 MALIGNANT NEOPLASM OF LOWER-OUTER QUADRANT OF LEFT BREAST OF FEMALE, ESTROGEN RECEPTOR POSITIVE (HCC): Primary | ICD-10-CM

## 2024-06-26 NOTE — PROGRESS NOTES
"Patient called yesterday inquiring about her the status of her MRI. Per Dr Nails \"If she wants one I will not deny her an MRI but I don't think it is necessary. She has one small cancer in a category 2 dense breast so this is not really indicated. Also, she has to be aware that it could delay her surgery-one, we would need to get in scheduled in time and two- if other areas of concern seen then she could be called back for additional imaging +/- biopsy.\"    Spoke with patient she would like to move forward with the MRI. She is aware it may delay surgery and could potentially be called back for additional images and or biopsy.     She is also asking for more information about her lab work that came back abnormal.     "

## 2024-06-26 NOTE — TELEPHONE ENCOUNTER
Called patient, explained to patient that per Dr. Nails the urine had trace leukocytes which is normal and her diff in the CBC was her trend so is normal. Patient verbally understands.     Patient stated she would like the MRI done prior to surgery at the [facility] or first available. If she can not get MRI done prior, she does not want to delay surgery.

## 2024-06-26 NOTE — TELEPHONE ENCOUNTER
Called and spoke with patient and provided number for central scheduling to call and have breast mri set up per her request. She inquired as to wether or not her remainder of genetic test results are back and I told her I would send a message to our clinical team regarding her question. She was appreciative and I instructed her to call if she should have any other questions.

## 2024-06-27 ENCOUNTER — PATIENT OUTREACH (OUTPATIENT)
Dept: HEMATOLOGY ONCOLOGY | Facility: CLINIC | Age: 64
End: 2024-06-27

## 2024-06-27 NOTE — PROGRESS NOTES
Patient left a message about her MRI, she called central scheduling and could not get scheduled until August. Called , spoke with Isma she escalated the case to her leadership team. Requested an appointment prior to her surgery which is scheduled for July 9th.   Called and left message with patient updated her with the information.

## 2024-06-28 ENCOUNTER — PATIENT OUTREACH (OUTPATIENT)
Dept: HEMATOLOGY ONCOLOGY | Facility: CLINIC | Age: 64
End: 2024-06-28

## 2024-07-01 ENCOUNTER — TELEPHONE (OUTPATIENT)
Age: 64
End: 2024-07-01

## 2024-07-01 ENCOUNTER — PATIENT MESSAGE (OUTPATIENT)
Dept: FAMILY MEDICINE CLINIC | Facility: CLINIC | Age: 64
End: 2024-07-01

## 2024-07-01 ENCOUNTER — PATIENT OUTREACH (OUTPATIENT)
Dept: HEMATOLOGY ONCOLOGY | Facility: CLINIC | Age: 64
End: 2024-07-01

## 2024-07-01 RX ORDER — ACETAMINOPHEN 500 MG
500 TABLET ORAL EVERY 6 HOURS PRN
COMMUNITY

## 2024-07-01 NOTE — PRE-PROCEDURE INSTRUCTIONS
Pre-Surgery Instructions:   Medication Instructions    acetaminophen (TYLENOL) 500 mg tablet Uses PRN- OK to take day of surgery    levothyroxine 100 mcg tablet Take day of surgery.    pantoprazole (PROTONIX) 40 mg tablet Take day of surgery.    Medication instructions for day surgery reviewed. Please use only a sip of water to take your instructed medications. Avoid all over the counter vitamins, supplements and NSAIDS for one week prior to surgery per anesthesia guidelines. Tylenol is ok to take as needed.     You will receive a call one business day prior to surgery with an arrival time and hospital directions. If your surgery is scheduled on a Monday, the hospital will be calling you on the Friday prior to your surgery. If you have not heard from anyone by 8pm, please call the hospital supervisor through the hospital  at [phone]. ([place] [phone] or [place] [phone]).    Do not eat or drink anything after midnight the night before your surgery, including candy, mints, lifesavers, or chewing gum. Do not drink alcohol 24hrs before your surgery. Try not to smoke at least 24hrs before your surgery.       Follow the pre surgery showering instructions as listed in the “My Surgical Experience Booklet” or otherwise provided by your surgeon's office. Do not use a blade to shave the surgical area 1 week before surgery. It is okay to use a clean electric clippers up to 24 hours before surgery. Do not apply any lotions, creams, including makeup, cologne, deodorant, or perfumes after showering on the day of your surgery. Do not use dry shampoo, hair spray, hair gel, or any type of hair products.     No contact lenses, eye make-up, or artificial eyelashes. Remove nail polish, including gel polish, and any artificial, gel, or acrylic nails if possible. Remove all jewelry including rings and body piercing jewelry.     Wear causal clothing that is easy to take on and off. Consider your type of  surgery.    Keep any valuables, jewelry, piercings at home. Please bring any specially ordered equipment (sling, braces) if indicated.    Arrange for a responsible person to drive you to and from the hospital on the day of your surgery. Please confirm the visitor policy for the day of your procedure when you receive your phone call with an arrival time.     Call the surgeon's office with any new illnesses, exposures, or additional questions prior to surgery.    Please reference your “My Surgical Experience Booklet” for additional information to prepare for your upcoming surgery.

## 2024-07-01 NOTE — PROGRESS NOTES
I received a request from Dr. Nails to see if I can schedule patient's breast mri before her scheduled surgery on 7-9-2024. I reached out to our auth department and they were able to get visit authorized . I spoke with [facility] and I was offered a appointment for 7-1-2024. In the mean time , another patient was placed into that spot. I spoke with Barak from central scheduling and he stated they would work on getting patient in . I informed him I would follow up on Monday .

## 2024-07-01 NOTE — PROGRESS NOTES
I checked patient's chart and seen she has been scheduled for her breast mri for 7-2-2024 @ 5:15pm [facility]. I called patient to confirm she received testing date and time. Patient did confirm she was contacted. I provided directions of testing to patient . I also confirmed her upcoming consult with Medical and Radiation Oncology . Patient was unsure what each doctor was for. I explained the rolls that Medical and Radiation oncology plays for her treatment. She discussed her requesting her mri to be done. Patient also asked how we know it is cancer. I explained the way we determine if a patient has cancer. She also stated she is unsure what her diagnoses is and wanted to know her diagnoses with receptor. I informed her I would send this over to ONN and she would give a call back. Patient was ok and had no other questions or concerns at this time. I encouraged her to call should any arise.

## 2024-07-01 NOTE — PROGRESS NOTES
Breast Oncology Nurse Navigator    Received message from Huyen MOSES, to call patient to review instructions for her MRI review her pathology report with her. Reviewed MRi with her, patient is aware of time and location of appointment. Patient asked about pain in her leg that she has had for a few months, recommended she call her PCP and let them know.  She agreed.

## 2024-07-01 NOTE — TELEPHONE ENCOUNTER
Patient was not seen for a leg injury and insurance most likely wound not cover since she was not seen and there is no documentation regarding injury.

## 2024-07-01 NOTE — TELEPHONE ENCOUNTER
Patient called stating that she has been having pain in her R leg which she had injured a few month prior.  Patient is having a breast MRI tomorrow and would like to know if she can have a script also for MRI of R leg so she can have both done at same time.  Please advise and contact patient.

## 2024-07-01 NOTE — TELEPHONE ENCOUNTER
Pt is having right leg pain below knee can't sleep. Pt is going in for mri for breast tomorrow and would like leg MRI if appropriate   please follow up with pt

## 2024-07-02 ENCOUNTER — HOSPITAL ENCOUNTER (OUTPATIENT)
Dept: MRI IMAGING | Facility: HOSPITAL | Age: 64
Discharge: HOME | End: 2024-07-02
Attending: SURGERY
Payer: COMMERCIAL

## 2024-07-02 VITALS — BODY MASS INDEX: 36.88 KG/M2 | HEIGHT: 64 IN | WEIGHT: 216 LBS

## 2024-07-02 DIAGNOSIS — C50.512 MALIGNANT NEOPLASM OF LOWER-OUTER QUADRANT OF LEFT BREAST OF FEMALE, ESTROGEN RECEPTOR POSITIVE (HCC): ICD-10-CM

## 2024-07-02 DIAGNOSIS — Z17.0 MALIGNANT NEOPLASM OF LOWER-OUTER QUADRANT OF LEFT BREAST OF FEMALE, ESTROGEN RECEPTOR POSITIVE (HCC): ICD-10-CM

## 2024-07-02 PROCEDURE — 77049 MRI BREAST C-+ W/CAD BI: CPT

## 2024-07-02 PROCEDURE — C8937 CAD BREAST MRI: HCPCS

## 2024-07-02 PROCEDURE — C8908 MRI W/O FOL W/CONT, BREAST,: HCPCS

## 2024-07-02 RX ORDER — GADOBUTROL 604.72 MG/ML
9 INJECTION INTRAVENOUS
Status: DISCONTINUED | OUTPATIENT
Start: 2024-07-02 | End: 2024-07-06 | Stop reason: HOSPADM

## 2024-07-02 NOTE — TELEPHONE ENCOUNTER
I sent a response to this message yesterday.   Patient was not seen for a leg injury and insurance most likely wound not cover since she was not seen and there is no documentation regarding injury.

## 2024-07-02 NOTE — TELEPHONE ENCOUNTER
Message reviewed by patient, sent [employer] message back for pt to schedule appt for evaluation.

## 2024-07-09 ENCOUNTER — HOSPITAL ENCOUNTER (OUTPATIENT)
Dept: NUCLEAR MEDICINE | Facility: HOSPITAL | Age: 64
Discharge: HOME | End: 2024-07-09
Attending: SURGERY
Payer: COMMERCIAL

## 2024-07-09 ENCOUNTER — HOSPITAL ENCOUNTER (OUTPATIENT)
Facility: HOSPITAL | Age: 64
Setting detail: OUTPATIENT SURGERY
Discharge: HOME | End: 2024-07-09
Attending: SURGERY | Admitting: SURGERY
Payer: COMMERCIAL

## 2024-07-09 ENCOUNTER — HOSPITAL ENCOUNTER (OUTPATIENT)
Dept: MAMMOGRAPHY | Facility: HOSPITAL | Age: 64
Discharge: HOME | End: 2024-07-09
Payer: COMMERCIAL

## 2024-07-09 ENCOUNTER — ANESTHESIA (OUTPATIENT)
Dept: PERIOP | Facility: HOSPITAL | Age: 64
End: 2024-07-09
Payer: COMMERCIAL

## 2024-07-09 VITALS
SYSTOLIC BLOOD PRESSURE: 118 MMHG | TEMPERATURE: 97.6 F | RESPIRATION RATE: 16 BRPM | WEIGHT: 215.83 LBS | BODY MASS INDEX: 36.85 KG/M2 | HEART RATE: 69 BPM | DIASTOLIC BLOOD PRESSURE: 73 MMHG | OXYGEN SATURATION: 93 % | HEIGHT: 64 IN

## 2024-07-09 DIAGNOSIS — C50.512 MALIGNANT NEOPLASM OF LOWER-OUTER QUADRANT OF LEFT BREAST OF FEMALE, ESTROGEN RECEPTOR POSITIVE (HCC): ICD-10-CM

## 2024-07-09 DIAGNOSIS — Z17.0 MALIGNANT NEOPLASM OF LOWER-OUTER QUADRANT OF LEFT BREAST OF FEMALE, ESTROGEN RECEPTOR POSITIVE (HCC): ICD-10-CM

## 2024-07-09 PROCEDURE — 38900 IO MAP OF SENT LYMPH NODE: CPT | Performed by: SURGERY

## 2024-07-09 PROCEDURE — A9541 TC99M SULFUR COLLOID: HCPCS

## 2024-07-09 PROCEDURE — 19301 PARTIAL MASTECTOMY: CPT | Performed by: SURGERY

## 2024-07-09 PROCEDURE — 88305 TISSUE EXAM BY PATHOLOGIST: CPT | Performed by: STUDENT IN AN ORGANIZED HEALTH CARE EDUCATION/TRAINING PROGRAM

## 2024-07-09 PROCEDURE — 38525 BIOPSY/REMOVAL LYMPH NODES: CPT | Performed by: SURGERY

## 2024-07-09 PROCEDURE — 78195 LYMPH SYSTEM IMAGING: CPT

## 2024-07-09 PROCEDURE — 88307 TISSUE EXAM BY PATHOLOGIST: CPT | Performed by: STUDENT IN AN ORGANIZED HEALTH CARE EDUCATION/TRAINING PROGRAM

## 2024-07-09 PROCEDURE — 76098 X-RAY EXAM SURGICAL SPECIMEN: CPT | Performed by: SURGERY

## 2024-07-09 RX ORDER — ONDANSETRON 2 MG/ML
4 INJECTION INTRAMUSCULAR; INTRAVENOUS EVERY 6 HOURS PRN
Status: DISCONTINUED | OUTPATIENT
Start: 2024-07-09 | End: 2024-07-09 | Stop reason: HOSPADM

## 2024-07-09 RX ORDER — ISOSULFAN BLUE 50 MG/5ML
INJECTION, SOLUTION SUBCUTANEOUS AS NEEDED
Status: DISCONTINUED | OUTPATIENT
Start: 2024-07-09 | End: 2024-07-09 | Stop reason: HOSPADM

## 2024-07-09 RX ORDER — TRAMADOL HYDROCHLORIDE 50 MG/1
50 TABLET ORAL EVERY 6 HOURS PRN
Status: DISCONTINUED | OUTPATIENT
Start: 2024-07-09 | End: 2024-07-09 | Stop reason: HOSPADM

## 2024-07-09 RX ORDER — SODIUM CHLORIDE 9 MG/ML
125 INJECTION, SOLUTION INTRAVENOUS CONTINUOUS
Status: DISCONTINUED | OUTPATIENT
Start: 2024-07-09 | End: 2024-07-09 | Stop reason: HOSPADM

## 2024-07-09 RX ORDER — ONDANSETRON 2 MG/ML
4 INJECTION INTRAMUSCULAR; INTRAVENOUS ONCE AS NEEDED
Status: DISCONTINUED | OUTPATIENT
Start: 2024-07-09 | End: 2024-07-09 | Stop reason: HOSPADM

## 2024-07-09 RX ORDER — FENTANYL CITRATE 50 UG/ML
INJECTION, SOLUTION INTRAMUSCULAR; INTRAVENOUS AS NEEDED
Status: DISCONTINUED | OUTPATIENT
Start: 2024-07-09 | End: 2024-07-09

## 2024-07-09 RX ORDER — DEXAMETHASONE SODIUM PHOSPHATE 10 MG/ML
INJECTION, SOLUTION INTRAMUSCULAR; INTRAVENOUS AS NEEDED
Status: DISCONTINUED | OUTPATIENT
Start: 2024-07-09 | End: 2024-07-09

## 2024-07-09 RX ORDER — BUPIVACAINE HYDROCHLORIDE 5 MG/ML
INJECTION, SOLUTION EPIDURAL; INTRACAUDAL AS NEEDED
Status: DISCONTINUED | OUTPATIENT
Start: 2024-07-09 | End: 2024-07-09 | Stop reason: HOSPADM

## 2024-07-09 RX ORDER — PROMETHAZINE HYDROCHLORIDE 25 MG/ML
6.25 INJECTION, SOLUTION INTRAMUSCULAR; INTRAVENOUS ONCE AS NEEDED
Status: DISCONTINUED | OUTPATIENT
Start: 2024-07-09 | End: 2024-07-09 | Stop reason: HOSPADM

## 2024-07-09 RX ORDER — KETOROLAC TROMETHAMINE 30 MG/ML
INJECTION, SOLUTION INTRAMUSCULAR; INTRAVENOUS AS NEEDED
Status: DISCONTINUED | OUTPATIENT
Start: 2024-07-09 | End: 2024-07-09

## 2024-07-09 RX ORDER — MAGNESIUM HYDROXIDE 1200 MG/15ML
LIQUID ORAL AS NEEDED
Status: DISCONTINUED | OUTPATIENT
Start: 2024-07-09 | End: 2024-07-09 | Stop reason: HOSPADM

## 2024-07-09 RX ORDER — MIDAZOLAM HYDROCHLORIDE 2 MG/2ML
INJECTION, SOLUTION INTRAMUSCULAR; INTRAVENOUS AS NEEDED
Status: DISCONTINUED | OUTPATIENT
Start: 2024-07-09 | End: 2024-07-09

## 2024-07-09 RX ORDER — LIDOCAINE HYDROCHLORIDE 10 MG/ML
INJECTION, SOLUTION EPIDURAL; INFILTRATION; INTRACAUDAL; PERINEURAL AS NEEDED
Status: DISCONTINUED | OUTPATIENT
Start: 2024-07-09 | End: 2024-07-09

## 2024-07-09 RX ORDER — ONDANSETRON 2 MG/ML
4 INJECTION INTRAMUSCULAR; INTRAVENOUS EVERY 6 HOURS PRN
Status: DISCONTINUED | OUTPATIENT
Start: 2024-07-09 | End: 2024-07-09

## 2024-07-09 RX ORDER — MEPERIDINE HYDROCHLORIDE 25 MG/ML
12.5 INJECTION INTRAMUSCULAR; INTRAVENOUS; SUBCUTANEOUS ONCE AS NEEDED
Status: DISCONTINUED | OUTPATIENT
Start: 2024-07-09 | End: 2024-07-09 | Stop reason: HOSPADM

## 2024-07-09 RX ORDER — FENTANYL CITRATE/PF 50 MCG/ML
50 SYRINGE (ML) INJECTION
Status: DISCONTINUED | OUTPATIENT
Start: 2024-07-09 | End: 2024-07-09 | Stop reason: HOSPADM

## 2024-07-09 RX ORDER — ACETAMINOPHEN 10 MG/ML
1000 INJECTION, SOLUTION INTRAVENOUS
Status: COMPLETED | OUTPATIENT
Start: 2024-07-09 | End: 2024-07-09

## 2024-07-09 RX ORDER — HYDROMORPHONE HCL/PF 1 MG/ML
0.5 SYRINGE (ML) INJECTION
Status: DISCONTINUED | OUTPATIENT
Start: 2024-07-09 | End: 2024-07-09 | Stop reason: HOSPADM

## 2024-07-09 RX ORDER — ONDANSETRON 2 MG/ML
INJECTION INTRAMUSCULAR; INTRAVENOUS AS NEEDED
Status: DISCONTINUED | OUTPATIENT
Start: 2024-07-09 | End: 2024-07-09

## 2024-07-09 RX ORDER — PROPOFOL 10 MG/ML
INJECTION, EMULSION INTRAVENOUS AS NEEDED
Status: DISCONTINUED | OUTPATIENT
Start: 2024-07-09 | End: 2024-07-09

## 2024-07-09 RX ORDER — CEFAZOLIN SODIUM 2 G/50ML
2000 SOLUTION INTRAVENOUS ONCE
Status: COMPLETED | OUTPATIENT
Start: 2024-07-09 | End: 2024-07-09

## 2024-07-09 RX ADMIN — ONDANSETRON 4 MG: 2 INJECTION INTRAMUSCULAR; INTRAVENOUS at 10:41

## 2024-07-09 RX ADMIN — ONDANSETRON 4 MG: 2 INJECTION INTRAMUSCULAR; INTRAVENOUS at 13:16

## 2024-07-09 RX ADMIN — SODIUM CHLORIDE: 0.9 INJECTION, SOLUTION INTRAVENOUS at 10:05

## 2024-07-09 RX ADMIN — DEXAMETHASONE SODIUM PHOSPHATE 10 MG: 10 INJECTION INTRAMUSCULAR; INTRAVENOUS at 09:33

## 2024-07-09 RX ADMIN — SODIUM CHLORIDE 125 ML/HR: 0.9 INJECTION, SOLUTION INTRAVENOUS at 07:50

## 2024-07-09 RX ADMIN — ACETAMINOPHEN 1000 MG: 10 INJECTION INTRAVENOUS at 08:53

## 2024-07-09 RX ADMIN — FENTANYL CITRATE 50 MCG: 50 INJECTION INTRAMUSCULAR; INTRAVENOUS at 09:41

## 2024-07-09 RX ADMIN — FENTANYL CITRATE 50 MCG: 50 INJECTION, SOLUTION INTRAMUSCULAR; INTRAVENOUS at 11:25

## 2024-07-09 RX ADMIN — TRAMADOL HYDROCHLORIDE 50 MG: 50 TABLET, COATED ORAL at 12:06

## 2024-07-09 RX ADMIN — FENTANYL CITRATE 50 MCG: 50 INJECTION INTRAMUSCULAR; INTRAVENOUS at 09:30

## 2024-07-09 RX ADMIN — PROPOFOL 200 MG: 10 INJECTION, EMULSION INTRAVENOUS at 09:21

## 2024-07-09 RX ADMIN — FENTANYL CITRATE 50 MCG: 50 INJECTION, SOLUTION INTRAMUSCULAR; INTRAVENOUS at 11:34

## 2024-07-09 RX ADMIN — KETOROLAC TROMETHAMINE 30 MG: 30 INJECTION, SOLUTION INTRAMUSCULAR; INTRAVENOUS at 10:41

## 2024-07-09 RX ADMIN — LIDOCAINE HYDROCHLORIDE 60 MG: 10 INJECTION, SOLUTION EPIDURAL; INFILTRATION; INTRACAUDAL; PERINEURAL at 09:21

## 2024-07-09 RX ADMIN — CEFAZOLIN SODIUM 2000 MG: 2 SOLUTION INTRAVENOUS at 09:16

## 2024-07-09 RX ADMIN — MIDAZOLAM 2 MG: 1 INJECTION INTRAMUSCULAR; INTRAVENOUS at 09:16

## 2024-07-09 RX ADMIN — FENTANYL CITRATE 100 MCG: 50 INJECTION INTRAMUSCULAR; INTRAVENOUS at 10:08

## 2024-07-09 NOTE — OP NOTE
OPERATIVE REPORT  PATIENT NAME: Pascale Morgan    :  1960  MRN: [id number]  Pt Location: [facility] OR ROOM 05    SURGERY DATE: 2024    Surgeons and Role:     * Alyssia Nails MD - Primary     * Rainer Garrison MD - Assisting    Preop Diagnosis:  Malignant neoplasm of lower-outer quadrant of left breast of female, estrogen receptor positive (HCC) [C50.512, Z17.0]    Post-Op Diagnosis Codes:     * Malignant neoplasm of lower-outer quadrant of left breast of female, estrogen receptor positive (HCC) [C50.512, Z17.0]    Procedure(s):  Left - LEFT BREAST CHERYL LOCALIZED LUMPECTOMY  Left - BX LYMPH NODE SENTINEL;  Injection of blue dye  Use of gamma probe  Use of cheryl   Specimen radiograph    Specimen(s):  ID Type Source Tests Collected by Time Destination   1 : Left breast lumpectomy, suture long latertal, short superior Tissue Breast, Left TISSUE EXAM Alyssia Nails MD 2024 0949    2 : Left breast superior/medial margin, suture marks new margin Tissue Breast, Left TISSUE EXAM Alyssia Nails MD 2024 0953    3 : Left breast - [place] lymph node #1 Tissue Lymph Node, [place] TISSUE EXAM Alyssia Nials MD 2024 1012    4 : Left breast true medial margin-suture marks new margin Tissue Breast, Left TISSUE EXAM Alyssia Nails MD 2024 1016    5 : Left breast - [place] lymph node #2 Tissue Lymph Node, [place] TISSUE EXAM Alyssia Nails MD 2024 1016    6 : Left breast - [place] lymph node #3 Tissue Lymph Node, [place] TISSUE EXAM Alyssia Nails MD 2024 1024        Estimated Blood Loss:   10 mL    Drains:  * No LDAs found *    Anesthesia Type:   General    Operative Indications:  Malignant neoplasm of lower-outer quadrant of left breast of female, estrogen receptor positive (HCC) [C50.512, Z17.0]      Operative Findings:  Cheryl reflector in specimen      Complications:   None    Procedure and Technique:  Pascale is a 64-year-old female who presents with left breast carcinoma.  She was counseled on a CHERYL  localized lumpectomy with sentinel node biopsy.  She presented the day of surgery to the radiology suite.  She underwent lymphoscintigraphy of the left breast.  From there she went to the operating room.  She had preoperative antibiotics.  She was administered general anesthesia.  She had a 5 cc injection of Lymphazurin into the left subareolar plexus.  She was prepped and draped in the usual standard fashion.  Timeout was performed.  Attention was turned to the left breast 4:00 axis.  The Jyoti reflector was identified using the  probe.  The skin was marked in this location.  Half percent Marcaine plain was injected for local anesthesia.  An elliptical incision was created through the skin and subcutaneous tissue.  Electrocautery was used to dissect margins superior, lateral, inferior, medial and posterior.  The savvy probe was used throughout to help guide dissection.  The specimen was marked with a short stitch superior and a long stitch lateral.  This was imaged in the operating room revealing the Jyoti reflector with residual density.  The closest margins appeared to be superior in the medial aspect as well as the true medial margin.  Therefore new margins were excised in these locations and sutures were placed on the true margins.  All breast specimens were submitted to pathology in formalin.  Attention was then turned to the left axilla.  There was an area of increased radioactive uptake at the lower axillary hairline.  Additional half percent Marcaine plain was injected for local anesthesia.  An incision was created through the skin and subcu cutaneous tissue.  Electrocautery was used to dissect through the remaining subcutaneous tissue and clavipectoral fascia to enter the axillary fat pad.  Deep within the mid axilla was a blue stained and radioactive node.  This was elevated into the wound using an Allis clamp.  Hemoclips were placed on superficial draining vessels.  This was excised and submitted as  sentinel node one of the left axilla.  There was a second node that was more medial and superficial in location.  This was also blue stained and radioactive.  This was excised and submitted as sentinel node 2 of the left axilla.  There was an additional area of radioactivity deep within the axilla.  This was elevated into the wound using an Allis clamp.  Hemoclips were again placed on a draining vessel.  This was excised and submitted as sentinel node 3 of the left axilla.  Following removal of this third node, there were no additional blue stained, radioactive or palpable nodes.  Her wounds were irrigated and hemostasis was achieved.  Surgicel powder was placed within the axilla for additional hemostasis.  Hemoclips were placed within the lumpectomy bed for anticipated radiation.  The wounds were then closed in a layered fashion using multiple interrupted 3-0 Monocryl suture and a running 4-0 Monocryl subcuticular stitch.  All counts were correct.  The skin was cleaned and dried.  Surgical glue, fluffs and a bra were applied.  The patient was then extubated and taken to recovery in stable condition.       Patient Disposition:  extubated and stable    [place] Node Biopsy for Breast Cancer - Left  Operation performed with curative intent. Yes   Tracer(s) used to identify sentinel nodes in the upfront surgery (non-neoadjuvant) setting (select all that apply). Dye and Radioactive tracer   Tracer(s) used to identify sentinel nodes in the neoadjuvant setting (select all that apply). N/A   All nodes (colored or non-colored) present at the end of a dye-filled lymphatic channel were removed. Yes   All significantly radioactive nodes were removed. Yes   All palpably suspicious nodes were removed. N/A   Biopsy-proven positive nodes marked with clips prior to chemotherapy were identified and removed. N/A            SIGNATURE: Alyssia Nails MD  DATE: July 9, 2024  TIME: 10:49 AM

## 2024-07-09 NOTE — ANESTHESIA POSTPROCEDURE EVALUATION
Post-Op Assessment Note    CV Status:  Stable    Pain management: adequate       Mental Status:  Alert and awake   Hydration Status:  Euvolemic   PONV Controlled:  Controlled   Airway Patency:  Patent  Two or more mitigation strategies used for obstructive sleep apnea   Post Op Vitals Reviewed: Yes    No anethesia notable event occurred.    Staff: NKECHI               /71 (07/09/24 1110)    Temp (!) 97.3 °F (36.3 °C) (07/09/24 1110)    Pulse 83 (07/09/24 1110)   Resp 14 (07/09/24 1110)    SpO2 96 % (07/09/24 1110)

## 2024-07-09 NOTE — ANESTHESIA PREPROCEDURE EVALUATION
Procedure:  LEFT BREAST IDRIS LOCALIZED LUMPECTOMY (Left: Breast)  BX LYMPH NODE SENTINEL; (Left: Axilla)    Relevant Problems   CARDIO   (+) Chest pain, non-cardiac   (+) Hypercholesteremia      ENDO   (+) Acquired hypothyroidism   (+) New onset type 2 diabetes mellitus (HCC)      GI/HEPATIC   (+) Gastroesophageal reflux disease without esophagitis   (+) Rectal bleeding      GYN   (+) Malignant neoplasm of lower-outer quadrant of left breast of female, estrogen receptor positive (HCC)      NEURO/PSYCH   (+) Anxiety      PULMONARY   (+) Obstructive sleep apnea syndrome        Physical Exam    Airway    Mallampati score: III  TM Distance: >3 FB  Neck ROM: full     Dental   No notable dental hx     Cardiovascular  Cardiovascular exam normal    Pulmonary  Pulmonary exam normal     Other Findings  post-pubertal.      Anesthesia Plan  ASA Score- 2     Anesthesia Type- general with ASA Monitors.         Additional Monitors:     Airway Plan: LMA.           Plan Factors-Exercise tolerance (METS): >4 METS.    Chart reviewed. EKG reviewed.  Existing labs reviewed. Patient summary reviewed.    Patient is not a current smoker.              Induction- intravenous.    Postoperative Plan- Plan for postoperative opioid use.         Informed Consent- Anesthetic plan and risks discussed with patient.  I personally reviewed this patient with the CRNA. Discussed and agreed on the Anesthesia Plan with the CRNA..

## 2024-07-09 NOTE — INTERVAL H&P NOTE
H&P reviewed. After examining the patient I find no changes in the patients condition since the H&P had been written.    Vitals:    07/09/24 0731   BP: 124/64   Pulse: 68   Resp: 16   Temp: 97.5 °F (36.4 °C)   SpO2: 94%

## 2024-07-09 NOTE — DISCHARGE INSTR - AVS FIRST PAGE
POST-OPERATIVE CARE INSTRUCTIONS       Care after your procedure:   General  Rest and relax for 24 hours, then gradually return to normal activities.  Do not preform any heavy lifting or strenuous physical activities for 14 days.  Your activity restrictions will be re-evaluated at your post op visit.  Drink clear liquids until you are certain there is no nausea, then resume a normal diet.  Do not drink alcohol, drive any vehicle, operate mechanical equipment or make critical decisions for at least 24 hours and until you are off any narcotic pain medications.  The Incision  Your incision is closed with:   dissolvable stiches just underneath the skin.                The incision is also covered with:                          clear waterproof glue  A gauze-pad is covering the wound.  Wound care  Remove your gauze-pad after 24 hours.   You may then shower using soap and water to clean your incision. Gently dry the wound.  You may redress your wound with additional gauze and tape if you choose.  A little bruising at the wound site is normal.    Medication  Resume all previous medications  Take either Naproxen (Aleve) one tablet every 8 hours or Ibuprofen(Advil/Motrin) one(1) to two(2) tablets every 6 hours as needed  Pain Medication Instructions: may also  use over the counter tylenol or prescription tramadol if needed          Other (If applicable)  Wear a post-surgical bra around the clock.  May use ice to the incision site(s) for the next 24-48 hours, twice daily.   Call your  doctor if you have any of the following:  Redness, swelling, heat, drainage, and/or bleeding from your wound  Chills or fever ( above 101' F )  Pain, not relieved with the above medications  If you have any questions or problems call our office [phone]    Follow-up appointment:  As scheduled

## 2024-07-10 ENCOUNTER — PATIENT OUTREACH (OUTPATIENT)
Dept: HEMATOLOGY ONCOLOGY | Facility: CLINIC | Age: 64
End: 2024-07-10

## 2024-07-10 NOTE — PROGRESS NOTES
Breast Oncology Nurse Navigator    Patient called in with questions about her pathology results.I explained they are not back yet and should be back by her post op appointment. She asked if the titanium clip was removed during her surgery, I told her it was> She also asked what the HCC next her diagnosis means, I explained I did not know but will ask for her. She then asked about her 3 lymph nodes that were removed and what it means to be estrogen positive for treatment options, I discussed that she would be have consults with medical oncology and they would determine the best treatment for her based of her final anthology results from surgery, that Dr Nails will review the results with her as well at her post op appointment.   She stated she took tramadol for pain, denies fevers, and is icing her breast. Advised patient to call Dr aNils's office if she has any signs or symptoms of infection, becomes febrile, or pain not relieved with medication. Patient acknowledged understanding and agreed

## 2024-07-11 PROCEDURE — 88305 TISSUE EXAM BY PATHOLOGIST: CPT | Performed by: STUDENT IN AN ORGANIZED HEALTH CARE EDUCATION/TRAINING PROGRAM

## 2024-07-11 PROCEDURE — 88307 TISSUE EXAM BY PATHOLOGIST: CPT | Performed by: STUDENT IN AN ORGANIZED HEALTH CARE EDUCATION/TRAINING PROGRAM

## 2024-07-12 ENCOUNTER — PATIENT OUTREACH (OUTPATIENT)
Dept: HEMATOLOGY ONCOLOGY | Facility: CLINIC | Age: 64
End: 2024-07-12

## 2024-07-12 ENCOUNTER — TELEPHONE (OUTPATIENT)
Dept: SURGICAL ONCOLOGY | Facility: CLINIC | Age: 64
End: 2024-07-12

## 2024-07-12 NOTE — TELEPHONE ENCOUNTER
"Called the patient to further discuss her questions after receiving a message from the nurse navigator. The patient had several questions regarding her pathology report; specifically about her positive lymph node. Reviewed her pathology findings of 1/3 sentinel lymph nodes and that only 6 mm of cancer was identified with no extranodal extension. Informed the patient of her margin status and explained that it was unlikely she would require any additional surgery by Dr. Nails. The patient stated that she was \"shocked\" to see her positive lymph node on the pathology report after just having a negative MRI. Explained that this is not an uncommon occurrence and discussed the rationale behind all of her tissue being sent to pathology for analysis after surgery. The patient questioned what adjuvant treatment would be needed. Explained that radiation therapy and an anti-hormone medication would definitely be recommended given her tumor phenotype and operation. Informed the patient that chemotherapy would be discussed at her consult with Dr. Carrero on 7/26. The patient verbalized concern that since her cancer spread to her lymph node, it was in other parts of her body; although, she reported having full body imaging at the beginning of the year. Discussed the role of adjuvant systemic therapy as protection for her body. The patient asked what to look for with signs of infection; this was reviewed with her. She reported redness immediately around her incision but denied fevers or swelling. She was instructed to continue to monitor the area and to contact the office on Monday if the redness spread at all over the weekend. The patient was instructed to not remove any gauze stuck to her incision as previously reported. Explained that she could trim any loose gauze with scissors but to not pull or rub anything that was stuck. The patient verbalized understanding of everything discussed. She is aware that Dr. Nails will address " all of this again at her post-op appointment on 7/24. More than 15 minutes were spent on the phone with the patient. All of the patient's questions have been answered to her satisfaction at this time. She was appreciative of the call back and the discussion.

## 2024-07-12 NOTE — PROGRESS NOTES
Patient called and left a voicemail message  Marie Rios, This is Juan Carlos Morgan calling regarding my results from the biopsy from the lymph nodes and the surgery. I just got it back and I saw that there is in fact cancer in the one lymph node and just kind of makes me nervous and concerned. And I guess my thought process was that they wouldn't have any cancer because I had the MRI and that was all clear and everything. But if you could call me back and let me know what Doctor Nails says about that, I'd appreciate it. Juan Carlos Morgan   [phone].  Messaged Dr Nails and her office RN. Paula    [phone]   Patient called back with questions about her pathology report and had question about what is means to be in her lymph node and has concerns about her path report from surgery, as well as other concerns.  Patient is also stated that the ADB pad is stuck to where her incision is. I advised her not to pull it off. Her  said it was pieces of she strips left over that seemed to be stuck to the incision glue  Message sent to Dr Nails and her RN

## 2024-07-16 DIAGNOSIS — E03.9 HYPOTHYROIDISM, UNSPECIFIED TYPE: ICD-10-CM

## 2024-07-17 DIAGNOSIS — E03.9 HYPOTHYROIDISM, UNSPECIFIED TYPE: Primary | ICD-10-CM

## 2024-07-17 RX ORDER — LEVOTHYROXINE SODIUM 0.1 MG/1
100 TABLET ORAL
Qty: 7 TABLET | Refills: 0 | Status: SHIPPED | OUTPATIENT
Start: 2024-07-17 | End: 2024-07-26

## 2024-07-17 RX ORDER — LEVOTHYROXINE SODIUM 0.1 MG/1
100 TABLET ORAL DAILY
Qty: 100 TABLET | Refills: 1 | Status: SHIPPED | OUTPATIENT
Start: 2024-07-17

## 2024-07-17 NOTE — TELEPHONE ENCOUNTER
Patient needswants emergency supply sent to retail waiting for Dr to approve pending in mail order refill she submitted on my chart       Reason for call:   [x] Refill   [] Prior Auth  [] Other:     Office:   [x] PCP/Provider - Joceline Logan  [] Specialty/Provider -     Medication: Levothyroxine     Dose/Frequency: 100 mcg Daily     Quantity: 7     Pharmacy: [facility] [place]    Does the patient have enough for 3 days?   [] Yes   [x] No - Send as HP to POD

## 2024-07-23 ENCOUNTER — CONSULT (OUTPATIENT)
Facility: HOSPITAL | Age: 64
End: 2024-07-23
Payer: COMMERCIAL

## 2024-07-23 VITALS
SYSTOLIC BLOOD PRESSURE: 123 MMHG | TEMPERATURE: 97.2 F | RESPIRATION RATE: 16 BRPM | HEART RATE: 75 BPM | HEIGHT: 64 IN | WEIGHT: 215 LBS | DIASTOLIC BLOOD PRESSURE: 80 MMHG | OXYGEN SATURATION: 98 % | BODY MASS INDEX: 36.7 KG/M2

## 2024-07-23 DIAGNOSIS — Z17.0 MALIGNANT NEOPLASM OF LOWER-OUTER QUADRANT OF LEFT BREAST OF FEMALE, ESTROGEN RECEPTOR POSITIVE (HCC): Primary | ICD-10-CM

## 2024-07-23 DIAGNOSIS — C50.512 MALIGNANT NEOPLASM OF LOWER-OUTER QUADRANT OF LEFT BREAST OF FEMALE, ESTROGEN RECEPTOR POSITIVE (HCC): Primary | ICD-10-CM

## 2024-07-23 PROCEDURE — 99205 OFFICE O/P NEW HI 60 MIN: CPT | Performed by: RADIOLOGY

## 2024-07-23 NOTE — PROGRESS NOTES
Pascale Morgan  1960  [id number]    Radiation Oncology Consult    May 23, 2024: Bilateral diagnostic mammogram showed an irregular mass in the lower outer left breast at middle depth measuring 7 mm, no pathologic Lymphadenopathy.  No abnormalities on the right.    May 23, 2024: Left breast biopsy showed a grade 2 invasive ductal carcinoma that was estrogen receptor 95%, progesterone receptor 95%, HER2 2+ IHC and negative by FISH    July 2, 2024: Bilateral breast MRI showed biopsy-proven malignancy in the left lower outer breast with enhancement measuring 1.2 cm, no suspicious enhancing masses in the right, no axillary or internal mammary adenopathy seen    July 9, 2024: Left breast lumpectomy and sentinel node biopsy by Dr. Nails.  Pathology revealed a 1.1 cm grade 2 invasive ductal carcinoma with DCIS present and not extensive, lymphovascular invasion focally present, all margins 2 mm or greater, 1 of 3 sentinel nodes with a 6 mm macrometastasis without extranodal extension.    History of present illness: Ms. Morgan is a 64-year-old postmenopausal woman who self palpated a mass in the lower outer left breast in January 2024.  She reported this to her primary physician who reportedly ordered screening mammogram which was scheduled for April 2024.  Upon arrival for this test, patient was told to reschedule as a diagnostic mammogram which was performed on May 23, 2024.  This study confirmed the presence of a mass in the lower outer left breast which measured 7 mm on ultrasound.  A biopsy of the abnormality showed grade 2 invasive ductal carcinoma that was strongly estrogen and progesterone receptor positive, HER2 negative.  She requested an MRI of the breast which was performed and confirmed the presence of a 1.2 cm enhancing mass in the area of known cancer with no other suspicious findings, including no lymphadenopathy seen.  She underwent a lumpectomy and sentinel node biopsy by Dr. Nails on July 9.  Pathology showed  a 1.1 cm grade 2 invasive ductal carcinoma with negative margins of excision, focal lymphovascular invasion present and 1 of 3 sentinel nodes with a macrometastasis.  Pathologic stage pT1c pN1a cM0, prognostic stage IA.  She has done well postoperatively.  She has noted some significant discomfort associated with the axillary incision but has had no wound problems or infection.  She is referred by Dr. Nails for information regarding the role of radiation therapy in the setting of breast conservation treatment for early-stage but node positive left breast cancer.  She is seeing Dr. Carrero in consultation later this week.      Oncology History   Malignant neoplasm of lower-outer quadrant of left breast of female, estrogen receptor positive (HCC)   5/23/2024 Biopsy    Left breast ultrasound-guided biopsy  4 o'clock, 4 cm from nipple (IDRIS)  Invasive carcinoma of no special type (ductal)  Grade 2  ER >95; KS >95; HER2 2+, FISH negative  Lymphovascular invasion cannot be determined; focus suspicious for    Concordant. Malignancy appears unifocal; suspicious masses cover up to 7 mm on US. Left axilla US negative. Right breast clear.     5/23/2024 -  Cancer Staged    Staging form: Breast, AJCC 8th Edition  - Clinical stage from 5/23/2024: Stage IA (cT1b, cN0, cM0, G2, ER+, KS+, HER2-) - Signed by Alyssia Nails MD on 6/10/2024  Stage prefix: Initial diagnosis  Method of lymph node assessment: Clinical  Histologic grading system: 3 grade system       6/2/2024 Genetic Testing    NEGATIVE: No Clinically Significant Variants Detected  A total of 59 genes were evaluated with RNA insight: APC, DE, BAP1, BARD1, BMPR1A, BRCA1, BRCA2, BRIP1, CDH1, CDK4, CDKN1B, CDKN2A, CHEK2, DICER1, FH, FLCN, KIF1B, MAX, MEN1, MET, MLH1, MSH2, MSH6, MUTYH, NF1, NTHL1, PALB2, PMS2, POT1, PTEN, RAD51C, RAD51D, RB1, RET, SDHA, SDHAF2, SDHB, SDHC, SDHD, SMAD4, SMARCA4, STK11, AUSL913, TP53, TSC1, TSC2 and VHL (sequencing and deletion/duplication);  AXIN2, CTNNA1, EGLN1, HOXB13, KIT, MITF, MSH3, PDGFRA, POLD1 and POLE (sequencing only); EPCAM and GREM1 (deletion/duplication only).  [place]     7/2/2024 Observation    Bilateral breast MRI done per patient's request.   IMPRESSION:   Biopsy-proven unifocal malignancy in the lower outer left breast; measures up to 1.2 cm.   No findings of malignancy in the right breast.     7/9/2024 Surgery    Left breast IDRIS localized lumpectomy with SLN biopsy  Invasive carcinoma of no special type (ductal)  Grade 2  1.1 cm  Lymphovascular invasion focally present  Margins negative  1/3 Lymph nodes with macrometastasis (6 mm, extranodal extension not identified)     7/9/2024 -  Cancer Staged    Staging form: Breast, AJCC 8th Edition  - Pathologic stage from 7/9/2024: Stage IA (pT1c, pN1a, cM0, G2, ER+, MA+, HER2-) - Signed by Rosalie Soliman MD on 7/23/2024  Histopathologic type: Infiltrating duct carcinoma, NOS  Stage prefix: Initial diagnosis  Nuclear grade: G2  Multigene prognostic tests performed: None  Histologic grading system: 3 grade system  Laterality: Left  Lymph-vascular invasion (LVI): LVI present/identified, NOS           Patient Active Problem List   Diagnosis    Abnormal EKG    Abnormal vaginal bleeding    Acquired hypothyroidism    Anxiety    Chest pain, non-cardiac    Hypercholesteremia    Impaired fasting glucose    Palpitations    Pre-diabetes    COVID-19 virus infection    New onset type 2 diabetes mellitus (HCC)    Obstructive sleep apnea syndrome    Sleep related hypoxia    Gas bloat syndrome    Gastroesophageal reflux disease without esophagitis    Rectal bleeding    Irritable bowel syndrome    Malignant neoplasm of lower-outer quadrant of left breast of female, estrogen receptor positive (HCC)    Cancer Staging   Malignant neoplasm of lower-outer quadrant of left breast of female, estrogen receptor positive (HCC)  Staging form: Breast, AJCC 8th Edition  - Clinical stage from 5/23/2024: Stage IA (cT1b,  "cN0, cM0, G2, ER+, IN+, HER2-) - Signed by Alyssia Nails MD on 6/10/2024  Stage prefix: Initial diagnosis  Method of lymph node assessment: Clinical  Histologic grading system: 3 grade system  - Pathologic stage from 7/9/2024: Stage IA (pT1c, pN1a, cM0, G2, ER+, IN+, HER2-) - Signed by Rosalie Soliman MD on 7/23/2024  Histopathologic type: Infiltrating duct carcinoma, NOS  Stage prefix: Initial diagnosis  Nuclear grade: G2  Multigene prognostic tests performed: None  Histologic grading system: 3 grade system  Laterality: Left  Lymph-vascular invasion (LVI): LVI present/identified, NOS    Past Medical History:   Diagnosis Date    Anxiety     Cancer (HCC)     Breast cancer    Colon polyp     COVID-19     in march    CPAP (continuous positive airway pressure) dependence     Disease of thyroid gland     Fatty liver ?    GERD (gastroesophageal reflux disease) ?    Hyperlipidemia     Irritable bowel syndrome     Kidney stone     Sleep apnea      Social History     Socioeconomic History    Marital status: /Civil Union     Spouse name: Gulshan \"Paras\"    Number of children: 0    Years of education: Not on file    Highest education level: Not on file   Occupational History    Occupation:    Tobacco Use    Smoking status: Never    Smokeless tobacco: Never   Vaping Use    Vaping status: Never Used   Substance and Sexual Activity    Alcohol use: Yes     Alcohol/week: 6.0 - 8.0 standard drinks of alcohol     Types: 2 - 4 Glasses of wine, 4 Standard drinks or equivalent per week     Comment: socially    Drug use: No    Sexual activity: Not Currently     Partners: Male     Birth control/protection: None   Other Topics Concern    Not on file   Social History Narrative    Not on file     Social Determinants of Health     Financial Resource Strain: Not on file   Food Insecurity: No Food Insecurity (3/13/2024)    Hunger Vital Sign     Worried About Running Out of Food in the Last Year: Never true     Ran Out of Food " in the Last Year: Never true   Transportation Needs: No Transportation Needs (3/13/2024)    PRAPARE - Transportation     Lack of Transportation (Medical): No     Lack of Transportation (Non-Medical): No   Physical Activity: Not on file   Stress: Not on file   Social Connections: Not on file   Intimate Partner Violence: Not on file   Housing Stability: Low Risk  (3/13/2024)    Housing Stability Vital Sign     Unable to Pay for Housing in the Last Year: No     Number of Times Moved in the Last Year: 1     Homeless in the Last Year: No      Family History   Problem Relation Age of Onset    Dementia Mother     Heart failure Mother     Diabetes Mother         My Mother passed from congestive heart failure    Hypothyroidism Mother     Pancreatic cancer Father     Uterine cancer Sister 62    Dementia Sister     Hypothyroidism Sister     Hypothyroidism Sister     No Known Problems Sister     No Known Problems Maternal Grandmother     No Known Problems Maternal Grandfather     No Known Problems Paternal Grandmother     No Known Problems Paternal Grandfather     Throat cancer Maternal Uncle      Past Surgical History:   Procedure Laterality Date    BREAST BIOPSY Right     benign    COLONOSCOPY  2016?    FRACTURE SURGERY Right     arm    RI BX/EXC LYMPH NODE OPEN SUPERFICIAL Left 7/9/2024    Procedure: BX LYMPH NODE SENTINEL;;  Surgeon: Alyssia Nails MD;  Location: [facility];  Service: Surgical Oncology    RI EXC BREAST LES PREOP PLMT RAD MARKER OPEN 1 LES Left 7/9/2024    Procedure: LEFT BREAST IDRIS LOCALIZED LUMPECTOMY;  Surgeon: Alyssia Nails MD;  Location: [facility];  Service: Surgical Oncology    RI INJ RADIOACTIVE TRACER FOR ID OF SENTINEL NODE Left 7/9/2024    Procedure: BX LYMPH NODE SENTINEL;;  Surgeon: Alyssia Nails MD;  Location: [facility];  Service: Surgical Oncology    RI INTRAOP SENTINEL LYMPH NODE ID W/DYE INJECTION Left 7/9/2024    Procedure: BX LYMPH NODE SENTINEL;;  Surgeon: Alyssia Nails MD;  Location: [facility];  Service: Surgical Oncology    ME MASTECTOMY PARTIAL Left 7/9/2024    Procedure: LEFT BREAST IDRIS LOCALIZED LUMPECTOMY;  Surgeon: Alyssia Nails MD;  Location: [facility];  Service: Surgical Oncology    UPPER GASTROINTESTINAL ENDOSCOPY  2021    US GUIDED BREAST BIOPSY LEFT COMPLETE Left 5/23/2024    WISDOM TOOTH EXTRACTION         Current Outpatient Medications:     levothyroxine 100 mcg tablet, Take 1 tablet (100 mcg total) by mouth daily, Disp: 100 tablet, Rfl: 1    levothyroxine 100 mcg tablet, Take 1 tablet (100 mcg total) by mouth daily in the early morning, Disp: 7 tablet, Rfl: 0    pantoprazole (PROTONIX) 40 mg tablet, Take 1 tablet (40 mg total) by mouth daily, Disp: 90 tablet, Rfl: 0    acetaminophen (TYLENOL) 500 mg tablet, Take 500 mg by mouth every 6 (six) hours as needed for mild pain (Patient not taking: Reported on 7/23/2024), Disp: , Rfl:     hydrOXYzine pamoate (VISTARIL) 25 mg capsule, Take 1 capsule (25 mg total) by mouth 3 (three) times a day as needed for anxiety (Patient not taking: Reported on 6/10/2024), Disp: 60 capsule, Rfl: 2    polyethylene glycol (GLYCOLAX) 17 GM/SCOOP powder, Take 17 g by mouth daily (Patient not taking: Reported on 6/10/2024), Disp: 255 g, Rfl: 0    traMADol (Ultram) 50 mg tablet, Take 1 tablet (50 mg total) by mouth every 8 (eight) hours as needed for moderate pain (Patient not taking: Reported on 7/23/2024), Disp: 9 tablet, Rfl: 0  Allergies   Allergen Reactions    Sulfa Antibiotics Anaphylaxis    Statins Hives       Review of Systems:  Review of Systems   Constitutional:  Positive for fatigue.   HENT: Negative.     Eyes: Negative.    Respiratory: Negative.     Cardiovascular:  Leg swelling: right leg at times.   Gastrointestinal:  Positive for diarrhea (last night).   Endocrine: Negative.    Genitourinary: Negative.    Musculoskeletal:  Positive for back pain (yesterday) and neck pain (yesterday).   Skin: Negative.    Allergic/Immunologic: Negative.     Neurological:  Positive for dizziness (occasional).   Hematological: Negative.    Psychiatric/Behavioral: Negative.       Physical Exam:  Physical Exam  Vitals and nursing note reviewed.   Constitutional:       General: She is not in acute distress.     Appearance: Normal appearance.   HENT:      Nose: No congestion.   Eyes:      General: No scleral icterus.     Extraocular Movements: Extraocular movements intact.      Pupils: Pupils are equal, round, and reactive to light.   Pulmonary:      Effort: Pulmonary effort is normal. No respiratory distress.   Chest:   Breasts:     Right: Normal.      Left: No swelling, mass, skin change or tenderness.          Comments: Left breast: Well-healing incision in the lower axilla and lower outer breast, both associated with residual surgical glue and mild seroma formation, no erythema, no palpable mass or lymphadenopathy  Musculoskeletal:         General: No swelling. Normal range of motion.      Cervical back: Normal range of motion.   Lymphadenopathy:      Cervical: No cervical adenopathy.      Upper Body:      Right upper body: No supraclavicular or axillary adenopathy.      Left upper body: No supraclavicular or axillary adenopathy.   Skin:     General: Skin is warm and dry.   Neurological:      General: No focal deficit present.      Mental Status: She is alert and oriented to person, place, and time.   Psychiatric:         Mood and Affect: Mood normal.         Thought Content: Thought content normal.         Judgment: Judgment normal.       LABS:    CBC  Diff   Lab Results   Component Value Date/Time    WBC 6.55 06/24/2024 03:14 PM    WBC 5.94 07/22/2015 04:25 PM    HGB 13.7 06/24/2024 03:14 PM    HGB 13.3 07/22/2015 04:25 PM    HCT 42.5 06/24/2024 03:14 PM    HCT 39.9 07/22/2015 04:25 PM    RBC 4.68 06/24/2024 03:14 PM    RBC 4.53 07/22/2015 04:25 PM    MCV 91 06/24/2024 03:14 PM    MCV 88 07/22/2015 04:25 PM    MCHC 32.2 06/24/2024 03:14 PM    MCHC 33.3 07/22/2015  04:25 PM    MCH 29.3 06/24/2024 03:14 PM    MCH 29.4 07/22/2015 04:25 PM    RDW 13.7 06/24/2024 03:14 PM    RDW 14.4 07/22/2015 04:25 PM    MPV 10.5 06/24/2024 03:14 PM    MPV 10.3 07/22/2015 04:25 PM    Lab Results   Component Value Date/Time    LYMPHSABS 2.90 03/13/2024 04:46 AM    LYMPHSABS 2.70 07/22/2015 04:25 PM    EOSABS 0.07 06/24/2024 03:14 PM    EOSABS 0.11 03/13/2024 04:46 AM    EOSABS 0.06 07/22/2015 04:25 PM    MONOSABS 0.53 03/13/2024 04:46 AM    MONOSABS 0.51 07/22/2015 04:25 PM    BASOSABS 0.07 06/24/2024 03:14 PM    BASOSABS 0.05 03/13/2024 04:46 AM    BASOSABS 0.02 07/22/2015 04:25 PM        Basic Metabolic Profile    Lab Results   Component Value Date/Time    SODIUM 140 06/24/2024 03:14 PM    SODIUM 141 02/09/2024 01:19 PM    CO2 26 06/24/2024 03:14 PM    CO2 23 02/09/2024 01:19 PM    ANIONGAP 11 07/22/2015 04:25 PM    Lab Results   Component Value Date/Time    BUN 10 06/24/2024 03:14 PM    BUN 12 02/09/2024 01:19 PM    CREATININE 0.79 06/24/2024 03:14 PM    CREATININE 0.91 12/30/2016 02:58 PM    GLUCOSE 132 (H) 12/30/2016 02:58 PM          Discussion/Summary: Ms. Morgan is a 64-year-old postmenopausal woman who self palpated a mass in the lower outer left breast, diagnostic imaging confirmed a suspicious mass and biopsy showed grade 2 invasive ductal carcinoma that is estrogen and progesterone receptor positive, HER2 negative.  MRI of the breast showed no additional findings bilaterally including no suspicious adenopathy.  She underwent a lumpectomy and sentinel node biopsy by Dr. Nails on July 9.  She did well postoperatively.  Final pathology shows a 1.1 cm grade 2 invasive ductal carcinoma with negative margins of excision, focal lymphovascular invasion and 1 of 3 sentinel nodes with a macrometastasis.  Pathologic stage pT1c pN1a cM0.  She will be seeing Dr. Carrero in medical oncology later this week.    I explained to the patient that radiation is used to eradicate microscopic deposits  disease which may in the nodes after surgery and any systemic therapies.  Radiation therefore reduces the risk of local regional recurrence and contributes to overall survival benefit.  Indications for radiation include the use of breast conservation surgery and the presence of positive axillary nodes.  I recommend treatment to the left whole breast and regional nodes.  I described the fractionation regimens typically utilized including conventional fractionation over 5 to 6 weeks versus hypofractionation over 3 to 4 weeks.  A boost dose is optional she does not have strong indications though the presence of lymphovascular invasion could be considered.  I described that procedure involving radiation to the left breast and regional nodes.  I explained the importance of cardiac avoidance using techniques such as deep inspiration breath-hold, cardiac blocking and other techniques.  I described the potential acute and long-term side effects of left breast and regional radha radiation.  Acutely she may experience localized skin irritation, swelling in the breast or fatigue.  Late effects can include the possibility of pneumonitis, lymphedema and formation of fibrosis in the breast that can lead to changes in texture or cosmesis.    Patient is seeing medical oncology later this week.  She may be a candidate for systemic chemotherapy given the presence of node positive disease.  She may also be a candidate for [employer] genomic testing to further assess the benefit of systemic chemotherapy in addition to endocrine therapy for her strongly hormone receptor positive, HER2 negative disease.  I explained that if systemic chemotherapy is recommended, this is typically given prior to radiation.  If no chemotherapy is indicated then she may proceed with radiation upon wound healing and start endocrine therapy shortly after completion of radiation treatment.  Informed consent was reviewed by me and signed by the patient today.   She was tentatively scheduled for CT simulation in 3 weeks.    I spent 60 minutes reviewing the medical record including multiple imaging studies and the reports, multiple laboratory and pathology reports, provider encounter notes and operative reports, discussing evidence-based treatment options with the patient and performing physical examination.

## 2024-07-23 NOTE — PROGRESS NOTES
Pascale Morgan 1960 is a 64 y.o. female who reported feeling a mass in the beginning of the year. She eventually had a diagnostic mammogram with biopsy done 5/23/24 that showed left breast invasive ductal carcinoma, ER/OR both greater than 95%, HER2 was 2+ on IHC but negative on FISH analysis. Genetic testing was negative. On 7/9/24 patient underwent left breast cheryl localized lumpectomy with SLN biopsy. She presents today for initial consult.       5/23/24 Mammo diagnostic bilateral w 3d & cad  US breast left limited (diagnostic)  FINDINGS:   LEFT  1) MASS [A]  Mammo diagnostic bilateral w 3d & cad: There is an irregularly shaped mass seen in the lower outer quadrant of the left breast in the middle depth. The mass correlates with the palpable mass reported by the patient.   US breast left limited (diagnostic): There is a 7 mm x 6 mm x 6 mm irregularly shaped, hypoechoic mass with microlobulated margins with no posterior features seen in the left breast at 4 o'clock, 4 cm from the nipple. The mass correlates with the palpable mass reported by the patient. The mass correlates with the prior mammogram finding. Ultrasound-guided core biopsy recommended.  Evaluation of the axilla confirms no pathologic lymphadenopathy.  Right  Mammo diagnostic bilateral w 3d & cad  There are no suspicious masses, grouped microcalcifications or areas of unexplained architectural distortion. The skin and nipple areolar complex are unremarkable.   IMPRESSION:  Ultrasound-guided core biopsy recommended for highly suspicious mass 4:00 left breast the area of palpable concern for the patient.      5/23/24 Left breast ultrasound-guided biopsy  4 o'clock, 4 cm from nipple (CHERYL)  Invasive carcinoma of no special type (ductal)  Grade 2  ER >95; OR >95; HER2 2+, FISH negative  Lymphovascular invasion cannot be determined; focus suspicious for     Concordant. Malignancy appears unifocal; suspicious masses cover up to 7 mm on US. Left axilla US  negative. Right breast clear.      24 MRI breast bilateral w and wo contrast w cad  FINDINGS:   LEFT  1) MASS [A]: The biopsy-proven malignancy in the lower outer left breast manifests as a round enhancing mass, which measures approximately 1.2 x 1.1 cm, however accurate measurement is limited due to susceptibility artifact from the Jyoti localizer.  There are no other abnormal areas of enhancement within the left breast.  Right  There are no suspicious enhancing masses or suspicious areas of non-mass enhancement. There is no axillary or internal mammary adenopathy.   IMPRESSION:   Biopsy-proven unifocal malignancy in the lower outer left breast, as described above.  No findings of malignancy in the right breast.      24 Surgery - Dr. Nails  Left breast jyoti directed lumpectomy/SLN biopsy  Invasive carcinoma of no special type (ductal)  Grade 2  1.1 cm  Lymphovascular invasion focally present  Margins negative  1/3 Lymph nodes with macrometastasis (6 mm, extranodal extension not identified)      [facility] Surgical Oncology - Dr. Nails  24 Hematology Oncology - Dr. Carrero    Oncology History   Malignant neoplasm of lower-outer quadrant of left breast of female, estrogen receptor positive (HCC)   2024 Biopsy    Left breast ultrasound-guided biopsy  4 o'clock, 4 cm from nipple (JYOTI)  Invasive carcinoma of no special type (ductal)  Grade 2  ER >95; NY >95; HER2 2+, FISH negative  Lymphovascular invasion cannot be determined; focus suspicious for    Concordant. Malignancy appears unifocal; suspicious masses cover up to 7 mm on US. Left axilla US negative. Right breast clear.     2024 -  Cancer Staged    Staging form: Breast, AJCC 8th Edition  - Clinical stage from 2024: Stage IA (cT1b, cN0, cM0, G2, ER+, NY+, HER2-) - Signed by Alyssia Nails MD on 6/10/2024  Stage prefix: Initial diagnosis  Method of lymph node assessment: Clinical  Histologic grading system: 3 grade system        6/2/2024 Genetic Testing    NEGATIVE: No Clinically Significant Variants Detected  A total of 59 genes were evaluated with RNA insight: APC, ED, BAP1, BARD1, BMPR1A, BRCA1, BRCA2, BRIP1, CDH1, CDK4, CDKN1B, CDKN2A, CHEK2, DICER1, FH, FLCN, KIF1B, MAX, MEN1, MET, MLH1, MSH2, MSH6, MUTYH, NF1, NTHL1, PALB2, PMS2, POT1, PTEN, RAD51C, RAD51D, RB1, RET, SDHA, SDHAF2, SDHB, SDHC, SDHD, SMAD4, SMARCA4, STK11, AQGU278, TP53, TSC1, TSC2 and VHL (sequencing and deletion/duplication); AXIN2, CTNNA1, EGLN1, HOXB13, KIT, MITF, MSH3, PDGFRA, POLD1 and POLE (sequencing only); EPCAM and GREM1 (deletion/duplication only).  Ambry     7/2/2024 Observation    Bilateral breast MRI done per patient's request.   IMPRESSION:   Biopsy-proven unifocal malignancy in the lower outer left breast; measures up to 1.2 cm.   No findings of malignancy in the right breast.     7/9/2024 Surgery    Left breast IDRIS localized lumpectomy with SLN biopsy  Invasive carcinoma of no special type (ductal)  Grade 2  1.1 cm  Lymphovascular invasion focally present  Margins negative  1/3 Lymph nodes with macrometastasis (6 mm, extranodal extension not identified)     7/9/2024 -  Cancer Staged    Staging form: Breast, AJCC 8th Edition  - Pathologic stage from 7/9/2024: Stage IA (pT1c, pN1a, cM0, G2, ER+, NJ+, HER2-) - Signed by Rosalie Soliman MD on 7/23/2024  Histopathologic type: Infiltrating duct carcinoma, NOS  Stage prefix: Initial diagnosis  Nuclear grade: G2  Multigene prognostic tests performed: None  Histologic grading system: 3 grade system  Laterality: Left  Lymph-vascular invasion (LVI): LVI present/identified, NOS           Review of Systems:  Review of Systems   Constitutional:  Positive for fatigue.   HENT: Negative.     Eyes: Negative.    Respiratory: Negative.     Cardiovascular:  Leg swelling: right leg at times.   Gastrointestinal:  Positive for diarrhea (last night).   Endocrine: Negative.    Genitourinary: Negative.    Musculoskeletal:   Positive for back pain (yesterday) and neck pain (yesterday).   Skin: Negative.    Allergic/Immunologic: Negative.    Neurological:  Positive for dizziness (occasional).   Hematological: Negative.    Psychiatric/Behavioral: Negative.         Clinical Trial: no    OB/GYN History:  The patient underwent menarche at 13 years  Menopause Status Post  No LMP recorded. Patient is postmenopausal.  Menopause at 51 years.  Menopause Reason natural  Hormone replacement therapy: no.    0   Para 0   Nursing: not applicable.   Birth control pills: yes.  Years used 30    Pregnancy test needed:  no    Prior Radiation no    Teaching NCI RT packet given    Implantable Devices (Port, Pacemaker, pain stimulator) no    Hip Replacement no      Health Maintenance   Topic Date Due    Hepatitis C Screening  Never done    Pneumococcal Vaccine: Pediatrics (0 to 5 Years) and At-Risk Patients (6 to 64 Years) (1 of 2 - PCV) Never done    HIV Screening  Never done    Zoster Vaccine (1 of 2) Never done    DTaP,Tdap,and Td Vaccines (1 - Tdap) Never done    RSV Vaccine Age 60+ Years (1 - 1-dose 60+ series) Never done    COVID-19 Vaccine (2 - Joi risk series) 2021    BMI: Followup Plan  10/19/2023    DM Eye Exam  2024    HEMOGLOBIN A1C  2024    Influenza Vaccine (1) 2024    Annual Physical  2025    Kidney Health Evaluation: Albumin/Creatinine Ratio  2025    Diabetic Foot Exam  2025    Breast Cancer Screening: Mammogram  2025    Kidney Health Evaluation: GFR  2025    BMI: Adult  2025    Depression Screening  2025    Cervical Cancer Screening  2027    Colorectal Cancer Screening  2034    RSV Vaccine age 0-20 Months  Aged Out    HIB Vaccine  Aged Out    IPV Vaccine  Aged Out    Hepatitis A Vaccine  Aged Out    Meningococcal ACWY Vaccine  Aged Out    HPV Vaccine  Aged Out     Past Medical History:   Diagnosis Date    Anxiety     Cancer (HCC)     Breast cancer     Colon polyp     COVID-19     in march    CPAP (continuous positive airway pressure) dependence     Disease of thyroid gland     Fatty liver ?    GERD (gastroesophageal reflux disease) ?    Hyperlipidemia     Irritable bowel syndrome     Kidney stone     Sleep apnea      Past Surgical History:   Procedure Laterality Date    BREAST BIOPSY Right     benign    COLONOSCOPY  2016?    FRACTURE SURGERY Right     arm    NH BX/EXC LYMPH NODE OPEN SUPERFICIAL Left 7/9/2024    Procedure: BX LYMPH NODE SENTINEL;;  Surgeon: Alyssia Nails MD;  Location: [facility];  Service: Surgical Oncology    NH EXC BREAST LES PREOP PLMT RAD MARKER OPEN 1 LES Left 7/9/2024    Procedure: LEFT BREAST IDRIS LOCALIZED LUMPECTOMY;  Surgeon: Alyssia Nails MD;  Location: [facility];  Service: Surgical Oncology    NH INJ RADIOACTIVE TRACER FOR ID OF SENTINEL NODE Left 7/9/2024    Procedure: BX LYMPH NODE SENTINEL;;  Surgeon: Alyssia Nails MD;  Location: [facility];  Service: Surgical Oncology    NH INTRAOP SENTINEL LYMPH NODE ID W/DYE INJECTION Left 7/9/2024    Procedure: BX LYMPH NODE SENTINEL;;  Surgeon: Alyssia Nails MD;  Location: [facility];  Service: Surgical Oncology    NH MASTECTOMY PARTIAL Left 7/9/2024    Procedure: LEFT BREAST IDRIS LOCALIZED LUMPECTOMY;  Surgeon: Alyssia Nails MD;  Location: [facility];  Service: Surgical Oncology    UPPER GASTROINTESTINAL ENDOSCOPY  2021    US GUIDED BREAST BIOPSY LEFT COMPLETE Left 5/23/2024    WISDOM TOOTH EXTRACTION       Family History   Problem Relation Age of Onset    Dementia Mother     Heart failure Mother     Diabetes Mother         My Mother passed from congestive heart failure    Hypothyroidism Mother     Pancreatic cancer Father     Uterine cancer Sister 62    Dementia Sister     Hypothyroidism Sister     Hypothyroidism Sister     No Known Problems Sister     No Known Problems Maternal Grandmother     No Known Problems Maternal Grandfather     No Known Problems Paternal Grandmother     No Known  "Problems Paternal Grandfather     Throat cancer Maternal Uncle      Social History     Tobacco Use    Smoking status: Never    Smokeless tobacco: Never   Vaping Use    Vaping status: Never Used   Substance Use Topics    Alcohol use: Yes     Alcohol/week: 6.0 - 8.0 standard drinks of alcohol     Types: 2 - 4 Glasses of wine, 4 Standard drinks or equivalent per week     Comment: socially    Drug use: No        Current Outpatient Medications:     levothyroxine 100 mcg tablet, Take 1 tablet (100 mcg total) by mouth daily, Disp: 100 tablet, Rfl: 1    levothyroxine 100 mcg tablet, Take 1 tablet (100 mcg total) by mouth daily in the early morning, Disp: 7 tablet, Rfl: 0    pantoprazole (PROTONIX) 40 mg tablet, Take 1 tablet (40 mg total) by mouth daily, Disp: 90 tablet, Rfl: 0    acetaminophen (TYLENOL) 500 mg tablet, Take 500 mg by mouth every 6 (six) hours as needed for mild pain (Patient not taking: Reported on 7/23/2024), Disp: , Rfl:     hydrOXYzine pamoate (VISTARIL) 25 mg capsule, Take 1 capsule (25 mg total) by mouth 3 (three) times a day as needed for anxiety (Patient not taking: Reported on 6/10/2024), Disp: 60 capsule, Rfl: 2    polyethylene glycol (GLYCOLAX) 17 GM/SCOOP powder, Take 17 g by mouth daily (Patient not taking: Reported on 6/10/2024), Disp: 255 g, Rfl: 0    traMADol (Ultram) 50 mg tablet, Take 1 tablet (50 mg total) by mouth every 8 (eight) hours as needed for moderate pain (Patient not taking: Reported on 7/23/2024), Disp: 9 tablet, Rfl: 0  Allergies   Allergen Reactions    Sulfa Antibiotics Anaphylaxis    Statins Hives      Vitals:    07/23/24 1322   BP: 123/80   Pulse: 75   Resp: 16   Temp: (!) 97.2 °F (36.2 °C)   SpO2: 98%   Weight: 97.5 kg (215 lb)   Height: 5' 4\" (1.626 m)     Pain Score:   3  "

## 2024-07-24 ENCOUNTER — OFFICE VISIT (OUTPATIENT)
Dept: SURGICAL ONCOLOGY | Facility: CLINIC | Age: 64
End: 2024-07-24

## 2024-07-24 VITALS
BODY MASS INDEX: 36.88 KG/M2 | HEIGHT: 64 IN | OXYGEN SATURATION: 97 % | HEART RATE: 76 BPM | SYSTOLIC BLOOD PRESSURE: 122 MMHG | TEMPERATURE: 97.5 F | DIASTOLIC BLOOD PRESSURE: 78 MMHG | WEIGHT: 216 LBS

## 2024-07-24 DIAGNOSIS — Z13.71 BRCA GENE MUTATION NEGATIVE: ICD-10-CM

## 2024-07-24 DIAGNOSIS — C50.512 MALIGNANT NEOPLASM OF LOWER-OUTER QUADRANT OF LEFT BREAST OF FEMALE, ESTROGEN RECEPTOR POSITIVE (HCC): Primary | ICD-10-CM

## 2024-07-24 DIAGNOSIS — Z98.890 STATUS POST LEFT BREAST LUMPECTOMY: ICD-10-CM

## 2024-07-24 DIAGNOSIS — Z17.0 MALIGNANT NEOPLASM OF LOWER-OUTER QUADRANT OF LEFT BREAST OF FEMALE, ESTROGEN RECEPTOR POSITIVE (HCC): Primary | ICD-10-CM

## 2024-07-24 PROCEDURE — 99024 POSTOP FOLLOW-UP VISIT: CPT | Performed by: SURGERY

## 2024-07-24 NOTE — PROGRESS NOTES
64 y.o. female is here today s/p left lumpectomy and sentinel node biopsy. She reports mild discomfort in the axilla.      Physical Exam  Constitutional:       General: She is not in acute distress.     Appearance: Normal appearance.   Chest:   Breasts:     Left: Swelling (Mild, soft in the axilla) and skin change (Well-healing incision in the breast and axilla with no signs of infection) present.   Neurological:      Mental Status: She is alert and oriented to person, place, and time.   Psychiatric:         Mood and Affect: Mood normal.         Data:       Stagin mm invasive ductal  Tumor grade 2  LVI present  Margins clean  Estrogen receptor and progesterone receptor status positive  HER2 status and test method negative  Lymph node assessment/status 1 out of 3 lymph nodes +6 mm focus, no extranodal extension      Stage: IA        Diagnoses and all orders for this visit:    Malignant neoplasm of lower-outer quadrant of left breast of female, estrogen receptor positive (HCC)    Status post left breast lumpectomy    BRCA gene mutation negative        Assessment/Plan: 66-year-old female status post left breast conservation for a T1c N1a invasive ductal carcinoma.  She is healing well with no signs of infection.  I advised her to use a warm compress for the mild swelling in the axilla.  She should apply Aquaphor to her incisions.  She already met with a radiation oncology and is scheduled to see medical oncology in 2 days.  We will plan to see her again in 6 months for a survivorship visit or sooner should the need arise.

## 2024-07-25 ENCOUNTER — APPOINTMENT (OUTPATIENT)
Facility: HOSPITAL | Age: 64
End: 2024-07-25
Payer: COMMERCIAL

## 2024-07-26 ENCOUNTER — TELEPHONE (OUTPATIENT)
Dept: HEMATOLOGY ONCOLOGY | Facility: CLINIC | Age: 64
End: 2024-07-26

## 2024-07-26 ENCOUNTER — CONSULT (OUTPATIENT)
Dept: HEMATOLOGY ONCOLOGY | Facility: CLINIC | Age: 64
End: 2024-07-26
Payer: COMMERCIAL

## 2024-07-26 VITALS
HEIGHT: 64 IN | RESPIRATION RATE: 18 BRPM | WEIGHT: 215 LBS | HEART RATE: 114 BPM | TEMPERATURE: 98 F | DIASTOLIC BLOOD PRESSURE: 90 MMHG | BODY MASS INDEX: 36.7 KG/M2 | SYSTOLIC BLOOD PRESSURE: 130 MMHG | OXYGEN SATURATION: 98 %

## 2024-07-26 DIAGNOSIS — Z78.0 ASYMPTOMATIC MENOPAUSE: Primary | ICD-10-CM

## 2024-07-26 DIAGNOSIS — C50.512 MALIGNANT NEOPLASM OF LOWER-OUTER QUADRANT OF LEFT BREAST OF FEMALE, ESTROGEN RECEPTOR POSITIVE (HCC): ICD-10-CM

## 2024-07-26 DIAGNOSIS — Z17.0 MALIGNANT NEOPLASM OF LOWER-OUTER QUADRANT OF LEFT BREAST OF FEMALE, ESTROGEN RECEPTOR POSITIVE (HCC): ICD-10-CM

## 2024-07-26 PROCEDURE — 99204 OFFICE O/P NEW MOD 45 MIN: CPT | Performed by: INTERNAL MEDICINE

## 2024-07-28 NOTE — PROGRESS NOTES
Oncology Outpatient Consult Note  Pascale Morgan 64 y.o. female MRN: @ Encounter: [id number]        Date:  7/28/2024        CC:  new diagnosis, left sided breast cancer      HPI:  Pascale Morgan is seen for initial consultation 7/28/2024 regarding her newly diagnosed left-sided invasive ductal carcinoma.  She underwent a left-sided mastectomy with sentinel lymph node biopsy for final stage of pT1c N1a.  She was highly ER/ME positive and HER2 negative.  Her other medical problems include sleep apnea, hypothyroidism, GERD, irritable bowel, fatty liver.  She is here today with her .  She has never had a DEXA scan.  Her genetic testing was negative.  She does have chronic complaints of low back pain and abdominal bloating.  She is healing well after surgery.  She denies any chest pain or shortness of breath.  She works as a .  She denies tobacco she drinks alcohol about 4 times a week socially.  No dental problems.        ROS: As stated in history of present illness otherwise her 14 point review of systems today was negative.    ECOG PS:0    Cancer Staging:  Cancer Staging   Malignant neoplasm of lower-outer quadrant of left breast of female, estrogen receptor positive (HCC)  Staging form: Breast, AJCC 8th Edition  - Clinical stage from 5/23/2024: Stage IA (cT1b, cN0, cM0, G2, ER+, ME+, HER2-) - Signed by Alyssia Nails MD on 6/10/2024  Stage prefix: Initial diagnosis  Method of lymph node assessment: Clinical  Histologic grading system: 3 grade system  - Pathologic stage from 7/9/2024: Stage IA (pT1c, pN1a, cM0, G2, ER+, ME+, HER2-) - Signed by Rosalie Soliman MD on 7/23/2024  Histopathologic type: Infiltrating duct carcinoma, NOS  Stage prefix: Initial diagnosis  Nuclear grade: G2  Multigene prognostic tests performed: None  Histologic grading system: 3 grade system  Laterality: Left  Lymph-vascular invasion (LVI): LVI present/identified, NOS      Molecular Testing:     Oncology History Overview Note    7/9/2024 - left sided partial mastectomy with SLNBX for an invasive ductal carcinoma, grade 2, ER > 95% ME > 95%, Her2 2+ with negative FISH - pT1c(11mm)N1a(1/3)M0     Malignant neoplasm of lower-outer quadrant of left breast of female, estrogen receptor positive (HCC)   5/23/2024 Biopsy    Left breast ultrasound-guided biopsy  4 o'clock, 4 cm from nipple (IDRIS)  Invasive carcinoma of no special type (ductal)  Grade 2  ER >95; ME >95; HER2 2+, FISH negative  Lymphovascular invasion cannot be determined; focus suspicious for    Concordant. Malignancy appears unifocal; suspicious masses cover up to 7 mm on US. Left axilla US negative. Right breast clear.     5/23/2024 -  Cancer Staged    Staging form: Breast, AJCC 8th Edition  - Clinical stage from 5/23/2024: Stage IA (cT1b, cN0, cM0, G2, ER+, ME+, HER2-) - Signed by Alyssia Nails MD on 6/10/2024  Stage prefix: Initial diagnosis  Method of lymph node assessment: Clinical  Histologic grading system: 3 grade system       6/2/2024 Genetic Testing    NEGATIVE: No Clinically Significant Variants Detected  A total of 59 genes were evaluated with RNA insight: APC, ED, BAP1, BARD1, BMPR1A, BRCA1, BRCA2, BRIP1, CDH1, CDK4, CDKN1B, CDKN2A, CHEK2, DICER1, FH, FLCN, KIF1B, MAX, MEN1, MET, MLH1, MSH2, MSH6, MUTYH, NF1, NTHL1, PALB2, PMS2, POT1, PTEN, RAD51C, RAD51D, RB1, RET, SDHA, SDHAF2, SDHB, SDHC, SDHD, SMAD4, SMARCA4, STK11, XFIF815, TP53, TSC1, TSC2 and VHL (sequencing and deletion/duplication); AXIN2, CTNNA1, EGLN1, HOXB13, KIT, MITF, MSH3, PDGFRA, POLD1 and POLE (sequencing only); EPCAM and GREM1 (deletion/duplication only).  [place]     7/2/2024 Observation    Bilateral breast MRI done per patient's request.   IMPRESSION:   Biopsy-proven unifocal malignancy in the lower outer left breast; measures up to 1.2 cm.   No findings of malignancy in the right breast.     7/9/2024 Surgery    Left breast IDRIS localized lumpectomy with SLN biopsy  Invasive carcinoma of no special  type (ductal)  Grade 2  1.1 cm  Lymphovascular invasion focally present  Margins negative  1/3 Lymph nodes with macrometastasis (6 mm, extranodal extension not identified)     7/9/2024 -  Cancer Staged    Staging form: Breast, AJCC 8th Edition  - Pathologic stage from 7/9/2024: Stage IA (pT1c, pN1a, cM0, G2, ER+, CT+, HER2-) - Signed by Rosalie Soliman MD on 7/23/2024  Histopathologic type: Infiltrating duct carcinoma, NOS  Stage prefix: Initial diagnosis  Nuclear grade: G2  Multigene prognostic tests performed: None  Histologic grading system: 3 grade system  Laterality: Left  Lymph-vascular invasion (LVI): LVI present/identified, NOS               Test Results:    Imaging: Mammo clip breast specimen (No Charge)    Result Date: 7/10/2024  Narrative: Imaging of the left breast specimen confirm successful excision of the Jyoti  reflector.     NM lymphatic breast    Result Date: 7/9/2024  Narrative: SENTINEL NODE LYMPHOSCINTIGRAPHY INDICATION: Left breast carcinoma FINDINGS: 0.483 mCi Tc-99m sulfur colloid (0.6 cc volume) was administered in divided doses in the left region. Scintigraphic images were obtained of the left in multiple projections. Left axillary lymph node identified. Using scintigraphic guidance, the corresponding skin site was marked with an indelible marker.     Impression: [place] lymph node localized to left axilla. Workstation performed: [id number]     MRI breast bilateral w and wo contrast w cad    Result Date: 7/3/2024  Narrative: DIAGNOSIS: Malignant neoplasm of lower-outer quadrant of left breast of female, estrogen receptor positive (HCC) TECHNIQUE: MRI of both breasts was performed in axial planes utilizing a combination of localizer, axial T2 STIR, Axial T1 FSPGR in phase, axial dynamic 3D fat suppressed gradient-echo T1 sequences (VIBRANT) before and after contrast administration at multiple time points, and sagittal 3D fat suppressed gradient-echo T1 sequences (VIBRANT)  post-contrast. This exam was read in conjunction with [employer] pte software. MEDICATIONS ADMINISTERED:  Intravenous contrast was administered at 2cc/sec, without documented contrast reaction. COMPARISONS: Prior breast imaging dated: 05/23/2024, 05/23/2024, 05/23/2024, and 05/23/2024 RELEVANT HISTORY: Family Breast Cancer History: No known family history of breast cancer. Family Medical History: No known relevant family medical history. Personal History: Hormone history includes birth control. Surgical history includes breast biopsy. No known relevant medical history. RISK ASSESSMENT: 5 Year Tyrer-Cuzick: 1.91% 10 Year Tyrer-Cuzick: 3.58% Lifetime Tyrer-Cuzick: 8.05%  TISSUE DENSITY: FGT: The breasts have scattered fibroglandular tissue. BPE: The background parenchymal enhancement is mild and symmetric. INDICATION: Pascale Morgan is a 64 y.o. female presenting for breast cancer.  FINDINGS: LEFT 1) MASS [A]: The biopsy-proven malignancy in the lower outer left breast manifests as a round enhancing mass, which measures approximately 1.2 x 1.1 cm, however accurate measurement is limited due to susceptibility artifact from the Jyoti localizer.  There are no other abnormal areas of enhancement within the left breast. Right There are no suspicious enhancing masses or suspicious areas of non-mass enhancement. There is no axillary or internal mammary adenopathy.     Impression:  Biopsy-proven unifocal malignancy in the lower outer left breast, as described above. No findings of malignancy in the right breast. ASSESSMENT/BI-RADS CATEGORY: Left: 6 - Known Biopsy-Proven Malignancy Right: 1 - Negative Overall: 6 - Known Biopsy-Proven Malignancy RECOMMENDATION:      - Surgical consultation for the left breast.      - Continue annual screening mammography for the right breast. Workstation ID: [id number]      Labs:   Lab Results   Component Value Date    WBC 6.55 06/24/2024    HGB 13.7 06/24/2024    HCT 42.5 06/24/2024    MCV 91  "06/24/2024     06/24/2024     Lab Results   Component Value Date     12/30/2016    K 4.1 06/24/2024     06/24/2024    CO2 26 06/24/2024    ANIONGAP 11 07/22/2015    BUN 10 06/24/2024    CREATININE 0.79 06/24/2024    GLUCOSE 132 (H) 12/30/2016    CALCIUM 9.7 06/24/2024    AST 27 06/24/2024    ALT 27 06/24/2024    ALKPHOS 87 06/24/2024    PROT 7.6 12/30/2016    BILITOT 0.8 12/30/2016    EGFR 80 06/24/2024         No results found for: \"SPEP\", \"UPEP\"    No results found for: \"PSA\"    No results found for: \"CEA\"    No results found for: \"AFP\"    No results found for: \"IRON\", \"TIBC\", \"FERRITIN\"    No results found for: \"NXXDWVBI90\"            Active Problems:   Patient Active Problem List   Diagnosis    Abnormal EKG    Abnormal vaginal bleeding    Acquired hypothyroidism    Anxiety    Chest pain, non-cardiac    Hypercholesteremia    Impaired fasting glucose    Palpitations    Pre-diabetes    COVID-19 virus infection    New onset type 2 diabetes mellitus (HCC)    Obstructive sleep apnea syndrome    Sleep related hypoxia    Gas bloat syndrome    Gastroesophageal reflux disease without esophagitis    Rectal bleeding    Irritable bowel syndrome    Malignant neoplasm of lower-outer quadrant of left breast of female, estrogen receptor positive (HCC)       Past Medical History:   Past Medical History:   Diagnosis Date    Anxiety     Colon polyp     COVID-19     in march    CPAP (continuous positive airway pressure) dependence     Disease of thyroid gland     Fatty liver ?    GERD (gastroesophageal reflux disease) ?    Hyperlipidemia     Irritable bowel syndrome     Kidney stone     Sleep apnea        Surgical History:   Past Surgical History:   Procedure Laterality Date    BREAST BIOPSY Right     benign    COLONOSCOPY  2016?    FRACTURE SURGERY Right     arm    MA BX/EXC LYMPH NODE OPEN SUPERFICIAL Left 7/9/2024    Procedure: BX LYMPH NODE SENTINEL;;  Surgeon: Alyssia Nails MD;  [facility];  " "Service: Surgical Oncology    PA EXC BREAST LES PREOP PLMT RAD MARKER OPEN 1 LES Left 7/9/2024    Procedure: LEFT BREAST IDRIS LOCALIZED LUMPECTOMY;  Surgeon: Alyssia Nails MD;  [facility];  Service: Surgical Oncology    PA INJ RADIOACTIVE TRACER FOR ID OF SENTINEL NODE Left 7/9/2024    Procedure: BX LYMPH NODE SENTINEL;;  Surgeon: Alyssia Nails MD;  [facility];  Service: Surgical Oncology    PA INTRAOP SENTINEL LYMPH NODE ID W/DYE INJECTION Left 7/9/2024    Procedure: BX LYMPH NODE SENTINEL;;  Surgeon: Alyssia Nails MD;  [facility];  Service: Surgical Oncology    PA MASTECTOMY PARTIAL Left 7/9/2024    Procedure: LEFT BREAST IDRIS LOCALIZED LUMPECTOMY;  Surgeon: Alyssia Nails MD;  [facility];  Service: Surgical Oncology    UPPER GASTROINTESTINAL ENDOSCOPY  2021    US GUIDED BREAST BIOPSY LEFT COMPLETE Left 5/23/2024    WISDOM TOOTH EXTRACTION         Family History:    Family History   Problem Relation Age of Onset    Dementia Mother     Heart failure Mother     Diabetes Mother         My Mother passed from congestive heart failure    Hypothyroidism Mother     Pancreatic cancer Father     Uterine cancer Sister 62    Dementia Sister     Hypothyroidism Sister     Hypothyroidism Sister     No Known Problems Sister     No Known Problems Maternal Grandmother     No Known Problems Maternal Grandfather     No Known Problems Paternal Grandmother     No Known Problems Paternal Grandfather     Throat cancer Maternal Uncle        Cancer-related family history includes Pancreatic cancer in her father; Uterine cancer (age of onset: 62) in her sister.    Social History:   Social History     Socioeconomic History    Marital status: /Civil Union     Spouse name: Gulshan \"Paras\"    Number of children: 0    Years of education: Not on file    Highest education level: Not on file   Occupational History    Occupation:    Tobacco Use    Smoking status: Never    Smokeless tobacco: " Never   Vaping Use    Vaping status: Never Used   Substance and Sexual Activity    Alcohol use: Yes     Alcohol/week: 6.0 - 8.0 standard drinks of alcohol     Types: 2 - 4 Glasses of wine, 4 Standard drinks or equivalent per week     Comment: socially    Drug use: No    Sexual activity: Not Currently     Partners: Male     Birth control/protection: None   Other Topics Concern    Not on file   Social History Narrative    Not on file     Social Determinants of Health     Financial Resource Strain: Not on file   Food Insecurity: No Food Insecurity (3/13/2024)    Hunger Vital Sign     Worried About Running Out of Food in the Last Year: Never true     Ran Out of Food in the Last Year: Never true   Transportation Needs: No Transportation Needs (3/13/2024)    PRAPARE - Transportation     Lack of Transportation (Medical): No     Lack of Transportation (Non-Medical): No   Physical Activity: Not on file   Stress: Not on file   Social Connections: Not on file   Intimate Partner Violence: Not on file   Housing Stability: Low Risk  (3/13/2024)    Housing Stability Vital Sign     Unable to Pay for Housing in the Last Year: No     Number of Times Moved in the Last Year: 1     Homeless in the Last Year: No       Current Medications:   Current Outpatient Medications   Medication Sig Dispense Refill    levothyroxine 100 mcg tablet Take 1 tablet (100 mcg total) by mouth daily 100 tablet 1    pantoprazole (PROTONIX) 40 mg tablet Take 1 tablet (40 mg total) by mouth daily (Patient taking differently: Take 40 mg by mouth as needed) 90 tablet 0    polyethylene glycol (GLYCOLAX) 17 GM/SCOOP powder Take 17 g by mouth daily (Patient not taking: Reported on 6/10/2024) 255 g 0     No current facility-administered medications for this visit.       Allergies:   Allergies   Allergen Reactions    Sulfa Antibiotics Anaphylaxis    Statins Hives         Physical Exam:    Body surface area is 2.02 meters squared.    Wt Readings from Last 3 Encounters:    07/26/24 97.5 kg (215 lb)   07/24/24 98 kg (216 lb)   07/23/24 97.5 kg (215 lb)        Temp Readings from Last 3 Encounters:   07/26/24 98 °F (36.7 °C)   07/24/24 97.5 °F (36.4 °C) (Temporal)   07/23/24 (!) 97.2 °F (36.2 °C)        BP Readings from Last 3 Encounters:   07/26/24 130/90   07/24/24 122/78   07/23/24 123/80         Pulse Readings from Last 3 Encounters:   07/26/24 (!) 114   07/24/24 76   07/23/24 75     @LASTSAO2(3)@    Physical Exam     Constitutional   General appearance: No acute distress, well appearing and well nourished.    Eyes   Conjunctiva and lids: No swelling, erythema or discharge.    Pupils and irises: Equal, round and reactive to light.    Ears, Nose, Mouth, and Throat   External inspection of ears and nose: Normal.    Nasal mucosa, septum, and turbinates: Normal without edema or erythema.    Oropharynx: Normal with no erythema, edema, exudate or lesions.    Pulmonary   Respiratory effort: No increased work of breathing or signs of respiratory distress.    Auscultation of lungs: Clear to auscultation.    Cardiovascular   Palpation of heart: Normal PMI, no thrills.    Auscultation of heart: Normal rate and rhythm, normal S1 and S2, without murmurs.    Examination of extremities for edema and/or varicosities: Normal.    Carotid pulses: Normal.    Abdomen   Abdomen: Non-tender, no masses.    Liver and spleen: No hepatomegaly or splenomegaly.    Lymphatic   Palpation of lymph nodes in neck: No lymphadenopathy.    Musculoskeletal   Gait and station: Normal.    Digits and nails: Normal without clubbing or cyanosis.    Inspection/palpation of joints, bones, and muscles: Normal.    Skin   Skin and subcutaneous tissue: Normal without rashes or lesions.    Neurologic   Cranial nerves: Cranial nerves 2-12 intact.    Sensation: No sensory loss.    Psychiatric   Orientation to person, place, and time: Normal.    Mood and affect: Normal.          Assessment/ Plan: 64-year-old male with a pT1 cN1a M0  ER/LA positive HER2 negative breast cancer.  We discussed sending a MammaPrint to aid with decision making for benefit of adjuvant chemotherapy.  The patient was in agreement with sending this test and we will meet in about 2 weeks to review the result.  We also discussed that there would be benefit to 5 to 10 years of adjuvant endocrine therapy with an aromatase inhibitor.  I am going to stage her with a PET scan because of her back pain and abdominal bloating.  She will also need a baseline DEXA scan prior to starting the aromatase inhibitor.  All the patient's questions were answered and we will meet again in a few weeks to review the results of these tests.  She knows to call in the interim with any questions or concerns.        I spent 45 minutes in chart review, face to face counseling, coordination of care, and documentation.

## 2024-07-29 ENCOUNTER — PATIENT OUTREACH (OUTPATIENT)
Dept: HEMATOLOGY ONCOLOGY | Facility: CLINIC | Age: 64
End: 2024-07-29

## 2024-07-29 NOTE — PROGRESS NOTES
Patient Navigation attempted to outreach patient to assess any questions or concerns regarding Medical Oncology consult. A voicemail was left stating a reason for my call and my contact information was provided . I requested a return call at patient's earliest convenience.

## 2024-07-30 ENCOUNTER — APPOINTMENT (OUTPATIENT)
Facility: HOSPITAL | Age: 64
End: 2024-07-30
Payer: COMMERCIAL

## 2024-08-01 ENCOUNTER — HOSPITAL ENCOUNTER (OUTPATIENT)
Dept: BONE DENSITY | Facility: IMAGING CENTER | Age: 64
Discharge: HOME | End: 2024-08-01
Payer: COMMERCIAL

## 2024-08-01 VITALS — BODY MASS INDEX: 36.7 KG/M2 | HEIGHT: 64 IN | WEIGHT: 215 LBS

## 2024-08-01 DIAGNOSIS — Z78.0 ASYMPTOMATIC MENOPAUSE: ICD-10-CM

## 2024-08-01 PROCEDURE — 77080 DXA BONE DENSITY AXIAL: CPT

## 2024-08-02 ENCOUNTER — TELEPHONE (OUTPATIENT)
Dept: HEMATOLOGY ONCOLOGY | Facility: CLINIC | Age: 64
End: 2024-08-02

## 2024-08-02 ENCOUNTER — TELEPHONE (OUTPATIENT)
Age: 64
End: 2024-08-02

## 2024-08-02 ENCOUNTER — PATIENT OUTREACH (OUTPATIENT)
Dept: HEMATOLOGY ONCOLOGY | Facility: CLINIC | Age: 64
End: 2024-08-02

## 2024-08-02 NOTE — PROGRESS NOTES
Breast Oncology Nurse Navigator    Patient called in about her DXA Bone Density test results she received via [employer]. She would like to review the results and what it all means in regards to her care. She also inquired about MammoPrint and her upcoming PET scan. She would like more information as to what the MammoPrint is used for and if there are alternatives to having a PET scan as she is concerned about radiation. She found alternative tests online and is wondering if those are options.  She asked about her upcoming [facility]  appointment for radiation therapy, she  asked if she would  be face up or down,I explained it will be determined at her SIM appointment.    Patient has my contact information and knows to reach out with further questions or concerns.

## 2024-08-02 NOTE — TELEPHONE ENCOUNTER
Pt returned call from LELIA Hernandez to discuss her Dexa scan results. She is available now and will be expecting your call.

## 2024-08-02 NOTE — TELEPHONE ENCOUNTER
"Left VM for patient to have her callback to review her concerns based off of LELIA Salazar note \"Patient called in with questions about her recent imaging results. She had her DXA Bone Density test yesterday and would like to review them, she received them via [employer]. She also has questions in regards to Mammoprint and PET scan, She is asking if there are alternatives to having the PET as she is concerned about the radiation. She was looking online and has questions about alternative tests for the PET. She would like to discuss them further detail. \"  Left hopeline number for her to callback.   "

## 2024-08-05 ENCOUNTER — PATIENT OUTREACH (OUTPATIENT)
Dept: HEMATOLOGY ONCOLOGY | Facility: CLINIC | Age: 64
End: 2024-08-05

## 2024-08-05 NOTE — PROGRESS NOTES
Patient Navigation attempted to outreach patient for the second time to assess any questions or concerns regarding Medical Oncology consult. A voicemail was left stating a reason for my call and my contact information was provided . I requested a return call at patient's earliest convenience.

## 2024-08-05 NOTE — TELEPHONE ENCOUNTER
Spoke with patient and informed her that a PET CT would be best. Patient verbalized understanding. I also informed her that I will try to get this sooner and will call her with any sooner appts for the PET CT. Patient verbalized understanding and is in agreement with the plan.

## 2024-08-05 NOTE — TELEPHONE ENCOUNTER
Spoke with patient regarding her DEXA results. I informed her that she does have osteopenia, which is decreased bone density. I informed her that Dr. Carrero will speak further of these results in her office visit. Patient also states that she was looking into the PET CT and she is concerned with the radiation and would like Dr. Carrero's opinion on infrared thermography in it's place. Patient states that she is still willing to get the PET CT if Dr. Carrero prefers. I informed her that I will speak with Dr. Carrero and call her back with a response. Patient verbalized understanding and is in agreement with the plan.

## 2024-08-08 ENCOUNTER — TELEPHONE (OUTPATIENT)
Age: 64
End: 2024-08-08

## 2024-08-08 ENCOUNTER — TELEPHONE (OUTPATIENT)
Dept: HEMATOLOGY ONCOLOGY | Facility: CLINIC | Age: 64
End: 2024-08-08

## 2024-08-08 NOTE — TELEPHONE ENCOUNTER
Patient calling back because she would like to know the results of her mammaprint test and she cannot find them in [facility]. Results have not been reviewed by Dr. Carrero yet, unable to provide information to patient. Advised that Dr. Carrero would discuss results in detail tomorrow at appointment. Patient questioning if she could please speak to someone today, as she is quite anxious to hear the results. If possible, patient would appreciate call today to briefly review results. She is still planning to come tomorrow to discuss results further.

## 2024-08-08 NOTE — TELEPHONE ENCOUNTER
Spoke with patient and informed her that her mammprint report came back as low risk. Patient would like the report emailed to her. I informed her that I will send that to her now. Patient verbalized understanding.

## 2024-08-08 NOTE — TELEPHONE ENCOUNTER
Received call from patient questioning if mammaprint results were received. Informed patient that mammaprint results have reported and Dr. Carrero can see her tomorrow in the office at 1120 to review them and answer her questions as well. Patient was upset hearing that she has to wait until tomorrow to hear about these results but did confirm appointment. Requested a message to be send to Dr Carrero for her to call her about the results.

## 2024-08-08 NOTE — TELEPHONE ENCOUNTER
Left VM for patient to let her know that her mammaprint results have reported and Dr. Carrero can see her tomorrow in the office at 1120 to review them and answer her questions as well. I asked that she call back to confirm the appt.

## 2024-08-09 ENCOUNTER — OFFICE VISIT (OUTPATIENT)
Dept: HEMATOLOGY ONCOLOGY | Facility: CLINIC | Age: 64
End: 2024-08-09
Payer: COMMERCIAL

## 2024-08-09 ENCOUNTER — TELEPHONE (OUTPATIENT)
Dept: HEMATOLOGY ONCOLOGY | Facility: CLINIC | Age: 64
End: 2024-08-09

## 2024-08-09 VITALS
DIASTOLIC BLOOD PRESSURE: 82 MMHG | TEMPERATURE: 97.6 F | HEIGHT: 64 IN | BODY MASS INDEX: 37.36 KG/M2 | SYSTOLIC BLOOD PRESSURE: 116 MMHG | RESPIRATION RATE: 17 BRPM | WEIGHT: 218.8 LBS

## 2024-08-09 DIAGNOSIS — C50.512 MALIGNANT NEOPLASM OF LOWER-OUTER QUADRANT OF LEFT BREAST OF FEMALE, ESTROGEN RECEPTOR POSITIVE (HCC): Primary | ICD-10-CM

## 2024-08-09 DIAGNOSIS — Z17.0 MALIGNANT NEOPLASM OF LOWER-OUTER QUADRANT OF LEFT BREAST OF FEMALE, ESTROGEN RECEPTOR POSITIVE (HCC): Primary | ICD-10-CM

## 2024-08-09 PROCEDURE — 99214 OFFICE O/P EST MOD 30 MIN: CPT | Performed by: INTERNAL MEDICINE

## 2024-08-09 RX ORDER — ANASTROZOLE 1 MG/1
1 TABLET ORAL DAILY
Qty: 30 TABLET | Refills: 2 | Status: SHIPPED | OUTPATIENT
Start: 2024-08-09

## 2024-08-09 NOTE — TELEPHONE ENCOUNTER
Spoke with patient. She is running about 10 min late. I informed her that we will see her when she arrives.

## 2024-08-12 ENCOUNTER — PATIENT OUTREACH (OUTPATIENT)
Dept: HEMATOLOGY ONCOLOGY | Facility: CLINIC | Age: 64
End: 2024-08-12

## 2024-08-12 NOTE — PROGRESS NOTES
HEMATOLOGY / ONCOLOGY CLINIC FOLLOW UP NOTE    Primary Care Provider: BRIDGER Delaney  Referring Provider:    MRN: [id number]  : 1960    Reason for Encounter: follow up breast cancer       Oncology History Overview Note   2024 - left sided partial mastectomy with SLNBX for an invasive ductal carcinoma, grade 2, ER > 95% SC > 95%, Her2 2+ with negative FISH - pT1c(11mm)N1a(1/3)M0    Mammaprint low risk, luminal A     Malignant neoplasm of lower-outer quadrant of left breast of female, estrogen receptor positive (HCC)   2024 Biopsy    Left breast ultrasound-guided biopsy  4 o'clock, 4 cm from nipple (IDRIS)  Invasive carcinoma of no special type (ductal)  Grade 2  ER >95; SC >95; HER2 2+, FISH negative  Lymphovascular invasion cannot be determined; focus suspicious for    Concordant. Malignancy appears unifocal; suspicious masses cover up to 7 mm on US. Left axilla US negative. Right breast clear.     2024 -  Cancer Staged    Staging form: Breast, AJCC 8th Edition  - Clinical stage from 2024: Stage IA (cT1b, cN0, cM0, G2, ER+, SC+, HER2-) - Signed by Alyssia Nails MD on 6/10/2024  Stage prefix: Initial diagnosis  Method of lymph node assessment: Clinical  Histologic grading system: 3 grade system       2024 Genetic Testing    NEGATIVE: No Clinically Significant Variants Detected  A total of 59 genes were evaluated with RNA insight: APC, ED, BAP1, BARD1, BMPR1A, BRCA1, BRCA2, BRIP1, CDH1, CDK4, CDKN1B, CDKN2A, CHEK2, DICER1, FH, FLCN, KIF1B, MAX, MEN1, MET, MLH1, MSH2, MSH6, MUTYH, NF1, NTHL1, PALB2, PMS2, POT1, PTEN, RAD51C, RAD51D, RB1, RET, SDHA, SDHAF2, SDHB, SDHC, SDHD, SMAD4, SMARCA4, STK11, LTAO884, TP53, TSC1, TSC2 and VHL (sequencing and deletion/duplication); AXIN2, CTNNA1, EGLN1, HOXB13, KIT, MITF, MSH3, PDGFRA, POLD1 and POLE (sequencing only); EPCAM and GREM1 (deletion/duplication only).  Chelsi     2024 Observation    Bilateral breast MRI done per patient's  request.   IMPRESSION:   Biopsy-proven unifocal malignancy in the lower outer left breast; measures up to 1.2 cm.   No findings of malignancy in the right breast.     7/9/2024 Surgery    Left breast IDRIS localized lumpectomy with SLN biopsy  Invasive carcinoma of no special type (ductal)  Grade 2  1.1 cm  Lymphovascular invasion focally present  Margins negative  1/3 Lymph nodes with macrometastasis (6 mm, extranodal extension not identified)     7/9/2024 -  Cancer Staged    Staging form: Breast, AJCC 8th Edition  - Pathologic stage from 7/9/2024: Stage IA (pT1c, pN1a, cM0, G2, ER+, MO+, HER2-) - Signed by Rosalie Soliman MD on 7/23/2024  Histopathologic type: Infiltrating duct carcinoma, NOS  Stage prefix: Initial diagnosis  Nuclear grade: G2  Multigene prognostic tests performed: None  Histologic grading system: 3 grade system  Laterality: Left  Lymph-vascular invasion (LVI): LVI present/identified, NOS           Interval History: Patient presents for follow up of her ER positive breast cancer.  She had a MammaPrint test performed that showed low risk luminal a.  I brought her in a little earlier to review those results.  She also had a DEXA scan that showed a worst T-score of -1.5 in the lumbar spine.  I had ordered a PET scan for staging because she was having low back pain and abdominal bloating.  She had some concerns about the radiation exposure and was asking about thermal imaging as well.  She denies any other new medical problems or concerns.         REVIEW OF SYSTEMS:  Please note that a 14-point review of systems was performed to include Constitutional, HEENT, Respiratory, CVS, GI, , Musculoskeletal, Integumentary, Neurologic, Rheumatologic, Endocrinologic, Psychiatric, Lymphatic, and Hematologic/Oncologic systems were reviewed and are negative unless otherwise stated in HPI. Positive and negative findings pertinent to this evaluation are incorporated into the history of present illness.      ECOG  PS: 0    PROBLEM LIST:  Patient Active Problem List   Diagnosis    Abnormal EKG    Abnormal vaginal bleeding    Acquired hypothyroidism    Anxiety    Chest pain, non-cardiac    Hypercholesteremia    Impaired fasting glucose    Palpitations    Pre-diabetes    COVID-19 virus infection    New onset type 2 diabetes mellitus (HCC)    Obstructive sleep apnea syndrome    Sleep related hypoxia    Gas bloat syndrome    Gastroesophageal reflux disease without esophagitis    Rectal bleeding    Irritable bowel syndrome    Malignant neoplasm of lower-outer quadrant of left breast of female, estrogen receptor positive (HCC)       Assessment / Plan: 64-year-old female with a pT1c N1a ER positive breast cancer with a low risk luminal a MammaPrint score.  She was very happy to hear that she would not need adjuvant chemotherapy.  We discussed adjuvant endocrine therapy with anastrozole. We reviewed the side effects of aromatase inhibitors which include joint aches, worsening of bone density, sexual dysfunction, worsening of mood, difficulty sleeping and hot flashes.  She has very mild osteopenia and I recommended that she take calcium and vitamin D.  I told her she could start the anastrozole 1 to 2 weeks after she completes radiation therapy and I will see her back in 3 months to check on tolerance.  We revisited her concerns about the PET scan.  I am unfamiliar with thermal studies for staging cancer and I do recommend that she get the PET scan because of her symptoms.  We always try to minimize radiation exposure with imaging studies, but in the setting of her symptoms and a positive lymph node I do think it warrants doing the PET scan to calm any concerns of metastatic disease.  She is in agreement with going forward with it and I will call her when I have that result.  She knows to call in the interim with any questions or concerns.         I spent 30 minutes on chart review, face to face counseling time, coordination of care  and documentation.    Past Medical History:   has a past medical history of Anxiety, Colon polyp, COVID-19, CPAP (continuous positive airway pressure) dependence, Disease of thyroid gland, Fatty liver (?), GERD (gastroesophageal reflux disease) (?), Hyperlipidemia, Irritable bowel syndrome, Kidney stone, and Sleep apnea.    PAST SURGICAL HISTORY:   has a past surgical history that includes Breast biopsy (Right); Fracture surgery (Right); [place] tooth extraction; Upper gastrointestinal endoscopy (2021); Colonoscopy (2016?); US guided breast biopsy left complete (Left, 5/23/2024); pr mastectomy partial (Left, 7/9/2024); pr exc breast les preop plmt rad marker open 1 les (Left, 7/9/2024); pr bx/exc lymph node open superficial (Left, 7/9/2024); pr intraop sentinel lymph node id w/dye injection (Left, 7/9/2024); and pr inj radioactive tracer for id of sentinel node (Left, 7/9/2024).    CURRENT MEDICATIONS  Current Outpatient Medications   Medication Sig Dispense Refill    anastrozole (ARIMIDEX) 1 mg tablet Take 1 tablet (1 mg total) by mouth daily 30 tablet 2    levothyroxine 100 mcg tablet Take 1 tablet (100 mcg total) by mouth daily 100 tablet 1    pantoprazole (PROTONIX) 40 mg tablet Take 1 tablet (40 mg total) by mouth daily (Patient taking differently: Take 40 mg by mouth as needed) 90 tablet 0    polyethylene glycol (GLYCOLAX) 17 GM/SCOOP powder Take 17 g by mouth daily (Patient not taking: Reported on 6/10/2024) 255 g 0     No current facility-administered medications for this visit.     [unfilled]    SOCIAL HISTORY:   reports that she has never smoked. She has never used smokeless tobacco. She reports current alcohol use of about 6.0 - 8.0 standard drinks of alcohol per week. She reports that she does not use drugs.     FAMILY HISTORY:  family history includes Dementia in her mother and sister; Diabetes in her mother; Heart failure in her mother; Hypothyroidism in her mother, sister, and sister; No Known Problems  "in her maternal grandfather, maternal grandmother, paternal grandfather, paternal grandmother, and sister; Pancreatic cancer in her father; Throat cancer in her maternal uncle; Uterine cancer (age of onset: 62) in her sister.     ALLERGIES:  is allergic to sulfa antibiotics and statins.      Physical Exam:  Vital Signs:   Visit Vitals  /82 (BP Location: Right arm, Patient Position: Sitting, Cuff Size: Adult)   Temp 97.6 °F (36.4 °C)   Resp 17   Ht 5' 3.5\" (1.613 m)   Wt 99.2 kg (218 lb 12.8 oz)   BMI 38.15 kg/m²   OB Status Postmenopausal   Smoking Status Never   BSA 2.02 m²     Body mass index is 38.15 kg/m².  Body surface area is 2.02 meters squared.    GEN: Alert, awake oriented x3, in no acute distress  HEENT- No pallor, icterus, cyanosis, no oral mucosal lesions,   LAD - no palpable cervical, clavicle, axillary, inguinal LAD  Heart- normal S1 S2, regular rate and rhythm, No murmur, rubs.   Lungs- clear breathing sound bilateral.   Abdomen- soft, Non tender, bowel sounds present  Extremities- No cyanosis, clubbing, edema  Neuro- No focal neurological deficit    Labs:  Lab Results   Component Value Date    WBC 6.55 06/24/2024    HGB 13.7 06/24/2024    HCT 42.5 06/24/2024    MCV 91 06/24/2024     06/24/2024     Lab Results   Component Value Date     12/30/2016    SODIUM 140 06/24/2024    K 4.1 06/24/2024     06/24/2024    CO2 26 06/24/2024    ANIONGAP 11 07/22/2015    AGAP 9 06/24/2024    BUN 10 06/24/2024    CREATININE 0.79 06/24/2024    GLUC 103 06/24/2024    CALCIUM 9.7 06/24/2024    AST 27 06/24/2024    ALT 27 06/24/2024    ALKPHOS 87 06/24/2024    PROT 7.6 12/30/2016    TP 7.5 06/24/2024    BILITOT 0.8 12/30/2016    TBILI 0.58 06/24/2024    EGFR 80 06/24/2024       "

## 2024-08-12 NOTE — PROGRESS NOTES
I reached out and spoke with Pascale now that consults have been completed with the oncology teams to review for any barriers to care and offer supportive services as needed. I reviewed and updated the members assigned to the care team in [facility].   She knows the members of the care team as well as how and when to contact them with any needs.   She verbalizes managing the schedules well.   She is currently able to drive and denies any transportation needs.    She denies any uncontrolled symptoms. Discussed role of Palliative Care in symptom and side effect management. Declined referral at this time.  Patients states that she is eating and drinking as per usual with no unintentional weight loss.     Patient does not smoke.   Patient states she is well supported by family and friends.    Patient feels she has adequate insurance coverage and denies any financial concerns at this time.     Based on their individual needs I will follow up with them in about 4-6 weeks.   I have provided my direct contact information and welcome them to contact me if their needs as discussed above change. They were appreciative for the call.

## 2024-08-14 ENCOUNTER — PATIENT OUTREACH (OUTPATIENT)
Dept: HEMATOLOGY ONCOLOGY | Facility: CLINIC | Age: 64
End: 2024-08-14

## 2024-08-14 ENCOUNTER — TELEPHONE (OUTPATIENT)
Facility: HOSPITAL | Age: 64
End: 2024-08-14

## 2024-08-14 ENCOUNTER — TELEPHONE (OUTPATIENT)
Age: 64
End: 2024-08-14

## 2024-08-14 NOTE — PROGRESS NOTES
Breast Oncology Nurse Navigator    Received voicemail from patient. 'Hi, Marie, this is Juan Carlos Morgan calling. PACE, I'm not even sure if you're the right person to talk to. I talked to somebody different nurses, but this is regarding my radiation. I'm supposed to get fitted for tomorrow. I got a letter in the mail from my 's insurance [employer] administrators working with [employer] stating that my one radiation therapy was denied for approval for insurance purposes. And I've been speaking to like 5 different people and I was just wondering if I can get a hold of Doctor Soliman or a nurse with Doctor Soliman to find out my radiation treatment now. Apparently it has changed since they didn't approve what Doctor Jourdan originally had told them it would be. I'm confused. Juan Carlos Velez [phone] if you or another nurse or somebody could call me back. I just want to know like did they change the insurance? I mean, the treatment did they make doctor has changed treatment because they wouldn't approve the one she wanted to do. Is it going to be less effective? Is it all because of insurance? What's going on? I don't know, but I did have a thing that said it was denied, but another one could be accepted or whatever. All right. Juan Carlos Morgan, [phone] Thank you.'    Called patient back, she said she did speak with someone who said she was covered. Patient is still unsure if it is because she received the letter yesterday. zSent a message to the rad onc clinical pool. Patient confirmed she will be going to her appointment tomorrow.

## 2024-08-14 NOTE — TELEPHONE ENCOUNTER
Tianna is calling from [employer] stating that Pascale's prior auth has been denied and now Dr Soliman needs to do a peer to peer review with them.  They can be reached at [phone].

## 2024-08-14 NOTE — TELEPHONE ENCOUNTER
Patient called in stating she received a letter in the mail stating insurance is not covering RT treatments. Patient will like a call back from  to discuss due to having SIM scheduled for tomorrow and she will need to know what to do next.       Thanks!

## 2024-08-15 ENCOUNTER — APPOINTMENT (OUTPATIENT)
Facility: HOSPITAL | Age: 64
End: 2024-08-15
Attending: RADIOLOGY
Payer: COMMERCIAL

## 2024-08-15 PROCEDURE — 77290 THER RAD SIMULAJ FIELD CPLX: CPT | Performed by: RADIOLOGY

## 2024-08-15 PROCEDURE — 77334 RADIATION TREATMENT AID(S): CPT | Performed by: RADIOLOGY

## 2024-08-15 PROCEDURE — 77263 THER RADIOLOGY TX PLNG CPLX: CPT | Performed by: RADIOLOGY

## 2024-08-19 ENCOUNTER — TELEPHONE (OUTPATIENT)
Age: 64
End: 2024-08-19

## 2024-08-19 ENCOUNTER — TELEPHONE (OUTPATIENT)
Dept: ADMINISTRATIVE | Facility: HOSPITAL | Age: 64
End: 2024-08-19

## 2024-08-19 DIAGNOSIS — Z17.0 MALIGNANT NEOPLASM OF LOWER-OUTER QUADRANT OF LEFT BREAST OF FEMALE, ESTROGEN RECEPTOR POSITIVE (HCC): ICD-10-CM

## 2024-08-19 DIAGNOSIS — Z78.0 ASYMPTOMATIC MENOPAUSE: Primary | ICD-10-CM

## 2024-08-19 DIAGNOSIS — C50.512 MALIGNANT NEOPLASM OF LOWER-OUTER QUADRANT OF LEFT BREAST OF FEMALE, ESTROGEN RECEPTOR POSITIVE (HCC): ICD-10-CM

## 2024-08-19 NOTE — TELEPHONE ENCOUNTER
Spoke with patient to inform her of her appts for her scans scheduled for 9/4. Patient is wondering if she can get radiation completed without having these scans done. I informed her that I would reach out to her rad onc team regarding that. Patient also would like scans sooner. I informed her that I will call tomorrow to see if there is anything sooner. Patient verbalized understanding.

## 2024-08-19 NOTE — TELEPHONE ENCOUNTER
Spoke with patient to let her know that her insurance is denying her NM PET CT. I informed her that they will approve a CT CAP and Bone Scan in it's place. Patient is frustrated with her insurance and doesn't understand why they will not cover it. I informed her that Dr. Carrero even spoke with a physician through the insurance company and they are not approving the PET CT. Patient is agreeable to the plan of CT and bone scan. I informed her that I will call her once it is scheduled. Patient verbalized understanding.

## 2024-08-20 ENCOUNTER — TELEPHONE (OUTPATIENT)
Dept: HEMATOLOGY ONCOLOGY | Facility: CLINIC | Age: 64
End: 2024-08-20

## 2024-08-20 NOTE — TELEPHONE ENCOUNTER
Spoke with patient to advise her that the PET CT was denied and the CT and bone scan were approved. Patient and her spouse verbalized understanding.

## 2024-08-20 NOTE — TELEPHONE ENCOUNTER
Called patient and left her a voicemail informing her we will start her radiation after she has had the scans completed.

## 2024-08-20 NOTE — TELEPHONE ENCOUNTER
Patients insurance company called stating PET CT scan prior auth was denied. Insurance company was transferred over to billing to further discuss.

## 2024-08-20 NOTE — TELEPHONE ENCOUNTER
Spoke with patient to let her know I was able to get her in sooner for her scans on 8/29. I informed her that I will continue calling to see if there is anything sooner available for her. Patient states that her  called the insurance company and the PET CT was approved. I informed her that I will speak with our finance dept because I was not aware of this approval. I informed the patient that I will call her by the end of the day to let her know about approvals and what imaging she will be getting done. Patient verbalized understanding.

## 2024-08-21 ENCOUNTER — TELEPHONE (OUTPATIENT)
Age: 64
End: 2024-08-21

## 2024-08-21 NOTE — TELEPHONE ENCOUNTER
Spoke with central scheduling to get the patient scheduled for UB instead of BE. There were IT issues and unable to be scheduled at this time. I will call again later.

## 2024-08-22 ENCOUNTER — LAB REQUISITION (OUTPATIENT)
Dept: LAB | Facility: HOSPITAL | Age: 64
End: 2024-08-22

## 2024-08-22 DIAGNOSIS — C50.512 MALIGNANT NEOPLASM OF LOWER-OUTER QUADRANT OF LEFT FEMALE BREAST (HCC): ICD-10-CM

## 2024-08-22 LAB — SCAN RESULT: NORMAL

## 2024-08-28 ENCOUNTER — HOSPITAL ENCOUNTER (OUTPATIENT)
Dept: NUCLEAR MEDICINE | Facility: HOSPITAL | Age: 64
Discharge: HOME | End: 2024-08-28
Attending: INTERNAL MEDICINE
Payer: COMMERCIAL

## 2024-08-28 DIAGNOSIS — Z17.0 MALIGNANT NEOPLASM OF LOWER-OUTER QUADRANT OF LEFT BREAST OF FEMALE, ESTROGEN RECEPTOR POSITIVE (HCC): ICD-10-CM

## 2024-08-28 DIAGNOSIS — C50.512 MALIGNANT NEOPLASM OF LOWER-OUTER QUADRANT OF LEFT BREAST OF FEMALE, ESTROGEN RECEPTOR POSITIVE (HCC): ICD-10-CM

## 2024-08-28 PROCEDURE — 78306 BONE IMAGING WHOLE BODY: CPT

## 2024-08-28 PROCEDURE — A9503 TC99M MEDRONATE: HCPCS

## 2024-08-29 ENCOUNTER — TELEPHONE (OUTPATIENT)
Age: 64
End: 2024-08-29

## 2024-08-29 PROCEDURE — 77295 3-D RADIOTHERAPY PLAN: CPT | Performed by: RADIOLOGY

## 2024-08-29 PROCEDURE — 77300 RADIATION THERAPY DOSE PLAN: CPT | Performed by: RADIOLOGY

## 2024-08-29 PROCEDURE — 77334 RADIATION TREATMENT AID(S): CPT | Performed by: RADIOLOGY

## 2024-09-03 ENCOUNTER — TELEPHONE (OUTPATIENT)
Age: 64
End: 2024-09-03

## 2024-09-03 NOTE — TELEPHONE ENCOUNTER
Patient is returning call to Amparo Scanlon RN about radiation scheduling.  Please call her back asap.

## 2024-09-03 NOTE — TELEPHONE ENCOUNTER
Spoke to patient regarding rescheduling her start date for Radiation. She is unable to start 9/5/24. Offered her start date of 9/9/24 at 3pm which she was ok with. She then asked questions regarding positioning during radiation. RN informed her she will be positioned for RT the way she was positioned in CT SIM which was on her back with arms back. She had some questions regarding this. Staff will call back to discuss.

## 2024-09-04 ENCOUNTER — HOSPITAL ENCOUNTER (OUTPATIENT)
Dept: CT IMAGING | Facility: HOSPITAL | Age: 64
Discharge: HOME | End: 2024-09-04
Attending: INTERNAL MEDICINE
Payer: COMMERCIAL

## 2024-09-04 DIAGNOSIS — Z17.0 MALIGNANT NEOPLASM OF LOWER-OUTER QUADRANT OF LEFT BREAST OF FEMALE, ESTROGEN RECEPTOR POSITIVE (HCC): ICD-10-CM

## 2024-09-04 DIAGNOSIS — C50.512 MALIGNANT NEOPLASM OF LOWER-OUTER QUADRANT OF LEFT BREAST OF FEMALE, ESTROGEN RECEPTOR POSITIVE (HCC): ICD-10-CM

## 2024-09-04 PROCEDURE — 74177 CT ABD & PELVIS W/CONTRAST: CPT

## 2024-09-04 PROCEDURE — 71260 CT THORAX DX C+: CPT

## 2024-09-04 RX ADMIN — IOHEXOL 100 ML: 350 INJECTION, SOLUTION INTRAVENOUS at 19:44

## 2024-09-05 ENCOUNTER — APPOINTMENT (OUTPATIENT)
Facility: HOSPITAL | Age: 64
End: 2024-09-05
Attending: RADIOLOGY
Payer: COMMERCIAL

## 2024-09-06 ENCOUNTER — TELEPHONE (OUTPATIENT)
Dept: HEMATOLOGY ONCOLOGY | Facility: CLINIC | Age: 64
End: 2024-09-06

## 2024-09-06 NOTE — TELEPHONE ENCOUNTER
Attempted to call patient to go over CT scan results, but could not be heard on the phone. [employer] message being sent to the patient to review.

## 2024-09-06 NOTE — TELEPHONE ENCOUNTER
Spoke with patient to let her know that her CT scan showed no evidence of metastatic cancer. I informed her that the fatty liver is due to diet and is not of concern at this time. Patient verbalized understanding and is in agreement with the plan.

## 2024-09-09 ENCOUNTER — APPOINTMENT (OUTPATIENT)
Facility: HOSPITAL | Age: 64
End: 2024-09-09
Attending: RADIOLOGY
Payer: COMMERCIAL

## 2024-09-09 DIAGNOSIS — R10.84 GENERALIZED ABDOMINAL PAIN: ICD-10-CM

## 2024-09-09 PROCEDURE — 77280 THER RAD SIMULAJ FIELD SMPL: CPT | Performed by: RADIOLOGY

## 2024-09-09 PROCEDURE — 77387 GUIDANCE FOR RADJ TX DLVR: CPT | Performed by: RADIOLOGY

## 2024-09-09 PROCEDURE — 77412 RADIATION TX DELIVERY LVL 3: CPT | Performed by: RADIOLOGY

## 2024-09-09 RX ORDER — PANTOPRAZOLE SODIUM 40 MG/1
40 TABLET, DELAYED RELEASE ORAL DAILY
Qty: 90 TABLET | Refills: 1 | Status: SHIPPED | OUTPATIENT
Start: 2024-09-09

## 2024-09-10 PROCEDURE — 77387 GUIDANCE FOR RADJ TX DLVR: CPT | Performed by: RADIOLOGY

## 2024-09-10 PROCEDURE — 77412 RADIATION TX DELIVERY LVL 3: CPT | Performed by: RADIOLOGY

## 2024-09-10 PROCEDURE — G6017 INTRAFRACTION TRACK MOTION: HCPCS | Performed by: RADIOLOGY

## 2024-09-11 PROCEDURE — G6017 INTRAFRACTION TRACK MOTION: HCPCS | Performed by: RADIOLOGY

## 2024-09-11 PROCEDURE — 77387 GUIDANCE FOR RADJ TX DLVR: CPT | Performed by: RADIOLOGY

## 2024-09-11 PROCEDURE — 77412 RADIATION TX DELIVERY LVL 3: CPT | Performed by: RADIOLOGY

## 2024-09-13 PROCEDURE — 77336 RADIATION PHYSICS CONSULT: CPT | Performed by: RADIOLOGY

## 2024-09-16 PROCEDURE — 77387 GUIDANCE FOR RADJ TX DLVR: CPT | Performed by: RADIOLOGY

## 2024-09-16 PROCEDURE — 77412 RADIATION TX DELIVERY LVL 3: CPT | Performed by: RADIOLOGY

## 2024-09-16 PROCEDURE — G6017 INTRAFRACTION TRACK MOTION: HCPCS | Performed by: RADIOLOGY

## 2024-09-16 PROCEDURE — 77427 RADIATION TX MANAGEMENT X5: CPT | Performed by: RADIOLOGY

## 2024-09-17 PROCEDURE — 77412 RADIATION TX DELIVERY LVL 3: CPT | Performed by: RADIOLOGY

## 2024-09-17 PROCEDURE — 77387 GUIDANCE FOR RADJ TX DLVR: CPT | Performed by: RADIOLOGY

## 2024-09-17 PROCEDURE — G6017 INTRAFRACTION TRACK MOTION: HCPCS | Performed by: RADIOLOGY

## 2024-09-18 PROCEDURE — 77387 GUIDANCE FOR RADJ TX DLVR: CPT | Performed by: RADIOLOGY

## 2024-09-18 PROCEDURE — 77412 RADIATION TX DELIVERY LVL 3: CPT | Performed by: RADIOLOGY

## 2024-09-18 PROCEDURE — G6017 INTRAFRACTION TRACK MOTION: HCPCS | Performed by: RADIOLOGY

## 2024-09-19 PROCEDURE — 77412 RADIATION TX DELIVERY LVL 3: CPT | Performed by: RADIOLOGY

## 2024-09-19 PROCEDURE — G6017 INTRAFRACTION TRACK MOTION: HCPCS | Performed by: RADIOLOGY

## 2024-09-19 PROCEDURE — 77387 GUIDANCE FOR RADJ TX DLVR: CPT | Performed by: RADIOLOGY

## 2024-09-20 PROCEDURE — 77336 RADIATION PHYSICS CONSULT: CPT | Performed by: RADIOLOGY

## 2024-09-20 PROCEDURE — 77387 GUIDANCE FOR RADJ TX DLVR: CPT | Performed by: RADIOLOGY

## 2024-09-20 PROCEDURE — G6017 INTRAFRACTION TRACK MOTION: HCPCS | Performed by: RADIOLOGY

## 2024-09-20 PROCEDURE — 77412 RADIATION TX DELIVERY LVL 3: CPT | Performed by: RADIOLOGY

## 2024-09-23 PROCEDURE — 77412 RADIATION TX DELIVERY LVL 3: CPT | Performed by: RADIOLOGY

## 2024-09-23 PROCEDURE — 77387 GUIDANCE FOR RADJ TX DLVR: CPT | Performed by: RADIOLOGY

## 2024-09-23 PROCEDURE — 77427 RADIATION TX MANAGEMENT X5: CPT | Performed by: RADIOLOGY

## 2024-09-23 PROCEDURE — G6017 INTRAFRACTION TRACK MOTION: HCPCS | Performed by: RADIOLOGY

## 2024-09-24 PROCEDURE — 77387 GUIDANCE FOR RADJ TX DLVR: CPT | Performed by: RADIOLOGY

## 2024-09-24 PROCEDURE — 77412 RADIATION TX DELIVERY LVL 3: CPT | Performed by: RADIOLOGY

## 2024-09-25 PROCEDURE — 77412 RADIATION TX DELIVERY LVL 3: CPT | Performed by: RADIOLOGY

## 2024-09-25 PROCEDURE — G6017 INTRAFRACTION TRACK MOTION: HCPCS | Performed by: RADIOLOGY

## 2024-09-25 PROCEDURE — 77387 GUIDANCE FOR RADJ TX DLVR: CPT | Performed by: RADIOLOGY

## 2024-09-26 PROCEDURE — 77387 GUIDANCE FOR RADJ TX DLVR: CPT | Performed by: RADIOLOGY

## 2024-09-26 PROCEDURE — 77412 RADIATION TX DELIVERY LVL 3: CPT | Performed by: RADIOLOGY

## 2024-09-27 PROCEDURE — 77387 GUIDANCE FOR RADJ TX DLVR: CPT | Performed by: RADIOLOGY

## 2024-09-27 PROCEDURE — 77412 RADIATION TX DELIVERY LVL 3: CPT | Performed by: RADIOLOGY

## 2024-09-27 PROCEDURE — G6017 INTRAFRACTION TRACK MOTION: HCPCS | Performed by: RADIOLOGY

## 2024-09-27 PROCEDURE — 77336 RADIATION PHYSICS CONSULT: CPT | Performed by: RADIOLOGY

## 2024-09-30 ENCOUNTER — PATIENT OUTREACH (OUTPATIENT)
Dept: HEMATOLOGY ONCOLOGY | Facility: CLINIC | Age: 64
End: 2024-09-30

## 2024-09-30 NOTE — PROGRESS NOTES
I reached out and spoke to [employer]asc[employer] to follow up with them and to review for any changes in barriers to care and offer supportive services as needed.    Barriers noted previously: co-morbidities    Current barriers and interventions provided:  co-morbidities      This patient will no longer require follow up.  I have provided care team contact information to the patient and welcome them to contact me if their needs as discussed above change.  Patient is already set up with an appointment for Survivorship and is aware who to contact if her needs change. Patient was appreciative of the phone call.

## 2024-10-01 ENCOUNTER — APPOINTMENT (OUTPATIENT)
Facility: HOSPITAL | Age: 64
End: 2024-10-01
Attending: RADIOLOGY
Payer: COMMERCIAL

## 2024-10-01 PROCEDURE — 77280 THER RAD SIMULAJ FIELD SMPL: CPT | Performed by: RADIOLOGY

## 2024-10-01 PROCEDURE — 77412 RADIATION TX DELIVERY LVL 3: CPT | Performed by: RADIOLOGY

## 2024-10-01 PROCEDURE — 77387 GUIDANCE FOR RADJ TX DLVR: CPT | Performed by: RADIOLOGY

## 2024-10-02 PROCEDURE — 77331 SPECIAL RADIATION DOSIMETRY: CPT | Performed by: RADIOLOGY

## 2024-10-02 PROCEDURE — 77387 GUIDANCE FOR RADJ TX DLVR: CPT | Performed by: RADIOLOGY

## 2024-10-02 PROCEDURE — G6017 INTRAFRACTION TRACK MOTION: HCPCS | Performed by: RADIOLOGY

## 2024-10-02 PROCEDURE — 77412 RADIATION TX DELIVERY LVL 3: CPT | Performed by: RADIOLOGY

## 2024-10-03 PROCEDURE — 77387 GUIDANCE FOR RADJ TX DLVR: CPT | Performed by: RADIOLOGY

## 2024-10-03 PROCEDURE — G6017 INTRAFRACTION TRACK MOTION: HCPCS | Performed by: RADIOLOGY

## 2024-10-03 PROCEDURE — 77412 RADIATION TX DELIVERY LVL 3: CPT | Performed by: RADIOLOGY

## 2024-10-04 ENCOUNTER — DOCUMENTATION (OUTPATIENT)
Dept: HEMATOLOGY ONCOLOGY | Facility: CLINIC | Age: 64
End: 2024-10-04

## 2024-10-04 ENCOUNTER — TELEPHONE (OUTPATIENT)
Dept: HEMATOLOGY ONCOLOGY | Facility: CLINIC | Age: 64
End: 2024-10-04

## 2024-10-04 ENCOUNTER — PATIENT OUTREACH (OUTPATIENT)
Dept: HEMATOLOGY ONCOLOGY | Facility: CLINIC | Age: 64
End: 2024-10-04

## 2024-10-04 PROCEDURE — 77412 RADIATION TX DELIVERY LVL 3: CPT | Performed by: RADIOLOGY

## 2024-10-04 PROCEDURE — G6017 INTRAFRACTION TRACK MOTION: HCPCS | Performed by: RADIOLOGY

## 2024-10-04 PROCEDURE — 77387 GUIDANCE FOR RADJ TX DLVR: CPT | Performed by: RADIOLOGY

## 2024-10-04 NOTE — PROGRESS NOTES
"Breast Oncology Nurse Navigator    Patient called in to REYNA Salazar and left the following voicemail:    \"Dedrick Rios, this is Juan Carlos Pace. PACE, it's been a while since I called you. I just called Doctor Soliman and I just realized as I left that I forgot to ask her about the cancer medicine I'm supposed to take. Tomorrow is my very last day of radiation, so I totally forgot to ask her when I'm supposed to start taking the medicine that they prescribed me. [phone] Jauregui Pace. And I just saw Doctor Jourdan, forgot to ask her when I should take the medicine. So if somebody could let me know, that would be great. Thank you. Bye bye.\"    Routing this message to Dr Carrero's staff to review medication instructions with the patient.  "

## 2024-10-04 NOTE — TELEPHONE ENCOUNTER
Spoke with patient to let her know that she can start the anastrozole in 2 weeks. Patient verbalized understanding and is in agreement with the plan.

## 2024-10-04 NOTE — PROGRESS NOTES
Pt called and left a message:    Dedrick Rios, this is Juan Carlos Pace. PACE, it's been a while since I called you. I just called Doctor Jourdan and I just realized as I left that I forgot to ask her about the cancer medicine I'm supposed to take. Tomorrow is my very last day of radiation, so I totally forgot to ask her when I'm supposed to start taking the medicine that they prescribed me. [phone] Jauregui Pace. And I just saw Doctor Jourdan, forgot to ask her when I should take the medicine. So if somebody could let me know, that would be great. Thank you. Bye bye.      I did outreach Pt but had to leave a voice message asking her to return my call at her convenience.

## 2024-10-09 DIAGNOSIS — C50.512 MALIGNANT NEOPLASM OF LOWER-OUTER QUADRANT OF LEFT BREAST OF FEMALE, ESTROGEN RECEPTOR POSITIVE (HCC): ICD-10-CM

## 2024-10-09 DIAGNOSIS — Z17.0 MALIGNANT NEOPLASM OF LOWER-OUTER QUADRANT OF LEFT BREAST OF FEMALE, ESTROGEN RECEPTOR POSITIVE (HCC): ICD-10-CM

## 2024-10-09 RX ORDER — ANASTROZOLE 1 MG/1
1 TABLET ORAL DAILY
Qty: 90 TABLET | Refills: 2 | Status: SHIPPED | OUTPATIENT
Start: 2024-10-09 | End: 2024-10-09 | Stop reason: SDUPTHER

## 2024-10-10 RX ORDER — ANASTROZOLE 1 MG/1
1 TABLET ORAL DAILY
Qty: 90 TABLET | Refills: 2 | Status: SHIPPED | OUTPATIENT
Start: 2024-10-10

## 2024-10-25 DIAGNOSIS — E03.9 HYPOTHYROIDISM, UNSPECIFIED TYPE: ICD-10-CM

## 2024-10-25 RX ORDER — LEVOTHYROXINE SODIUM 100 UG/1
100 TABLET ORAL DAILY
Qty: 100 TABLET | Refills: 1 | Status: SHIPPED | OUTPATIENT
Start: 2024-10-25

## 2024-11-06 ENCOUNTER — OFFICE VISIT (OUTPATIENT)
Age: 64
End: 2024-11-06
Payer: COMMERCIAL

## 2024-11-06 VITALS
TEMPERATURE: 97.6 F | SYSTOLIC BLOOD PRESSURE: 128 MMHG | RESPIRATION RATE: 18 BRPM | BODY MASS INDEX: 37.05 KG/M2 | DIASTOLIC BLOOD PRESSURE: 78 MMHG | HEART RATE: 96 BPM | HEIGHT: 64 IN | OXYGEN SATURATION: 97 % | WEIGHT: 217 LBS

## 2024-11-06 DIAGNOSIS — Z17.0 MALIGNANT NEOPLASM OF LOWER-OUTER QUADRANT OF LEFT BREAST OF FEMALE, ESTROGEN RECEPTOR POSITIVE (HCC): Primary | ICD-10-CM

## 2024-11-06 DIAGNOSIS — C50.512 MALIGNANT NEOPLASM OF LOWER-OUTER QUADRANT OF LEFT BREAST OF FEMALE, ESTROGEN RECEPTOR POSITIVE (HCC): Primary | ICD-10-CM

## 2024-11-06 PROCEDURE — 99213 OFFICE O/P EST LOW 20 MIN: CPT | Performed by: INTERNAL MEDICINE

## 2024-11-10 NOTE — PROGRESS NOTES
HEMATOLOGY / ONCOLOGY CLINIC FOLLOW UP NOTE    Primary Care Provider: BRIDGER Delaney  Referring Provider:    MRN: [id number]  : 1960    Reason for Encounter: follow up breast cancer       Oncology History Overview Note   2024 - left sided partial mastectomy with SLNBX for an invasive ductal carcinoma, grade 2, ER > 95% ID > 95%, Her2 2+ with negative FISH - pT1c(11mm)N1a(1/3)M0     Mammaprint low risk, luminal A     2024 RT    10/2024 - start anastrozole     Malignant neoplasm of lower-outer quadrant of left breast of female, estrogen receptor positive (HCC)   2024 Biopsy    Left breast ultrasound-guided biopsy  4 o'clock, 4 cm from nipple (IDRIS)  Invasive carcinoma of no special type (ductal)  Grade 2  ER >95; ID >95; HER2 2+, FISH negative  Lymphovascular invasion cannot be determined; focus suspicious for    Concordant. Malignancy appears unifocal; suspicious masses cover up to 7 mm on US. Left axilla US negative. Right breast clear.     2024 -  Cancer Staged    Staging form: Breast, AJCC 8th Edition  - Clinical stage from 2024: Stage IA (cT1b, cN0, cM0, G2, ER+, ID+, HER2-) - Signed by Alyssia Nails MD on 6/10/2024  Stage prefix: Initial diagnosis  Method of lymph node assessment: Clinical  Histologic grading system: 3 grade system       2024 Genetic Testing    NEGATIVE: No Clinically Significant Variants Detected  A total of 59 genes were evaluated with RNA insight: APC, ED, BAP1, BARD1, BMPR1A, BRCA1, BRCA2, BRIP1, CDH1, CDK4, CDKN1B, CDKN2A, CHEK2, DICER1, FH, FLCN, KIF1B, MAX, MEN1, MET, MLH1, MSH2, MSH6, MUTYH, NF1, NTHL1, PALB2, PMS2, POT1, PTEN, RAD51C, RAD51D, RB1, RET, SDHA, SDHAF2, SDHB, SDHC, SDHD, SMAD4, SMARCA4, STK11, FLSH137, TP53, TSC1, TSC2 and VHL (sequencing and deletion/duplication); AXIN2, CTNNA1, EGLN1, HOXB13, KIT, MITF, MSH3, PDGFRA, POLD1 and POLE (sequencing only); EPCAM and GREM1 (deletion/duplication only).  [facility]     2024 Observation     Bilateral breast MRI done per patient's request.   IMPRESSION:   Biopsy-proven unifocal malignancy in the lower outer left breast; measures up to 1.2 cm.   No findings of malignancy in the right breast.     7/9/2024 Surgery    Left breast IDRIS localized lumpectomy with SLN biopsy  Invasive carcinoma of no special type (ductal)  Grade 2  1.1 cm  Lymphovascular invasion focally present  Margins negative  1/3 Lymph nodes with macrometastasis (6 mm, extranodal extension not identified)     7/9/2024 -  Cancer Staged    Staging form: Breast, AJCC 8th Edition  - Pathologic stage from 7/9/2024: Stage IA (pT1c, pN1a, cM0, G2, ER+, CT+, HER2-) - Signed by Rosalie Soliman MD on 7/23/2024  Histopathologic type: Infiltrating duct carcinoma, NOS  Stage prefix: Initial diagnosis  Nuclear grade: G2  Multigene prognostic tests performed: None  Histologic grading system: 3 grade system  Laterality: Left  Lymph-vascular invasion (LVI): LVI present/identified, NOS       9/9/2024 - 10/4/2024 Radiation    Treatment:  Course: C1    Plan ID Energy Fractions Dose per Fraction (cGy) Dose Correction (cGy) Total Dose Delivered (cGy) Elapsed Days   BH L Boost 10X 4 / 4 250 0 1,000 3   BH L Sclav 10X 15 / 15 267 0 4,005 18   Lt Breast_BH 10X/6X 15 / 15 267 0 4,005 18      Treatment dates:  C1: 9/9/2024 - 10/4/2024         Interval History: Patient presents for follow up of her ER positive breast cancer.  She had a CT and bone scan for staging that did not show any evidence of metastatic disease.  She has started anastrozole after radiation and is tolerating it well.  She denies any joint stiffness or hot flashes.  She still does have some skin irritation after radiation therapy.  No other new medical problems.         REVIEW OF SYSTEMS:  Please note that a 14-point review of systems was performed to include Constitutional, HEENT, Respiratory, CVS, GI, , Musculoskeletal, Integumentary, Neurologic, Rheumatologic, Endocrinologic,  Psychiatric, Lymphatic, and Hematologic/Oncologic systems were reviewed and are negative unless otherwise stated in HPI. Positive and negative findings pertinent to this evaluation are incorporated into the history of present illness.      ECOG PS: 0    PROBLEM LIST:  Patient Active Problem List   Diagnosis    Abnormal EKG    Abnormal vaginal bleeding    Acquired hypothyroidism    Anxiety    Chest pain, non-cardiac    Hypercholesteremia    Impaired fasting glucose    Palpitations    Pre-diabetes    COVID-19 virus infection    New onset type 2 diabetes mellitus (HCC)    Obstructive sleep apnea syndrome    Sleep related hypoxia    Gas bloat syndrome    Gastroesophageal reflux disease without esophagitis    Rectal bleeding    Irritable bowel syndrome    Malignant neoplasm of lower-outer quadrant of left breast of female, estrogen receptor positive (HCC)       Assessment / Plan: 64-year-old female with a pT1c N1a ER positive breast cancer.  She is tolerating anastrozole well.  No adjuvant chemotherapy was indicated due to low MammaPrint.  She has no evidence of distant metastatic disease.  She will see my nurse practitioner in 6 months with a CBC and CMP prior.  The surgery team can order her breast imaging in the future.  She knows to call in the interim with any questions or concerns.       I spent 20 minutes on chart review, face to face counseling time, coordination of care and documentation.    Past Medical History:   has a past medical history of Anxiety, Colon polyp, COVID-19, CPAP (continuous positive airway pressure) dependence, Disease of thyroid gland, Fatty liver (?), GERD (gastroesophageal reflux disease) (?), Hyperlipidemia, Irritable bowel syndrome, Kidney stone, and Sleep apnea.    PAST SURGICAL HISTORY:   has a past surgical history that includes Breast biopsy (Right); Fracture surgery (Right); Chili tooth extraction; Upper gastrointestinal endoscopy (2021); Colonoscopy (2016?); US guided breast biopsy  "left complete (Left, 5/23/2024); pr mastectomy partial (Left, 7/9/2024); pr exc breast les preop plmt rad marker open 1 les (Left, 7/9/2024); pr bx/exc lymph node open superficial (Left, 7/9/2024); pr intraop sentinel lymph node id w/dye injection (Left, 7/9/2024); and pr inj radioactive tracer for id of sentinel node (Left, 7/9/2024).    CURRENT MEDICATIONS  Current Outpatient Medications   Medication Sig Dispense Refill    anastrozole (ARIMIDEX) 1 mg tablet Take 1 tablet (1 mg total) by mouth daily 90 tablet 2    levothyroxine 100 mcg tablet Take 1 tablet (100 mcg total) by mouth daily 100 tablet 1    pantoprazole (PROTONIX) 40 mg tablet Take 1 tablet (40 mg total) by mouth daily 90 tablet 1    polyethylene glycol (GLYCOLAX) 17 GM/SCOOP powder Take 17 g by mouth daily (Patient not taking: Reported on 6/10/2024) 255 g 0     No current facility-administered medications for this visit.     [unfilled]    SOCIAL HISTORY:   reports that she has never smoked. She has never used smokeless tobacco. She reports current alcohol use of about 6.0 - 8.0 standard drinks of alcohol per week. She reports that she does not use drugs.     FAMILY HISTORY:  family history includes Dementia in her mother and sister; Diabetes in her mother; Heart failure in her mother; Hypothyroidism in her mother, sister, and sister; No Known Problems in her maternal grandfather, maternal grandmother, paternal grandfather, paternal grandmother, and sister; Pancreatic cancer in her father; Throat cancer in her maternal uncle; Uterine cancer (age of onset: 62) in her sister.     ALLERGIES:  is allergic to sulfa antibiotics and statins.      Physical Exam:  Vital Signs:   Visit Vitals  /78 (BP Location: Right arm, Patient Position: Sitting, Cuff Size: Adult)   Pulse 96   Temp 97.6 °F (36.4 °C) (Temporal)   Resp 18   Ht 5' 3.5\" (1.613 m)   Wt 98.4 kg (217 lb)   SpO2 97%   BMI 37.84 kg/m²   OB Status Postmenopausal   Smoking Status Never   BSA 2.01 m² "     Body mass index is 37.84 kg/m².  Body surface area is 2.01 meters squared.    GEN: Alert, awake oriented x3, in no acute distress  HEENT- No pallor, icterus, cyanosis, no oral mucosal lesions,   LAD - no palpable cervical, clavicle, axillary, inguinal LAD  Heart- normal S1 S2, regular rate and rhythm, No murmur, rubs.   Lungs- clear breathing sound bilateral.   Abdomen- soft, Non tender, bowel sounds present  Extremities- No cyanosis, clubbing, edema  Neuro- No focal neurological deficit  Skin -erythema in the left inframammary crease of the breast.    Labs:  Lab Results   Component Value Date    WBC 6.55 06/24/2024    HGB 13.7 06/24/2024    HCT 42.5 06/24/2024    MCV 91 06/24/2024     06/24/2024     Lab Results   Component Value Date     12/30/2016    SODIUM 140 06/24/2024    K 4.1 06/24/2024     06/24/2024    CO2 26 06/24/2024    ANIONGAP 11 07/22/2015    AGAP 9 06/24/2024    BUN 10 06/24/2024    CREATININE 0.79 06/24/2024    GLUC 103 06/24/2024    CALCIUM 9.7 06/24/2024    AST 27 06/24/2024    ALT 27 06/24/2024    ALKPHOS 87 06/24/2024    PROT 7.6 12/30/2016    TP 7.5 06/24/2024    BILITOT 0.8 12/30/2016    TBILI 0.58 06/24/2024    EGFR 80 06/24/2024

## 2024-11-21 ENCOUNTER — OFFICE VISIT (OUTPATIENT)
Facility: HOSPITAL | Age: 64
End: 2024-11-21
Attending: RADIOLOGY
Payer: COMMERCIAL

## 2024-11-21 VITALS
HEIGHT: 64 IN | SYSTOLIC BLOOD PRESSURE: 118 MMHG | DIASTOLIC BLOOD PRESSURE: 81 MMHG | BODY MASS INDEX: 37.83 KG/M2 | OXYGEN SATURATION: 94 % | WEIGHT: 221.6 LBS | HEART RATE: 88 BPM | TEMPERATURE: 96.7 F

## 2024-11-21 DIAGNOSIS — C50.512 MALIGNANT NEOPLASM OF LOWER-OUTER QUADRANT OF LEFT BREAST OF FEMALE, ESTROGEN RECEPTOR POSITIVE (HCC): Primary | ICD-10-CM

## 2024-11-21 DIAGNOSIS — Z17.0 MALIGNANT NEOPLASM OF LOWER-OUTER QUADRANT OF LEFT BREAST OF FEMALE, ESTROGEN RECEPTOR POSITIVE (HCC): Primary | ICD-10-CM

## 2024-11-21 PROCEDURE — 99024 POSTOP FOLLOW-UP VISIT: CPT | Performed by: RADIOLOGY

## 2024-11-21 PROCEDURE — 99211 OFF/OP EST MAY X REQ PHY/QHP: CPT | Performed by: RADIOLOGY

## 2024-11-21 NOTE — PROGRESS NOTES
Pascale Morgan 1960 is a 64 y.o. female with left breast cancer. She is s/p lumpectomy and SNLB 7/9/24. She completed radiation to the left breast 10/4/24. She is on anastrozole. She returns today for her first follow-up.        Upcoming:  3/5/25 Gilmar Felix NP  5/9/25 Wendy Pearce NP    Follow up visit     Oncology History Overview Note   7/9/2024 - left sided partial mastectomy with SLNBX for an invasive ductal carcinoma, grade 2, ER > 95% WV > 95%, Her2 2+ with negative FISH - pT1c(11mm)N1a(1/3)M0     Mammaprint low risk, luminal A     9/2024 RT    10/2024 - start anastrozole     Malignant neoplasm of lower-outer quadrant of left breast of female, estrogen receptor positive (HCC)   5/23/2024 Biopsy    Left breast ultrasound-guided biopsy  4 o'clock, 4 cm from nipple (IDRIS)  Invasive carcinoma of no special type (ductal)  Grade 2  ER >95; WV >95; HER2 2+, FISH negative  Lymphovascular invasion cannot be determined; focus suspicious for    Concordant. Malignancy appears unifocal; suspicious masses cover up to 7 mm on US. Left axilla US negative. Right breast clear.     5/23/2024 -  Cancer Staged    Staging form: Breast, AJCC 8th Edition  - Clinical stage from 5/23/2024: Stage IA (cT1b, cN0, cM0, G2, ER+, WV+, HER2-) - Signed by Alyssia Nails MD on 6/10/2024  Stage prefix: Initial diagnosis  Method of lymph node assessment: Clinical  Histologic grading system: 3 grade system       6/2/2024 Genetic Testing    NEGATIVE: No Clinically Significant Variants Detected  A total of 59 genes were evaluated with RNA insight: APC, ED, BAP1, BARD1, BMPR1A, BRCA1, BRCA2, BRIP1, CDH1, CDK4, CDKN1B, CDKN2A, CHEK2, DICER1, FH, FLCN, KIF1B, MAX, MEN1, MET, MLH1, MSH2, MSH6, MUTYH, NF1, NTHL1, PALB2, PMS2, POT1, PTEN, RAD51C, RAD51D, RB1, RET, SDHA, SDHAF2, SDHB, SDHC, SDHD, SMAD4, SMARCA4, STK11, WEEL932, TP53, TSC1, TSC2 and VHL (sequencing and deletion/duplication); AXIN2, CTNNA1, EGLN1, HOXB13, KIT, MITF, MSH3, PDGFRA,  POLD1 and POLE (sequencing only); EPCAM and GREM1 (deletion/duplication only).  [facility]     7/2/2024 Observation    Bilateral breast MRI done per patient's request.   IMPRESSION:   Biopsy-proven unifocal malignancy in the lower outer left breast; measures up to 1.2 cm.   No findings of malignancy in the right breast.     7/9/2024 Surgery    Left breast IDRIS localized lumpectomy with SLN biopsy  Invasive carcinoma of no special type (ductal)  Grade 2  1.1 cm  Lymphovascular invasion focally present  Margins negative  1/3 Lymph nodes with macrometastasis (6 mm, extranodal extension not identified)     7/9/2024 -  Cancer Staged    Staging form: Breast, AJCC 8th Edition  - Pathologic stage from 7/9/2024: Stage IA (pT1c, pN1a, cM0, G2, ER+, NJ+, HER2-) - Signed by Rosalie Soliman MD on 7/23/2024  Histopathologic type: Infiltrating duct carcinoma, NOS  Stage prefix: Initial diagnosis  Nuclear grade: G2  Multigene prognostic tests performed: None  Histologic grading system: 3 grade system  Laterality: Left  Lymph-vascular invasion (LVI): LVI present/identified, NOS       9/9/2024 - 10/4/2024 Radiation    Treatment:  Course: C1    Plan ID Energy Fractions Dose per Fraction (cGy) Dose Correction (cGy) Total Dose Delivered (cGy) Elapsed Days   BH L Boost 10X 4 / 4 250 0 1,000 3   BH L Sclav 10X 15 / 15 267 0 4,005 18   Lt Breast_BH 10X/6X 15 / 15 267 0 4,005 18      Treatment dates:  C1: 9/9/2024 - 10/4/2024         Review of Systems:  Review of Systems   Constitutional:  Positive for fatigue.   HENT: Negative.     Eyes: Negative.    Respiratory: Negative.     Cardiovascular: Negative.    Gastrointestinal: Negative.    Endocrine: Negative.    Genitourinary: Negative.    Musculoskeletal: Negative.    Skin: Negative.         Reports radiation reaction has mostly resolved   Allergic/Immunologic: Negative.    Neurological:  Positive for dizziness.   Hematological: Negative.    Psychiatric/Behavioral: Negative.         Clinical  Trial: no    Pregnancy test needed:  no      Health Maintenance   Topic Date Due   • Hepatitis C Screening  Never done   • Pneumococcal Vaccine: Pediatrics (0 to 5 Years) and At-Risk Patients (6 to 64 Years) (1 of 2 - PCV) Never done   • HIV Screening  Never done   • Zoster Vaccine (1 of 2) Never done   • DTaP,Tdap,and Td Vaccines (1 - Tdap) Never done   • RSV Vaccine Age 60+ Years (1 - Risk 60-74 years 1-dose series) Never done   • COVID-19 Vaccine (2 - Joi risk series) 09/26/2021   • BMI: Followup Plan  10/19/2023   • DM Eye Exam  04/07/2024   • HEMOGLOBIN A1C  08/09/2024   • Influenza Vaccine (1) Never done   • Annual Physical  01/31/2025   • Kidney Health Evaluation: Albumin/Creatinine Ratio  01/31/2025   • Diabetic Foot Exam  01/31/2025   • Breast Cancer Screening: Mammogram  05/23/2025   • Kidney Health Evaluation: GFR  06/24/2025   • BMI: Adult  11/06/2025   • Depression Screening  11/21/2025   • Cervical Cancer Screening  03/29/2029   • Colorectal Cancer Screening  03/13/2034   • Osteoporosis Screening  Completed   • RSV Vaccine age 0-20 Months  Aged Out   • HIB Vaccine  Aged Out   • IPV Vaccine  Aged Out   • Hepatitis A Vaccine  Aged Out   • Meningococcal ACWY Vaccine  Aged Out   • HPV Vaccine  Aged Out     Patient Active Problem List   Diagnosis   • Abnormal EKG   • Abnormal vaginal bleeding   • Acquired hypothyroidism   • Anxiety   • Chest pain, non-cardiac   • Hypercholesteremia   • Impaired fasting glucose   • Palpitations   • Pre-diabetes   • COVID-19 virus infection   • New onset type 2 diabetes mellitus (HCC)   • Obstructive sleep apnea syndrome   • Sleep related hypoxia   • Gas bloat syndrome   • Gastroesophageal reflux disease without esophagitis   • Rectal bleeding   • Irritable bowel syndrome   • Malignant neoplasm of lower-outer quadrant of left breast of female, estrogen receptor positive (HCC)     Past Medical History:   Diagnosis Date   • Anxiety    • Colon polyp    • COVID-19     in  march   • CPAP (continuous positive airway pressure) dependence    • Disease of thyroid gland    • Fatty liver ?   • GERD (gastroesophageal reflux disease) ?   • Hyperlipidemia    • Irritable bowel syndrome    • Kidney stone    • Sleep apnea      Past Surgical History:   Procedure Laterality Date   • BREAST BIOPSY Right     benign   • COLONOSCOPY  2016?   • FRACTURE SURGERY Right     arm   • VA BX/EXC LYMPH NODE OPEN SUPERFICIAL Left 7/9/2024    Procedure: BX LYMPH NODE SENTINEL;;  Surgeon: Alyssia Nails MD;  Location: [facility];  Service: Surgical Oncology   • VA EXC BREAST LES PREOP PLMT RAD MARKER OPEN 1 LES Left 7/9/2024    Procedure: LEFT BREAST IDRIS LOCALIZED LUMPECTOMY;  Surgeon: Alyssia Nails MD;  Location: [facility];  Service: Surgical Oncology   • VA INJ RADIOACTIVE TRACER FOR ID OF SENTINEL NODE Left 7/9/2024    Procedure: BX LYMPH NODE SENTINEL;;  Surgeon: Alyssia Nails MD;  Location: [facility];  Service: Surgical Oncology   • VA INTRAOP SENTINEL LYMPH NODE ID W/DYE INJECTION Left 7/9/2024    Procedure: BX LYMPH NODE SENTINEL;;  Surgeon: Alyssia Nails MD;  Location[facility];  Service: Surgical Oncology   • VA MASTECTOMY PARTIAL Left 7/9/2024    Procedure: LEFT BREAST IDRIS LOCALIZED LUMPECTOMY;  Surgeon: Alyssia Nails MD;  Location: [facility];  Service: Surgical Oncology   • UPPER GASTROINTESTINAL ENDOSCOPY  2021   • US GUIDED BREAST BIOPSY LEFT COMPLETE Left 5/23/2024   • WISDOM TOOTH EXTRACTION       Family History   Problem Relation Age of Onset   • Dementia Mother    • Heart failure Mother    • Diabetes Mother         My Mother passed from congestive heart failure   • Hypothyroidism Mother    • Pancreatic cancer Father    • Uterine cancer Sister 62   • Dementia Sister    • Hypothyroidism Sister    • Hypothyroidism Sister    • No Known Problems Sister    • No Known Problems Maternal Grandmother    • No Known Problems Maternal Grandfather    • No Known Problems Paternal Grandmother    • No Known  "Problems Paternal Grandfather    • Throat cancer Maternal Uncle      Social History     Socioeconomic History   • Marital status: /Civil Union     Spouse name: Gulshan \"Paras\"   • Number of children: 0   • Years of education: Not on file   • Highest education level: Not on file   Occupational History   • Occupation:    Tobacco Use   • Smoking status: Never   • Smokeless tobacco: Never   Vaping Use   • Vaping status: Never Used   Substance and Sexual Activity   • Alcohol use: Yes     Alcohol/week: 6.0 - 8.0 standard drinks of alcohol     Types: 2 - 4 Glasses of wine, 4 Standard drinks or equivalent per week     Comment: socially   • Drug use: No   • Sexual activity: Not Currently     Partners: Male     Birth control/protection: None   Other Topics Concern   • Not on file   Social History Narrative   • Not on file     Social Drivers of Health     Financial Resource Strain: Not on file   Food Insecurity: No Food Insecurity (3/13/2024)    Nursing - Inadequate Food Risk Classification    • Worried About Running Out of Food in the Last Year: Never true    • Ran Out of Food in the Last Year: Never true    • Ran Out of Food in the Last Year: Not on file   Transportation Needs: No Transportation Needs (3/13/2024)    PRAPARE - Transportation    • Lack of Transportation (Medical): No    • Lack of Transportation (Non-Medical): No   Physical Activity: Not on file   Stress: Not on file   Social Connections: Not on file   Intimate Partner Violence: Not on file   Housing Stability: Low Risk  (3/13/2024)    Housing Stability Vital Sign    • Unable to Pay for Housing in the Last Year: No    • Number of Times Moved in the Last Year: 1    • Homeless in the Last Year: No       Current Outpatient Medications:   •  anastrozole (ARIMIDEX) 1 mg tablet, Take 1 tablet (1 mg total) by mouth daily, Disp: 90 tablet, Rfl: 2  •  levothyroxine 100 mcg tablet, Take 1 tablet (100 mcg total) by mouth daily, Disp: 100 tablet, Rfl: 1  •  " "pantoprazole (PROTONIX) 40 mg tablet, Take 1 tablet (40 mg total) by mouth daily, Disp: 90 tablet, Rfl: 1  •  polyethylene glycol (GLYCOLAX) 17 GM/SCOOP powder, Take 17 g by mouth daily (Patient not taking: Reported on 6/10/2024), Disp: 255 g, Rfl: 0  Allergies   Allergen Reactions   • Sulfa Antibiotics Anaphylaxis   • Statins Hives     Vitals:    11/21/24 1546   BP: 118/81   Pulse: 88   Temp: (!) 96.7 °F (35.9 °C)   SpO2: 94%   Weight: 101 kg (221 lb 9.6 oz)   Height: 5' 3.5\" (1.613 m)      Pain Score: 0-No pain  "

## 2024-11-22 NOTE — PROGRESS NOTES
Follow-up Visit Name: Pascale Morgan      : 1960      MRN: [id number]  Encounter Provider: Rosalie Soliman MD  Encounter Date: 2024   Encounter department: [facility]       Cancer Staging   Malignant neoplasm of lower-outer quadrant of left breast of female, estrogen receptor positive (HCC)  Staging form: Breast, AJCC 8th Edition  - Clinical stage from 2024: Stage IA (cT1b, cN0, cM0, G2, ER+, ID+, HER2-) - Signed by Alyssia Nails MD on 6/10/2024  - Pathologic stage from 2024: Stage IA (pT1c, pN1a, cM0, G2, ER+, ID+, HER2-) - Signed by Rosalie Soliman MD on 2024  :  Assessment & Plan  Malignant neoplasm of lower-outer quadrant of left breast of female, estrogen receptor positive (HCC)      Ms. Morgan is a 64-year-old postmenopausal woman who self palpated a mass in the lower outer left breast, diagnostic imaging confirmed a suspicious mass and biopsy showed grade 2 invasive ductal carcinoma that is estrogen and progesterone receptor positive, HER2 negative.  She underwent a lumpectomy and sentinel node biopsy by Dr. Nails on 2024.  Final pathology shows a 1.1 cm grade 2 invasive ductal carcinoma with negative margins of excision, focal lymphovascular invasion and 1 of 3 sentinel nodes with a macrometastasis.  Pathologic stage pT1c pN1a cM0.  She completed left breast and regional radha radiation on 2024.  She started anastrozole shortly thereafter with good tolerance to date.  She she has no clinical evidence of recurrent disease on examination today.  She is recovering from acute effects of breast radiation.    She has follow-up with surgical oncology in March and with medical oncology in May 2025.  She is due for mammograms in 2025, these orders were placed today.    Orders:    Mammo diagnostic bilateral w 3d and cad; Future      Return to office in 9 month(s).    History of Present Illness   Chief Complaint   Patient  presents with    Follow-up     Pertinent Medical History       Ms. Morgan is a 64-year-old postmenopausal woman who self palpated a mass in the lower outer left breast in January 2024.  She had diagnostic mammogram on May 23, 2024 which confirmed a 7 mm mass in the lower outer left breast.  Biopsy showed grade 2 invasive ductal carcinoma that was strongly estrogen and progesterone receptor positive, HER2 negative.  She requested an MRI of the breast which confirmed a 1.2 cm enhancing mass in the area of known cancer with no other suspicious findings, including no lymphadenopathy seen.  She underwent a lumpectomy and sentinel node biopsy by Dr. Nails on July 9, 2024.  Pathology showed a 1.1 cm grade 2 invasive ductal carcinoma with negative margins of excision, focal lymphovascular invasion present and 1 of 3 sentinel nodes with a macrometastasis.  Pathologic stage pT1c pN1a cM0, prognostic stage IA.  MammaPrint was low risk luminal A, therefore she did not have adjuvant chemotherapy.  Genetic testing showed no pathogenic variant.  Metastatic workup including CT of the chest, abdomen and pelvis and bone scan showed no evidence of metastatic disease.  She completed radiation to the left breast and regional nodes to a dose of 4005 cGy in 15 fractions followed by a 1000 cGy boost to the left tumor bed, treated from September 9 through October 4, 2024.  She is seen for routine posttreatment follow-up visit today.    Patient states that at the completion of radiation she had some blistering and redness in the inframammary crease which has since resolved and she has some residual mild soreness in the breast with occasional brief shooting pains.  She denies any cough, shortness of breath or dyspnea.  She has no pain in the chest wall.  She denies any swelling in the upper extremities and has good range of motion.  She started on anastrozole with good tolerance, denying any new or persistent bone or joint pain.    Oncology  History   Oncology History Overview Note   7/9/2024 - left sided partial mastectomy with SLNBX for an invasive ductal carcinoma, grade 2, ER > 95% IN > 95%, Her2 2+ with negative FISH - pT1c(11mm)N1a(1/3)M0     Mammaprint low risk, luminal A     9/2024 RT    10/2024 - start anastrozole     Malignant neoplasm of lower-outer quadrant of left breast of female, estrogen receptor positive (HCC)   5/23/2024 Biopsy    Left breast ultrasound-guided biopsy  4 o'clock, 4 cm from nipple (IDRIS)  Invasive carcinoma of no special type (ductal)  Grade 2  ER >95; IN >95; HER2 2+, FISH negative  Lymphovascular invasion cannot be determined; focus suspicious for    Concordant. Malignancy appears unifocal; suspicious masses cover up to 7 mm on US. Left axilla US negative. Right breast clear.     5/23/2024 -  Cancer Staged    Staging form: Breast, AJCC 8th Edition  - Clinical stage from 5/23/2024: Stage IA (cT1b, cN0, cM0, G2, ER+, IN+, HER2-) - Signed by Alyssia Nails MD on 6/10/2024  Stage prefix: Initial diagnosis  Method of lymph node assessment: Clinical  Histologic grading system: 3 grade system       6/2/2024 Genetic Testing    NEGATIVE: No Clinically Significant Variants Detected  A total of 59 genes were evaluated with RNA insight: APC, ED, BAP1, BARD1, BMPR1A, BRCA1, BRCA2, BRIP1, CDH1, CDK4, CDKN1B, CDKN2A, CHEK2, DICER1, FH, FLCN, KIF1B, MAX, MEN1, MET, MLH1, MSH2, MSH6, MUTYH, NF1, NTHL1, PALB2, PMS2, POT1, PTEN, RAD51C, RAD51D, RB1, RET, SDHA, SDHAF2, SDHB, SDHC, SDHD, SMAD4, SMARCA4, STK11, CGWK037, TP53, TSC1, TSC2 and VHL (sequencing and deletion/duplication); AXIN2, CTNNA1, EGLN1, HOXB13, KIT, MITF, MSH3, PDGFRA, POLD1 and POLE (sequencing only); EPCAM and GREM1 (deletion/duplication only).  Ambry     7/2/2024 Observation    Bilateral breast MRI done per patient's request.   IMPRESSION:   Biopsy-proven unifocal malignancy in the lower outer left breast; measures up to 1.2 cm.   No findings of malignancy in the right  "breast.     7/9/2024 Surgery    Left breast IDRIS localized lumpectomy with SLN biopsy  Invasive carcinoma of no special type (ductal)  Grade 2  1.1 cm  Lymphovascular invasion focally present  Margins negative  1/3 Lymph nodes with macrometastasis (6 mm, extranodal extension not identified)     7/9/2024 -  Cancer Staged    Staging form: Breast, AJCC 8th Edition  - Pathologic stage from 7/9/2024: Stage IA (pT1c, pN1a, cM0, G2, ER+, AK+, HER2-) - Signed by Rosalie Soliman MD on 7/23/2024  Histopathologic type: Infiltrating duct carcinoma, NOS  Stage prefix: Initial diagnosis  Nuclear grade: G2  Multigene prognostic tests performed: None  Histologic grading system: 3 grade system  Laterality: Left  Lymph-vascular invasion (LVI): LVI present/identified, NOS       9/9/2024 - 10/4/2024 Radiation    Treatment:  Course: C1    Plan ID Energy Fractions Dose per Fraction (cGy) Dose Correction (cGy) Total Dose Delivered (cGy) Elapsed Days   BH L Boost 10X 4 / 4 250 0 1,000 3   BH L Sclav 10X 15 / 15 267 0 4,005 18   Lt Breast_BH 10X/6X 15 / 15 267 0 4,005 18      Treatment dates:  C1: 9/9/2024 - 10/4/2024        Review of Systems Refer to nursing note.        Objective   /81   Pulse 88   Temp (!) 96.7 °F (35.9 °C)   Ht 5' 3.5\" (1.613 m)   Wt 101 kg (221 lb 9.6 oz)   SpO2 94%   BMI 38.64 kg/m²     Pain Screening:  Pain Score: 0-No pain  ECOG 0   Physical Exam  Vitals and nursing note reviewed.   Constitutional:       General: She is not in acute distress.     Appearance: Normal appearance.   HENT:      Nose: No congestion.   Eyes:      General: No scleral icterus.     Extraocular Movements: Extraocular movements intact.      Pupils: Pupils are equal, round, and reactive to light.   Pulmonary:      Effort: Pulmonary effort is normal. No respiratory distress.   Chest:   Breasts:     Right: Skin change present. No swelling, mass or tenderness.      Left: Normal.          Comments: Left breast: Well-healed lumpectomy " "and axillary incision, no palpable mass, mild central edema of the skin, band of rash with erythema in the inframammary crease, no dry or moist desquamation  Musculoskeletal:         General: No swelling. Normal range of motion.      Cervical back: Normal range of motion.   Lymphadenopathy:      Cervical: No cervical adenopathy.      Upper Body:      Right upper body: No supraclavicular or axillary adenopathy.      Left upper body: No supraclavicular or axillary adenopathy.   Skin:     General: Skin is warm and dry.   Neurological:      General: No focal deficit present.      Mental Status: She is alert and oriented to person, place, and time.   Psychiatric:         Mood and Affect: Mood normal.         Thought Content: Thought content normal.         Judgment: Judgment normal.          Administrative Statements   I have spent a total time of 30 minutes in caring for this patient on the day of the visit/encounter including Diagnostic results, Counseling / Coordination of care, Documenting in the medical record, Reviewing / ordering tests, medicine, procedures  , and Obtaining or reviewing history  . Topics discussed with the patient / family include symptom assessment and management.  Portions of the record may have been created with voice recognition software.  Occasional wrong word or \"sound a like\" substitutions may have occurred due to the inherent limitations of voice recognition software.  Read the chart carefully and recognize, using context, where substitutions have occurred.  "

## 2024-11-22 NOTE — ASSESSMENT & PLAN NOTE
Ms. Morgan is a 64-year-old postmenopausal woman who self palpated a mass in the lower outer left breast, diagnostic imaging confirmed a suspicious mass and biopsy showed grade 2 invasive ductal carcinoma that is estrogen and progesterone receptor positive, HER2 negative.  She underwent a lumpectomy and sentinel node biopsy by Dr. Nails on July 9, 2024.  Final pathology shows a 1.1 cm grade 2 invasive ductal carcinoma with negative margins of excision, focal lymphovascular invasion and 1 of 3 sentinel nodes with a macrometastasis.  Pathologic stage pT1c pN1a cM0.  She completed left breast and regional radha radiation on October 4, 2024.  She started anastrozole shortly thereafter with good tolerance to date.  She she has no clinical evidence of recurrent disease on examination today.  She is recovering from acute effects of breast radiation.    She has follow-up with surgical oncology in March and with medical oncology in May 2025.  She is due for mammograms in April 2025, these orders were placed today.    Orders:    Mammo diagnostic bilateral w 3d and cad; Future

## 2024-12-30 ENCOUNTER — PATIENT OUTREACH (OUTPATIENT)
Dept: CASE MANAGEMENT | Facility: OTHER | Age: 64
End: 2024-12-30

## 2024-12-30 DIAGNOSIS — C50.512 MALIGNANT NEOPLASM OF LOWER-OUTER QUADRANT OF LEFT BREAST OF FEMALE, ESTROGEN RECEPTOR POSITIVE (HCC): ICD-10-CM

## 2024-12-30 DIAGNOSIS — Z17.0 MALIGNANT NEOPLASM OF LOWER-OUTER QUADRANT OF LEFT BREAST OF FEMALE, ESTROGEN RECEPTOR POSITIVE (HCC): ICD-10-CM

## 2024-12-30 RX ORDER — ANASTROZOLE 1 MG/1
1 TABLET ORAL DAILY
Qty: 90 TABLET | Refills: 2 | Status: SHIPPED | OUTPATIENT
Start: 2024-12-30

## 2024-12-30 NOTE — TELEPHONE ENCOUNTER
Rec'd call from patient asking for a refill of her Anastrozole be sent to her mail order pharmacy. I advised her that we will send this over today. Patient verbalized understanding.

## 2024-12-30 NOTE — PROGRESS NOTES
[facility] has not received outreach from patient since last contact. [facility] will close at this time. Should a need arise, please feel free to re-refer.

## 2025-02-24 ENCOUNTER — TELEPHONE (OUTPATIENT)
Age: 65
End: 2025-02-24

## 2025-02-24 NOTE — TELEPHONE ENCOUNTER
Spoke to patient. She reports that she has a mole on the side of her treated breast. She completed RT in October. She states that mole is getting darker and almost black. Did reassure that Radiation can cause darker skin/hyperpigmentation in treatment area. She reports feeling nervous as she had read Radiation can cause melanoma. She is seeing Dermatology later today.  Review same with Dr. Soliman and aware that she is awaiting dermatology evaluation.

## 2025-02-24 NOTE — TELEPHONE ENCOUNTER
Rec'd call from patient asking if her previous radiation therapy could cause melanoma because she currently has a mole that looks abnormal after radiation. Patient is scheduled at [facility] today at 2:45pm. I advised her that it is good she is seeing a dermatologist, but I do not specialize in radiation oncology, so I will have to defer her question to them. I advised her that one of their clinical team members will call her shortly to review. Patient verbalized understanding and is in agreement with the plan.

## 2025-03-05 ENCOUNTER — OFFICE VISIT (OUTPATIENT)
Dept: SURGICAL ONCOLOGY | Facility: CLINIC | Age: 65
End: 2025-03-05
Payer: COMMERCIAL

## 2025-03-05 VITALS
DIASTOLIC BLOOD PRESSURE: 82 MMHG | HEART RATE: 76 BPM | HEIGHT: 64 IN | BODY MASS INDEX: 38.24 KG/M2 | OXYGEN SATURATION: 93 % | WEIGHT: 224 LBS | SYSTOLIC BLOOD PRESSURE: 122 MMHG | TEMPERATURE: 98 F

## 2025-03-05 DIAGNOSIS — Z17.0 MALIGNANT NEOPLASM OF LOWER-OUTER QUADRANT OF LEFT BREAST OF FEMALE, ESTROGEN RECEPTOR POSITIVE (HCC): Primary | ICD-10-CM

## 2025-03-05 DIAGNOSIS — Z79.811 AROMATASE INHIBITOR USE: ICD-10-CM

## 2025-03-05 DIAGNOSIS — C50.512 MALIGNANT NEOPLASM OF LOWER-OUTER QUADRANT OF LEFT BREAST OF FEMALE, ESTROGEN RECEPTOR POSITIVE (HCC): Primary | ICD-10-CM

## 2025-03-05 PROCEDURE — 99215 OFFICE O/P EST HI 40 MIN: CPT | Performed by: NURSE PRACTITIONER

## 2025-03-05 NOTE — ASSESSMENT & PLAN NOTE
Patient is a 64-year-old female that was diagnosed with a left breast cancer in May 2024.  Her pathology revealed invasive ductal carcinoma, ER/MS 95%, HER2 FISH negative.  She underwent genetic testing which was negative.  She underwent a left lumpectomy and sentinel node biopsy with Dr. Nails.  She had 1/3 positive lymph nodes.  MammaPrint testing was low risk.  She completed adjuvant radiation therapy and is currently maintained on anastrozole.  Breast cancer survivorship visit performed today and treatment summary and care plan were reviewed with patient.  She is scheduled for a mammogram in May.  Overall patient is doing very well.  There are no worrisome findings on her clinical exam today.  She has very mild breast edema of the left breast for which I encouraged massage and wearing a supportive bra.  Patient admits that she does not routinely wear a bra.  She reports some slight range of motion deficits of her left shoulder but has been performing shoulder exercises on her own.  She will contact me if she is interested in a referral to physical therapy.  She is up-to-date on cervical and colorectal cancer screenings as well as osteoporosis screening.  I did encourage her to take supplemental calcium and vitamin D given her osteopenia diagnosis.  She has upcoming appointments with medical oncology and radiation oncology.  We will see her back in November for a routine follow-up or sooner should the need arise.  She was instructed to contact us with any changes or concerns in the interim.  All of her questions were answered today.

## 2025-03-05 NOTE — PROGRESS NOTES
Name: Pascale Morgan      : 1960      MRN: [id number]  Encounter Provider: BRIDGER Neal  Encounter Date: 3/5/2025   Encounter department: [facility] [place]  :  Assessment & Plan  Malignant neoplasm of lower-outer quadrant of left breast of female, estrogen receptor positive (HCC)       Patient is a 64-year-old female that was diagnosed with a left breast cancer in May 2024.  Her pathology revealed invasive ductal carcinoma, ER/MO 95%, HER2 FISH negative.  She underwent genetic testing which was negative.  She underwent a left lumpectomy and sentinel node biopsy with Dr. Nails.  She had 1/3 positive lymph nodes.  MammaPrint testing was low risk.  She completed adjuvant radiation therapy and is currently maintained on anastrozole.  Breast cancer survivorship visit performed today and treatment summary and care plan were reviewed with patient.  She is scheduled for a mammogram in May.  Overall patient is doing very well.  There are no worrisome findings on her clinical exam today.  She has very mild breast edema of the left breast for which I encouraged massage and wearing a supportive bra.  Patient admits that she does not routinely wear a bra.  She reports some slight range of motion deficits of her left shoulder but has been performing shoulder exercises on her own.  She will contact me if she is interested in a referral to physical therapy.  She is up-to-date on cervical and colorectal cancer screenings as well as osteoporosis screening.  I did encourage her to take supplemental calcium and vitamin D given her osteopenia diagnosis.  She has upcoming appointments with medical oncology and radiation oncology.  We will see her back in November for a routine follow-up or sooner should the need arise.  She was instructed to contact us with any changes or concerns in the interim.  All of her questions were answered today.  Aromatase inhibitor use      "        Survivorship  Discussed symptoms related to disease recurrence, Yes     Evaluated for late effects related to cancer treatment, Yes     Screening current for cervical cancer, Yes pap 3/29/24     Screening current for colon cancer, Yes 3/13/2024, repeat in 7 years     Cancer rehabilitation services addressed, Yes     Screening current for osteoporosis, Yes dxa 8/1/2024     Oncology Treatment Summary reviewed with patient and copy provided, Yes     Referral placed for psychosocial evaluation/screening to oncology social work  Yes      Side effects   Surgery- n/a   Radiation- some slight discomfort with ROM left shoulder, doing exercises   Medical Treatment- weight gain 10 lbs, arthralgias    Support Groups- has not participated to date    Family/Friends- \"doing good\" states  has struggled some, but seems to be okay now    Healthy Living- doing shoulder exercises on own, will consider [employer]    Genetics- UTD      History of Present Illness   Pascale Morgan is a 64 y.o. year old female who presents today for a survivorship visit. She completed adjuvant radiation therapy and is taking Anastrozole. She reports gaining approximately 10 lbs and also notes some arthralgias. She states he  had a cough and then she developed a cough and some chest tightness in January. Reports cough seems to be improving now. Denies persistent headaches, back pain, bone pain, SOB, abdominal pain.      Oncology History   Cancer Staging   Malignant neoplasm of lower-outer quadrant of left breast of female, estrogen receptor positive (HCC)  Staging form: Breast, AJCC 8th Edition  - Clinical stage from 5/23/2024: Stage IA (cT1b, cN0, cM0, G2, ER+, WA+, HER2-) - Signed by Alyssia Nails MD on 6/10/2024  Stage prefix: Initial diagnosis  Method of lymph node assessment: Clinical  Histologic grading system: 3 grade system  - Pathologic stage from 7/9/2024: Stage IA (pT1c, pN1a, cM0, G2, ER+, WA+, HER2-) - Signed by Rosalie CONCEPCION" MD Jourdan on 7/23/2024  Histopathologic type: Infiltrating duct carcinoma, NOS  Stage prefix: Initial diagnosis  Nuclear grade: G2  Multigene prognostic tests performed: None  Histologic grading system: 3 grade system  Laterality: Left  Lymph-vascular invasion (LVI): LVI present/identified, NOS  Oncology History Overview Note   7/9/2024 - left sided partial mastectomy with SLNBX for an invasive ductal carcinoma, grade 2, ER > 95% MI > 95%, Her2 2+ with negative FISH - pT1c(11mm)N1a(1/3)M0     Mammaprint low risk, luminal A     9/2024 RT    10/2024 - start anastrozole     Malignant neoplasm of lower-outer quadrant of left breast of female, estrogen receptor positive (HCC)   5/23/2024 Biopsy    Left breast ultrasound-guided biopsy  4 o'clock, 4 cm from nipple   Invasive ductal carcinoma  Grade 2  ER >95%; MI >95%; HER2 2+, FISH negative     5/23/2024 -  Cancer Staged    Staging form: Breast, AJCC 8th Edition  - Clinical stage from 5/23/2024: Stage IA (cT1b, cN0, cM0, G2, ER+, MI+, HER2-) - Signed by Alyssia Nails MD on 6/10/2024  Stage prefix: Initial diagnosis  Method of lymph node assessment: Clinical  Histologic grading system: 3 grade system       6/2/2024 Genetic Testing    NEGATIVE: No Clinically Significant Variants Detected  A total of 59 genes were evaluated with RNA insight: APC, ED, BAP1, BARD1, BMPR1A, BRCA1, BRCA2, BRIP1, CDH1, CDK4, CDKN1B, CDKN2A, CHEK2, DICER1, FH, FLCN, KIF1B, MAX, MEN1, MET, MLH1, MSH2, MSH6, MUTYH, NF1, NTHL1, PALB2, PMS2, POT1, PTEN, RAD51C, RAD51D, RB1, RET, SDHA, SDHAF2, SDHB, SDHC, SDHD, SMAD4, SMARCA4, STK11, NYUP586, TP53, TSC1, TSC2 and VHL (sequencing and deletion/duplication); AXIN2, CTNNA1, EGLN1, HOXB13, KIT, MITF, MSH3, PDGFRA, POLD1 and POLE (sequencing only); EPCAM and GREM1 (deletion/duplication only).  Ambry     7/9/2024 Surgery    Left breast lumpectomy with sentinel lymph node biopsy  Invasive ductal carcinoma  Grade 2  1.1 cm  Lymphovascular invasion focally  present  Margins negative  1/3 Lymph nodes with macrometastasis (6 mm, extranodal extension not identified)    Dr. Nails     7/9/2024 -  Cancer Staged    Staging form: Breast, AJCC 8th Edition  - Pathologic stage from 7/9/2024: Stage IA (pT1c, pN1a, cM0, G2, ER+, SC+, HER2-) - Signed by Rosalie Soliman MD on 7/23/2024  Histopathologic type: Infiltrating duct carcinoma, NOS  Stage prefix: Initial diagnosis  Nuclear grade: G2  Multigene prognostic tests performed: None  Histologic grading system: 3 grade system  Laterality: Left  Lymph-vascular invasion (LVI): LVI present/identified, NOS        Genomic Testing    Mammaprint low risk, luminal A      9/9/2024 - 10/4/2024 Radiation    Treatment:  Course: C1    Plan ID Energy Fractions Dose per Fraction (cGy) Dose Correction (cGy) Total Dose Delivered (cGy) Elapsed Days   BH L Boost 10X 4 / 4 250 0 1,000 3   BH L Sclav 10X 15 / 15 267 0 4,005 18   Lt Breast_BH 10X/6X 15 / 15 267 0 4,005 18      Dr. Soliman     10/2024 -  Hormone Therapy    Anastrozole  Dr. Carrero        Review of Systems   Constitutional:  Negative for activity change, appetite change, chills, fatigue, fever and unexpected weight change.   Respiratory:  Positive for cough (improving). Negative for shortness of breath.    Cardiovascular:  Negative for chest pain.   Gastrointestinal:  Negative for abdominal pain, constipation, diarrhea, nausea and vomiting.   Musculoskeletal:  Negative for arthralgias, back pain, gait problem and myalgias.   Skin:  Negative for color change and rash.   Neurological:  Negative for dizziness and headaches.   Hematological:  Negative for adenopathy.   Psychiatric/Behavioral:  Negative for agitation and confusion.    All other systems reviewed and are negative.   A complete review of systems is negative other than that noted above in the HPI.           Objective   /82 (BP Location: Right leg, Patient Position: Sitting, Cuff Size: Large)   Pulse 76   Temp 98 °F (36.7  "°C) (Temporal)   Ht 5' 3.5\" (1.613 m)   Wt 102 kg (224 lb)   SpO2 93%   BMI 39.06 kg/m²     Pain Screening:  Pain Score: 0-No pain (discomfort under L armpit and breast)  ECOG    Physical Exam  Vitals reviewed.   Constitutional:       General: She is not in acute distress.     Appearance: Normal appearance. She is well-developed. She is not diaphoretic.   HENT:      Head: Normocephalic and atraumatic.   Cardiovascular:      Rate and Rhythm: Normal rate and regular rhythm.      Heart sounds: Normal heart sounds.   Pulmonary:      Effort: Pulmonary effort is normal.      Breath sounds: Normal breath sounds.   Chest:   Breasts:     Right: No swelling, bleeding, inverted nipple, mass, nipple discharge, skin change or tenderness.      Left: Swelling (mild breast edema, inferior aspect of breast) and skin change (surgical scars) present. No bleeding, inverted nipple, mass, nipple discharge or tenderness.   Abdominal:      Palpations: Abdomen is soft. There is no mass.      Tenderness: There is no abdominal tenderness.   Musculoskeletal:         General: Normal range of motion.      Cervical back: Normal range of motion.   Lymphadenopathy:      Upper Body:      Right upper body: No supraclavicular or axillary adenopathy.      Left upper body: No supraclavicular or axillary adenopathy.   Skin:     General: Skin is warm and dry.      Findings: No rash.   Neurological:      Mental Status: She is alert and oriented to person, place, and time.   Psychiatric:         Speech: Speech normal.          Administrative Statements   I have spent a total time of 45 minutes in caring for this patient on the day of the visit/encounter including Prognosis, Risks and benefits of tx options, Instructions for management, Patient and family education, Importance of tx compliance, Risk factor reductions, Impressions, Counseling / Coordination of care, Documenting in the medical record, Reviewing/placing orders in the medical record " (including tests, medications, and/or procedures), Obtaining or reviewing history  , and Communicating with other healthcare professionals .

## 2025-04-22 DIAGNOSIS — C50.512 MALIGNANT NEOPLASM OF LOWER-OUTER QUADRANT OF LEFT BREAST OF FEMALE, ESTROGEN RECEPTOR POSITIVE (HCC): ICD-10-CM

## 2025-04-22 DIAGNOSIS — Z17.0 MALIGNANT NEOPLASM OF LOWER-OUTER QUADRANT OF LEFT BREAST OF FEMALE, ESTROGEN RECEPTOR POSITIVE (HCC): ICD-10-CM

## 2025-04-22 RX ORDER — ANASTROZOLE 1 MG/1
1 TABLET ORAL DAILY
Qty: 90 TABLET | Refills: 1 | Status: CANCELLED | OUTPATIENT
Start: 2025-04-22

## 2025-04-22 NOTE — TELEPHONE ENCOUNTER
Patient called requesting refill for anastrozole. Patient made aware medication was refilled on 12/30/24 for 90 with 2 refills to [facility] pharmacy. Patient instructed to contact the pharmacy to obtain refills of medication. Patient verbalized understanding.

## 2025-04-23 DIAGNOSIS — C50.512 MALIGNANT NEOPLASM OF LOWER-OUTER QUADRANT OF LEFT BREAST OF FEMALE, ESTROGEN RECEPTOR POSITIVE (HCC): ICD-10-CM

## 2025-04-23 DIAGNOSIS — Z17.0 MALIGNANT NEOPLASM OF LOWER-OUTER QUADRANT OF LEFT BREAST OF FEMALE, ESTROGEN RECEPTOR POSITIVE (HCC): ICD-10-CM

## 2025-04-23 RX ORDER — ANASTROZOLE 1 MG/1
1 TABLET ORAL DAILY
Qty: 90 TABLET | Refills: 3 | Status: SHIPPED | OUTPATIENT
Start: 2025-04-23

## 2025-04-23 RX ORDER — ANASTROZOLE 1 MG/1
1 TABLET ORAL DAILY
Qty: 30 TABLET | Refills: 0 | Status: SHIPPED | OUTPATIENT
Start: 2025-04-23 | End: 2025-04-23 | Stop reason: SDUPTHER

## 2025-04-23 NOTE — TELEPHONE ENCOUNTER
Rec'd call from patient stating that she is out of Anastrozole and needs a 30 day refill sent into her [facility] pharmacy and then her original 90 day supply be sent to her [facility] pharmacy mail order. I called back and left VM to let her know that this will be completed today. Callback number provided.

## 2025-05-02 ENCOUNTER — HOSPITAL ENCOUNTER (OUTPATIENT)
Dept: MAMMOGRAPHY | Facility: CLINIC | Age: 65
Discharge: HOME | End: 2025-05-02
Payer: COMMERCIAL

## 2025-05-02 VITALS — BODY MASS INDEX: 38.24 KG/M2 | HEIGHT: 64 IN | WEIGHT: 224 LBS

## 2025-05-02 DIAGNOSIS — C50.512 MALIGNANT NEOPLASM OF LOWER-OUTER QUADRANT OF LEFT BREAST OF FEMALE, ESTROGEN RECEPTOR POSITIVE (HCC): ICD-10-CM

## 2025-05-02 DIAGNOSIS — Z17.0 MALIGNANT NEOPLASM OF LOWER-OUTER QUADRANT OF LEFT BREAST OF FEMALE, ESTROGEN RECEPTOR POSITIVE (HCC): ICD-10-CM

## 2025-05-02 PROCEDURE — 77066 DX MAMMO INCL CAD BI: CPT

## 2025-05-02 PROCEDURE — G0279 TOMOSYNTHESIS, MAMMO: HCPCS

## 2025-05-08 ENCOUNTER — APPOINTMENT (OUTPATIENT)
Dept: LAB | Facility: HOSPITAL | Age: 65
End: 2025-05-08
Payer: COMMERCIAL

## 2025-05-08 ENCOUNTER — TELEPHONE (OUTPATIENT)
Age: 65
End: 2025-05-08

## 2025-05-08 DIAGNOSIS — E11.9 TYPE 2 DIABETES MELLITUS WITHOUT COMPLICATION, WITHOUT LONG-TERM CURRENT USE OF INSULIN (HCC): ICD-10-CM

## 2025-05-08 DIAGNOSIS — E03.9 HYPOTHYROIDISM, UNSPECIFIED TYPE: ICD-10-CM

## 2025-05-08 DIAGNOSIS — E11.9 NEW ONSET TYPE 2 DIABETES MELLITUS (HCC): Primary | ICD-10-CM

## 2025-05-08 DIAGNOSIS — E78.2 MIXED HYPERLIPIDEMIA: ICD-10-CM

## 2025-05-08 DIAGNOSIS — C50.512 MALIGNANT NEOPLASM OF LOWER-OUTER QUADRANT OF LEFT BREAST OF FEMALE, ESTROGEN RECEPTOR POSITIVE (HCC): ICD-10-CM

## 2025-05-08 DIAGNOSIS — Z17.0 MALIGNANT NEOPLASM OF LOWER-OUTER QUADRANT OF LEFT BREAST OF FEMALE, ESTROGEN RECEPTOR POSITIVE (HCC): ICD-10-CM

## 2025-05-08 LAB
ALBUMIN SERPL BCG-MCNC: 4.3 G/DL (ref 3.5–5)
ALP SERPL-CCNC: 96 U/L (ref 34–104)
ALT SERPL W P-5'-P-CCNC: 48 U/L (ref 7–52)
ANION GAP SERPL CALCULATED.3IONS-SCNC: 8 MMOL/L (ref 4–13)
AST SERPL W P-5'-P-CCNC: 58 U/L (ref 13–39)
BASOPHILS # BLD AUTO: 0.07 THOUSANDS/ÂΜL (ref 0–0.1)
BASOPHILS NFR BLD AUTO: 2 % (ref 0–1)
BILIRUB SERPL-MCNC: 0.61 MG/DL (ref 0.2–1)
BUN SERPL-MCNC: 11 MG/DL (ref 5–25)
CALCIUM SERPL-MCNC: 9.7 MG/DL (ref 8.4–10.2)
CHLORIDE SERPL-SCNC: 103 MMOL/L (ref 96–108)
CO2 SERPL-SCNC: 27 MMOL/L (ref 21–32)
CREAT SERPL-MCNC: 0.67 MG/DL (ref 0.6–1.3)
EOSINOPHIL # BLD AUTO: 0.26 THOUSAND/ÂΜL (ref 0–0.61)
EOSINOPHIL NFR BLD AUTO: 5 % (ref 0–6)
ERYTHROCYTE [DISTWIDTH] IN BLOOD BY AUTOMATED COUNT: 14 % (ref 11.6–15.1)
EST. AVERAGE GLUCOSE BLD GHB EST-MCNC: 163 MG/DL
GFR SERPL CREATININE-BSD FRML MDRD: 93 ML/MIN/1.73SQ M
GLUCOSE SERPL-MCNC: 123 MG/DL (ref 65–140)
HBA1C MFR BLD: 7.3 %
HCT VFR BLD AUTO: 42.5 % (ref 34.8–46.1)
HGB BLD-MCNC: 14.2 G/DL (ref 11.5–15.4)
IMM GRANULOCYTES # BLD AUTO: 0.04 THOUSAND/UL (ref 0–0.2)
IMM GRANULOCYTES NFR BLD AUTO: 1 % (ref 0–2)
LYMPHOCYTES # BLD AUTO: 1.52 THOUSANDS/ÂΜL (ref 0.6–4.47)
LYMPHOCYTES NFR BLD AUTO: 32 % (ref 14–44)
MCH RBC QN AUTO: 30.7 PG (ref 26.8–34.3)
MCHC RBC AUTO-ENTMCNC: 33.4 G/DL (ref 31.4–37.4)
MCV RBC AUTO: 92 FL (ref 82–98)
MONOCYTES # BLD AUTO: 0.56 THOUSAND/ÂΜL (ref 0.17–1.22)
MONOCYTES NFR BLD AUTO: 12 % (ref 4–12)
NEUTROPHILS # BLD AUTO: 2.33 THOUSANDS/ÂΜL (ref 1.85–7.62)
NEUTS SEG NFR BLD AUTO: 48 % (ref 43–75)
NRBC BLD AUTO-RTO: 0 /100 WBCS
PLATELET # BLD AUTO: 343 THOUSANDS/UL (ref 149–390)
PMV BLD AUTO: 9.6 FL (ref 8.9–12.7)
POTASSIUM SERPL-SCNC: 3.9 MMOL/L (ref 3.5–5.3)
PROT SERPL-MCNC: 7.5 G/DL (ref 6.4–8.4)
RBC # BLD AUTO: 4.62 MILLION/UL (ref 3.81–5.12)
SODIUM SERPL-SCNC: 138 MMOL/L (ref 135–147)
WBC # BLD AUTO: 4.78 THOUSAND/UL (ref 4.31–10.16)

## 2025-05-08 PROCEDURE — 36415 COLL VENOUS BLD VENIPUNCTURE: CPT

## 2025-05-08 PROCEDURE — 83036 HEMOGLOBIN GLYCOSYLATED A1C: CPT

## 2025-05-08 PROCEDURE — 80053 COMPREHEN METABOLIC PANEL: CPT

## 2025-05-08 PROCEDURE — 85025 COMPLETE CBC W/AUTO DIFF WBC: CPT

## 2025-05-08 RX ORDER — LEVOTHYROXINE SODIUM 100 UG/1
100 TABLET ORAL DAILY
Qty: 100 TABLET | Refills: 1 | Status: SHIPPED | OUTPATIENT
Start: 2025-05-08 | End: 2025-05-12 | Stop reason: SDUPTHER

## 2025-05-08 NOTE — TELEPHONE ENCOUNTER
Spoke to patient regarding having lab work completed for upcoming appointment. Patient stated she will be going to the [facility]'s lab today.

## 2025-05-08 NOTE — TELEPHONE ENCOUNTER
Spoke to pt and scheduled annual physical and ordered lab work for pt to get done prior to appt. Pt verbalized understanding. Pt states she will be out of thyroid med before annual physical would like refill and a call when it is placed.

## 2025-05-08 NOTE — TELEPHONE ENCOUNTER
Medication: Levothyroxine     Dose/Frequency: 100 mcg     Quantity: Patient is out of medication. She is asking for a short term 2 week supply sent to her local pharmacy ([facility]) and the 90 day supply to be sent to her mail order pharmacy. Please call patient once medication has been sent. Thank you!     Office:   [x] PCP/Provider -   [] Speciality/Provider -     Does the patient have enough for 3 days?   [] Yes   [x] No - Send as HP to POD (patient had incorrect dose from previous refill, but is all out of her 100 mcg tablets)

## 2025-05-09 ENCOUNTER — OFFICE VISIT (OUTPATIENT)
Age: 65
End: 2025-05-09
Payer: COMMERCIAL

## 2025-05-09 ENCOUNTER — DOCUMENTATION (OUTPATIENT)
Dept: OTHER | Facility: HOSPITAL | Age: 65
End: 2025-05-09

## 2025-05-09 VITALS
RESPIRATION RATE: 18 BRPM | WEIGHT: 224 LBS | HEART RATE: 89 BPM | TEMPERATURE: 98.1 F | OXYGEN SATURATION: 97 % | HEIGHT: 64 IN | BODY MASS INDEX: 38.24 KG/M2 | SYSTOLIC BLOOD PRESSURE: 128 MMHG | DIASTOLIC BLOOD PRESSURE: 84 MMHG

## 2025-05-09 DIAGNOSIS — C50.512 MALIGNANT NEOPLASM OF LOWER-OUTER QUADRANT OF LEFT BREAST OF FEMALE, ESTROGEN RECEPTOR POSITIVE (HCC): Primary | ICD-10-CM

## 2025-05-09 DIAGNOSIS — R10.84 GENERALIZED ABDOMINAL PAIN: ICD-10-CM

## 2025-05-09 DIAGNOSIS — Z17.0 MALIGNANT NEOPLASM OF LOWER-OUTER QUADRANT OF LEFT BREAST OF FEMALE, ESTROGEN RECEPTOR POSITIVE (HCC): Primary | ICD-10-CM

## 2025-05-09 PROCEDURE — 99214 OFFICE O/P EST MOD 30 MIN: CPT | Performed by: NURSE PRACTITIONER

## 2025-05-09 RX ORDER — PANTOPRAZOLE SODIUM 40 MG/1
40 TABLET, DELAYED RELEASE ORAL DAILY
Qty: 90 TABLET | Refills: 1 | Status: SHIPPED | OUTPATIENT
Start: 2025-05-09

## 2025-05-09 NOTE — PROGRESS NOTES
[facility]  Documentation of Informed Consent      I, Nola Quiros, Clinical Research Coordinator, met with Pascale Morgan on Date: 5/9/25  at Time: 9:45 am to conduct the informed consent process for enrollment into Research Study: [facility]LEX Registry . Nola Quiros and Pascale Morgan were present during the consent discussion and the signing of the consent form.   The current IRB approved informed consent form was reviewed in its entirety. Pascale Morgan was given sufficient time to review the study information and to ask questions, and all questions were answered to the satisfaction of Pascale Morgan. A copy of the signed informed consent form was provided to Pascale Morgan. The informed consent document was signed before any research procedures were performed. Pt also consented to all three optional questions. Mammaprint ID#[id number].

## 2025-05-09 NOTE — PROGRESS NOTES
Name: Pascale Morgan      : 1960      MRN: [id number]  Encounter Provider: BRIDGER Erazo  Encounter Date: 2025   Encounter department: 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  She underwent lumpectomy and sentinel lymph node biopsy on 2024.  Final pathology stage pT1c pN1a cM0.  She completed a course of left breast and regional radha radiation on 10/4/2024  She is on adjuvant hormonal therapy with anastrozole    25 Diagnostic mammogram was negative for any evidence of malignancy.  She will be due for DEXA scan in 2026  Blood work in normal range, clinically no concerns on exam today    She will continue on anastrozole without change.  She will return for follow-up visit with medical oncology in 6 months with repeat blood work.  She is aware to call anytime with questions or concerns at any time    Orders:    CBC and differential; Future    Comprehensive metabolic panel; Future        Return in about 6 months (around 2025) for Jessie, bloodwork.    History of Present Illness   Chief Complaint   Patient presents with    Follow-up     Oncology History   Cancer Staging   Malignant neoplasm of lower-outer quadrant of left breast of female, estrogen receptor positive (HCC)  Staging form: Breast, AJCC 8th Edition  - Clinical stage from 2024: Stage IA (cT1b, cN0, cM0, G2, ER+, MA+, HER2-) - Signed by Alyssia Nails MD on 6/10/2024  Stage prefix: Initial diagnosis  Method of lymph node assessment: Clinical  Histologic grading system: 3 grade system  - Pathologic stage from 2024: Stage IA (pT1c, pN1a, cM0, G2, ER+, MA+, HER2-) - Signed by Rosalie Soliman MD on 2024  Histopathologic type:  Infiltrating duct carcinoma, NOS  Stage prefix: Initial diagnosis  Nuclear grade: G2  Multigene prognostic tests performed: None  Histologic grading system: 3 grade system  Laterality: Left  Lymph-vascular invasion (LVI): LVI present/identified, NOS  Oncology History Overview Note   7/9/2024 - left sided partial mastectomy with SLNBX for an invasive ductal carcinoma, grade 2, ER > 95% OK > 95%, Her2 2+ with negative FISH - pT1c(11mm)N1a(1/3)M0     Mammaprint low risk, luminal A     9/2024 RT    10/2024 - start anastrozole     Malignant neoplasm of lower-outer quadrant of left breast of female, estrogen receptor positive (HCC)   5/23/2024 Biopsy    Left breast ultrasound-guided biopsy  4 o'clock, 4 cm from nipple   Invasive ductal carcinoma  Grade 2  ER >95%; OK >95%; HER2 2+, FISH negative     5/23/2024 -  Cancer Staged    Staging form: Breast, AJCC 8th Edition  - Clinical stage from 5/23/2024: Stage IA (cT1b, cN0, cM0, G2, ER+, OK+, HER2-) - Signed by Alyssia Nails MD on 6/10/2024  Stage prefix: Initial diagnosis  Method of lymph node assessment: Clinical  Histologic grading system: 3 grade system       6/2/2024 Genetic Testing    NEGATIVE: No Clinically Significant Variants Detected  A total of 59 genes were evaluated with RNA insight: APC, ED, BAP1, BARD1, BMPR1A, BRCA1, BRCA2, BRIP1, CDH1, CDK4, CDKN1B, CDKN2A, CHEK2, DICER1, FH, FLCN, KIF1B, MAX, MEN1, MET, MLH1, MSH2, MSH6, MUTYH, NF1, NTHL1, PALB2, PMS2, POT1, PTEN, RAD51C, RAD51D, RB1, RET, SDHA, SDHAF2, SDHB, SDHC, SDHD, SMAD4, SMARCA4, STK11, ROAV741, TP53, TSC1, TSC2 and VHL (sequencing and deletion/duplication); AXIN2, CTNNA1, EGLN1, HOXB13, KIT, MITF, MSH3, PDGFRA, POLD1 and POLE (sequencing only); EPCAM and GREM1 (deletion/duplication only).  Chelsi     7/9/2024 Surgery    Left breast lumpectomy with sentinel lymph node biopsy  Invasive ductal carcinoma  Grade 2  1.1 cm  Lymphovascular invasion focally present  Margins negative  1/3 Lymph nodes with  "macrometastasis (6 mm, extranodal extension not identified)    Dr. Nails     7/9/2024 -  Cancer Staged    Staging form: Breast, AJCC 8th Edition  - Pathologic stage from 7/9/2024: Stage IA (pT1c, pN1a, cM0, G2, ER+, IN+, HER2-) - Signed by Rosalie Soliman MD on 7/23/2024  Histopathologic type: Infiltrating duct carcinoma, NOS  Stage prefix: Initial diagnosis  Nuclear grade: G2  Multigene prognostic tests performed: None  Histologic grading system: 3 grade system  Laterality: Left  Lymph-vascular invasion (LVI): LVI present/identified, NOS        Genomic Testing    Mammaprint low risk, luminal A      9/9/2024 - 10/4/2024 Radiation    Treatment:  Course: C1    Plan ID Energy Fractions Dose per Fraction (cGy) Dose Correction (cGy) Total Dose Delivered (cGy) Elapsed Days   BH L Boost 10X 4 / 4 250 0 1,000 3   BH L Sclav 10X 15 / 15 267 0 4,005 18   Lt Breast_BH 10X/6X 15 / 15 267 0 4,005 18      Dr. Soliman     10/2024 -  Hormone Therapy    Anastrozole  Dr. Carrero        Pertinent Medical History     05/09/25: Pascale presents for 6-month follow-up for history of ER positive left breast cancer.  She is on anastrozole daily and tolerating well.  Recent mammogram is benign.  Clinical breast exam without any concern today.  She met with her clinical trial team due to her low MammaPrint to discuss participation in trial     Review of Systems   All other systems reviewed and are negative.    Medical History Reviewed by provider this encounter:     .      Objective   /84 (BP Location: Right arm, Patient Position: Sitting, Cuff Size: Adult)   Pulse 89   Temp 98.1 °F (36.7 °C) (Temporal)   Resp 18   Ht 5' 3.5\" (1.613 m)   Wt 102 kg (224 lb)   SpO2 97%   BMI 39.06 kg/m²     Pain Screening:  Pain Score: 0-No pain  ECOG ECOG Performance Status: 0 - Fully active, able to carry on all pre-disease performance without restriction   Physical Exam  Constitutional:       General: She is not in acute distress.     " Appearance: Normal appearance.   HENT:      Head: Normocephalic and atraumatic.   Eyes:      General: No scleral icterus.        Right eye: No discharge.         Left eye: No discharge.      Conjunctiva/sclera: Conjunctivae normal.   Cardiovascular:      Rate and Rhythm: Normal rate and regular rhythm.   Pulmonary:      Effort: Pulmonary effort is normal. No respiratory distress.      Breath sounds: Normal breath sounds.   Abdominal:      General: Bowel sounds are normal. There is no distension.      Palpations: Abdomen is soft. There is no mass.      Tenderness: There is no abdominal tenderness.   Musculoskeletal:         General: Normal range of motion.   Lymphadenopathy:      Cervical: No cervical adenopathy.      Upper Body:      Right upper body: No supraclavicular, axillary or pectoral adenopathy.      Left upper body: No supraclavicular, axillary or pectoral adenopathy.   Skin:     General: Skin is warm and dry.   Neurological:      General: No focal deficit present.      Mental Status: She is alert and oriented to person, place, and time.   Psychiatric:         Mood and Affect: Mood normal.         Behavior: Behavior normal.         Labs: I have reviewed the following labs:  Lab Results   Component Value Date/Time    WBC 4.78 05/08/2025 04:27 PM    RBC 4.62 05/08/2025 04:27 PM    Hemoglobin 14.2 05/08/2025 04:27 PM    Hematocrit 42.5 05/08/2025 04:27 PM    MCV 92 05/08/2025 04:27 PM    MCH 30.7 05/08/2025 04:27 PM    RDW 14.0 05/08/2025 04:27 PM    Platelets 343 05/08/2025 04:27 PM    Segmented % 48 05/08/2025 04:27 PM    Lymphocytes % 32 05/08/2025 04:27 PM    Monocytes % 12 05/08/2025 04:27 PM    Eosinophils Relative 5 05/08/2025 04:27 PM    Basophils Relative 2 (H) 05/08/2025 04:27 PM    Immature Grans % 1 05/08/2025 04:27 PM    Absolute Neutrophils 2.33 05/08/2025 04:27 PM     Lab Results   Component Value Date/Time    Potassium 3.9 05/08/2025 04:27 PM    Chloride 103 05/08/2025 04:27 PM    CO2 27  05/08/2025 04:27 PM    BUN 11 05/08/2025 04:27 PM    Creatinine 0.67 05/08/2025 04:27 PM    Calcium 9.7 05/08/2025 04:27 PM    AST 58 (H) 05/08/2025 04:27 PM    ALT 48 05/08/2025 04:27 PM    Alkaline Phosphatase 96 05/08/2025 04:27 PM    Total Protein 7.5 05/08/2025 04:27 PM    Albumin 4.3 05/08/2025 04:27 PM    Total Bilirubin 0.61 05/08/2025 04:27 PM    eGFR 93 05/08/2025 04:27 PM     Lab Results   Component Value Date/Time    WBC 4.78 05/08/2025 04:27 PM    RBC 4.62 05/08/2025 04:27 PM    Hemoglobin 14.2 05/08/2025 04:27 PM    Hematocrit 42.5 05/08/2025 04:27 PM    MCV 92 05/08/2025 04:27 PM    MCH 30.7 05/08/2025 04:27 PM    RDW 14.0 05/08/2025 04:27 PM    Platelets 343 05/08/2025 04:27 PM    Segmented % 48 05/08/2025 04:27 PM    Lymphocytes % 32 05/08/2025 04:27 PM    Monocytes % 12 05/08/2025 04:27 PM    Eosinophils Relative 5 05/08/2025 04:27 PM    Basophils Relative 2 (H) 05/08/2025 04:27 PM    Immature Grans % 1 05/08/2025 04:27 PM    Absolute Neutrophils 2.33 05/08/2025 04:27 PM      Lab Results   Component Value Date/Time    Sodium 138 05/08/2025 04:27 PM    Potassium 3.9 05/08/2025 04:27 PM    Chloride 103 05/08/2025 04:27 PM    CO2 27 05/08/2025 04:27 PM    ANION GAP 8 05/08/2025 04:27 PM    BUN 11 05/08/2025 04:27 PM    Creatinine 0.67 05/08/2025 04:27 PM    Glucose 123 05/08/2025 04:27 PM    Calcium 9.7 05/08/2025 04:27 PM    AST 58 (H) 05/08/2025 04:27 PM    ALT 48 05/08/2025 04:27 PM    Alkaline Phosphatase 96 05/08/2025 04:27 PM    Total Protein 7.5 05/08/2025 04:27 PM    Albumin 4.3 05/08/2025 04:27 PM    Total Bilirubin 0.61 05/08/2025 04:27 PM    eGFR 93 05/08/2025 04:27 PM            Administrative Statements   I have spent a total time of 30 minutes in caring for this patient on the day of the visit/encounter including Diagnostic results, Instructions for management, Documenting in the medical record, Reviewing/placing orders in the medical record (including tests, medications, and/or  procedures), and Obtaining or reviewing history  .

## 2025-05-09 NOTE — ASSESSMENT & PLAN NOTE
Patient is a 64-year-old female with a history of grade 2 invasive ductal carcinoma of left breast that is ER/GA positive, HER2 negative.  She underwent lumpectomy and sentinel lymph node biopsy on 7/9/2024.  Final pathology stage pT1c pN1a cM0.  She completed a course of left breast and regional radha radiation on 10/4/2024  She is on adjuvant hormonal therapy with anastrozole    5/2/25 Diagnostic mammogram was negative for any evidence of malignancy.  She will be due for DEXA scan in 8/2026  Blood work in normal range, clinically no concerns on exam today    She will continue on anastrozole without change.  She will return for follow-up visit with medical oncology in 6 months with repeat blood work.  She is aware to call anytime with questions or concerns at any time    Orders:    CBC and differential; Future    Comprehensive metabolic panel; Future

## 2025-05-11 ENCOUNTER — RESULTS FOLLOW-UP (OUTPATIENT)
Dept: FAMILY MEDICINE CLINIC | Facility: CLINIC | Age: 65
End: 2025-05-11

## 2025-05-11 DIAGNOSIS — E78.2 MIXED HYPERLIPIDEMIA: ICD-10-CM

## 2025-05-11 DIAGNOSIS — E03.9 ACQUIRED HYPOTHYROIDISM: Primary | ICD-10-CM

## 2025-05-12 DIAGNOSIS — E03.9 HYPOTHYROIDISM, UNSPECIFIED TYPE: ICD-10-CM

## 2025-05-12 RX ORDER — LEVOTHYROXINE SODIUM 100 UG/1
100 TABLET ORAL DAILY
Qty: 100 TABLET | Refills: 1 | Status: SHIPPED | OUTPATIENT
Start: 2025-05-12

## 2025-05-14 DIAGNOSIS — R10.84 GENERALIZED ABDOMINAL PAIN: ICD-10-CM

## 2025-05-15 ENCOUNTER — TELEPHONE (OUTPATIENT)
Age: 65
End: 2025-05-15

## 2025-05-15 RX ORDER — PANTOPRAZOLE SODIUM 40 MG/1
40 TABLET, DELAYED RELEASE ORAL DAILY
Qty: 90 TABLET | Refills: 0 | OUTPATIENT
Start: 2025-05-15

## 2025-05-15 NOTE — TELEPHONE ENCOUNTER
PROVIDER Wendy SPARKS    Pt calling has OV last week 5/12 and forgot to ask about her labs.  Pt was concerned about the AST level being abnormal.  Pt does have a PCP appt next week and can also discuss with her as well.  Pt will also be discussing he HgA1C as well.    Did discuss with pt the results and all her questions were answered to her satisfaction.  Pt was appreciative of the help today.

## 2025-05-20 ENCOUNTER — TELEPHONE (OUTPATIENT)
Age: 65
End: 2025-05-20

## 2025-05-20 NOTE — TELEPHONE ENCOUNTER
Patient called in regarding lab work. She will have it drawn Thursday am in advance of her OV Friday. She will fast per protocol. She is asking about blood type testing. She will call back or discuss with PCP Friday, after she checks with her insurance for coverage, if she is interested in type and screen for blood type ordered.

## 2025-05-22 ENCOUNTER — APPOINTMENT (OUTPATIENT)
Dept: LAB | Facility: HOSPITAL | Age: 65
End: 2025-05-22
Payer: COMMERCIAL

## 2025-05-22 DIAGNOSIS — E03.9 ACQUIRED HYPOTHYROIDISM: ICD-10-CM

## 2025-05-22 DIAGNOSIS — E78.2 MIXED HYPERLIPIDEMIA: ICD-10-CM

## 2025-05-22 DIAGNOSIS — E11.9 TYPE 2 DIABETES MELLITUS WITHOUT COMPLICATION, WITHOUT LONG-TERM CURRENT USE OF INSULIN (HCC): ICD-10-CM

## 2025-05-22 LAB
CHOLEST SERPL-MCNC: 280 MG/DL (ref ?–200)
CREAT UR-MCNC: 16.9 MG/DL
HDLC SERPL-MCNC: 43 MG/DL
LDLC SERPL CALC-MCNC: 192 MG/DL (ref 0–100)
MICROALBUMIN UR-MCNC: 16.1 MG/L
MICROALBUMIN/CREAT 24H UR: 95 MG/G CREATININE (ref 0–30)
T4 FREE SERPL-MCNC: 0.9 NG/DL (ref 0.61–1.12)
TRIGL SERPL-MCNC: 224 MG/DL (ref ?–150)
TSH SERPL DL<=0.05 MIU/L-ACNC: 3.75 UIU/ML (ref 0.45–4.5)

## 2025-05-22 PROCEDURE — 82570 ASSAY OF URINE CREATININE: CPT

## 2025-05-22 PROCEDURE — 36415 COLL VENOUS BLD VENIPUNCTURE: CPT

## 2025-05-22 PROCEDURE — 84439 ASSAY OF FREE THYROXINE: CPT

## 2025-05-22 PROCEDURE — 80061 LIPID PANEL: CPT

## 2025-05-22 PROCEDURE — 84443 ASSAY THYROID STIM HORMONE: CPT

## 2025-05-22 PROCEDURE — 82043 UR ALBUMIN QUANTITATIVE: CPT

## 2025-05-23 ENCOUNTER — OFFICE VISIT (OUTPATIENT)
Dept: FAMILY MEDICINE CLINIC | Facility: CLINIC | Age: 65
End: 2025-05-23

## 2025-05-23 VITALS
OXYGEN SATURATION: 96 % | SYSTOLIC BLOOD PRESSURE: 120 MMHG | TEMPERATURE: 97.8 F | BODY MASS INDEX: 38.19 KG/M2 | WEIGHT: 219 LBS | DIASTOLIC BLOOD PRESSURE: 78 MMHG | HEART RATE: 83 BPM

## 2025-05-23 DIAGNOSIS — R10.2 SUPRAPUBIC PRESSURE: ICD-10-CM

## 2025-05-23 DIAGNOSIS — E78.2 MIXED HYPERLIPIDEMIA: ICD-10-CM

## 2025-05-23 DIAGNOSIS — K42.9 UMBILICAL HERNIA WITHOUT OBSTRUCTION AND WITHOUT GANGRENE: ICD-10-CM

## 2025-05-23 DIAGNOSIS — E11.29 TYPE 2 DIABETES MELLITUS WITH DIABETIC MICROALBUMINURIA, WITHOUT LONG-TERM CURRENT USE OF INSULIN (HCC): ICD-10-CM

## 2025-05-23 DIAGNOSIS — Z00.00 WELLNESS EXAMINATION: Primary | ICD-10-CM

## 2025-05-23 DIAGNOSIS — C50.512 MALIGNANT NEOPLASM OF LOWER-OUTER QUADRANT OF LEFT BREAST OF FEMALE, ESTROGEN RECEPTOR POSITIVE (HCC): ICD-10-CM

## 2025-05-23 DIAGNOSIS — E03.9 ACQUIRED HYPOTHYROIDISM: ICD-10-CM

## 2025-05-23 DIAGNOSIS — R80.9 TYPE 2 DIABETES MELLITUS WITH DIABETIC MICROALBUMINURIA, WITHOUT LONG-TERM CURRENT USE OF INSULIN (HCC): ICD-10-CM

## 2025-05-23 DIAGNOSIS — Z88.8 ALLERGY TO STATIN MEDICATION: ICD-10-CM

## 2025-05-23 DIAGNOSIS — R80.9 MICROALBUMINURIA: ICD-10-CM

## 2025-05-23 DIAGNOSIS — Z17.0 MALIGNANT NEOPLASM OF LOWER-OUTER QUADRANT OF LEFT BREAST OF FEMALE, ESTROGEN RECEPTOR POSITIVE (HCC): ICD-10-CM

## 2025-05-23 DIAGNOSIS — R74.01 ELEVATED AST (SGOT): ICD-10-CM

## 2025-05-23 PROBLEM — R73.03 PRE-DIABETES: Status: RESOLVED | Noted: 2017-01-04 | Resolved: 2025-05-23

## 2025-05-23 PROBLEM — U07.1 COVID-19 VIRUS INFECTION: Status: RESOLVED | Noted: 2021-04-02 | Resolved: 2025-05-23

## 2025-05-23 LAB
SL AMB  POCT GLUCOSE, UA: ABNORMAL
SL AMB LEUKOCYTE ESTERASE,UA: ABNORMAL
SL AMB POCT BILIRUBIN,UA: ABNORMAL
SL AMB POCT BLOOD,UA: ABNORMAL
SL AMB POCT CLARITY,UA: CLEAR
SL AMB POCT COLOR,UA: YELLOW
SL AMB POCT KETONES,UA: ABNORMAL
SL AMB POCT NITRITE,UA: ABNORMAL
SL AMB POCT PH,UA: 7
SL AMB POCT SPECIFIC GRAVITY,UA: 1.02
SL AMB POCT URINE PROTEIN: ABNORMAL
SL AMB POCT UROBILINOGEN: 0.2

## 2025-05-23 RX ORDER — L.ACIDOPH/L.BULG/B.BIF/S.THERM 1B-250 MG
1 TABLET ORAL DAILY
COMMUNITY

## 2025-05-23 RX ORDER — EZETIMIBE 10 MG/1
10 TABLET ORAL DAILY
Qty: 90 TABLET | Refills: 1 | Status: SHIPPED | OUTPATIENT
Start: 2025-05-23

## 2025-05-23 NOTE — ASSESSMENT & PLAN NOTE
A1c increased from 6.8 to 7.3. new microalbuminuria. Patient would like to work on lifestyle interventions to naturally improve A1c. Ref DM education. Discussed pharm options. Would typically start with metformin but with new microalbuminuria--will order jardiance---may be cost prohibitive. If too expensive, will order metformin. DM foot exam complete. Time constraints---unable to do iris exam today. Will complete at 3 month follow up visit. Patient to get A1c and CMP labs prior to appt.   Lab Results   Component Value Date    HGBA1C 7.3 (H) 05/08/2025       Orders:    Ambulatory Referral to Diabetic Education; Future    Empagliflozin 25 MG TABS; Take 1 tablet (25 mg total) by mouth daily    Comprehensive metabolic panel; Future    Hemoglobin A1c (w/out EAG); Future

## 2025-05-23 NOTE — PROGRESS NOTES
Adult Annual Physical  Name: Pascale Morgan      : 1960      MRN: [id number]  Encounter Provider: BRIDGER Delaney  Encounter Date: 2025   Encounter department: [facility]    :  Assessment & Plan  Acquired hypothyroidism  Component      Latest Ref Rng 2025   FREE T4      0.61 - 1.12 ng/dL 0.90    TSH 3RD GENERATON      0.450 - 4.500 uIU/mL 3.751      Continue levothyroxine 100 mcg.          Malignant neoplasm of lower-outer quadrant of left breast of female, estrogen receptor positive (HCC)  Following with oncology.        Type 2 diabetes mellitus with diabetic microalbuminuria, without long-term current use of insulin (HCC)  A1c increased from 6.8 to 7.3. new microalbuminuria. Patient would like to work on lifestyle interventions to naturally improve A1c. Ref DM education. Discussed pharm options. Would typically start with metformin but with new microalbuminuria--will order jardiance---may be cost prohibitive. If too expensive, will order metformin. DM foot exam complete. Time constraints---unable to do iris exam today. Will complete at 3 month follow up visit. Patient to get A1c and CMP labs prior to appt.   Lab Results   Component Value Date    HGBA1C 7.3 (H) 2025       Orders:    Ambulatory Referral to Diabetic Education; Future    Empagliflozin 25 MG TABS; Take 1 tablet (25 mg total) by mouth daily    Comprehensive metabolic panel; Future    Hemoglobin A1c (w/out EAG); Future    Mixed hyperlipidemia  Component      Latest Ref Rng 2025   Cholesterol      See Comment mg/dL 280 (H)    Triglycerides      See Comment mg/dL 224 (H)    HDL      >=50 mg/dL 43 (L)    LDL Calculated      0 - 100 mg/dL 192 (H)       Allergy to statins---hives. States that she was on zetia previously but discontinued it. Did not have any known ill effects. Will restart zetia.   Orders:    ezetimibe (ZETIA) 10 mg tablet; Take 1 tablet (10 mg total) by mouth daily     "Lipid Panel with Direct LDL reflex; Future    Allergy to statin medication  See mixed hyperlipidemia note.        Microalbuminuria  Component      Latest Ref Rng 5/22/2025   EXT Creatinine Urine      Reference range not established. mg/dL 16.9    Albumin,U,Random      <20.0 mg/L 16.1    Albumin Creat Ratio      0 - 30 mg/g creatinine 95 (H)              Suprapubic pressure  Patient reports that she was away with friends recently. States that she started with suprapubic pressure/heaviness especially with walking. Has had 1 episode of incontinence. States, \"But I woke up and my cat was laying directly on my bladder. I realized I wet myself. That is the only time that has ever happened.\" POCT dip neg for evidence of infection. +trace-intact blood. Will send for microscopic. Ref urogyn.  Orders:    POCT urine dip    Ambulatory Referral to Urogynecology; Future    Urinalysis with microscopic    Umbilical hernia without obstruction and without gangrene  9/4/24 CT--ABDOMINAL WALL/INGUINAL REGIONS: Small fat-containing umbilical hernia.     Was concerned this was causing suprapubic discomfort. Discussed that they are likely unrelated.       Elevated AST (SGOT)  Component      Latest Ref Rng 5/8/2025   AST      13 - 39 U/L 58 (H)       Trend CMP. Repeat in 3 months.        Wellness examination             Preventive Screenings:  - Diabetes Screening: screening not indicated and has diabetes  - Cholesterol Screening: screening not indicated and has hyperlipidemia   - Cervical cancer screening: screening up-to-date   - Breast cancer screening: has history of breast cancer   - Colon cancer screening: screening up-to-date   - Lung cancer screening: screening not indicated     Immunizations:  - Immunizations due: Prevnar 20, Tdap and Zoster (Shingrix)    Counseling/Anticipatory Guidance:    - Dental health: discussed importance of regular tooth brushing, flossing, and dental visits.   - Diet: discussed recommendations for a " healthy/well-balanced diet.   - Exercise: the importance of regular exercise/physical activity was discussed. Recommend exercise 3-5 times per week for at least 30 minutes.       Depression Screening and Follow-up Plan: Patient was screened for depression during today's encounter. They screened negative with a PHQ-2 score of 0.          History of Present Illness     Adult Annual Physical:  Patient presents for annual physical.     Diet and Physical Activity:  - Diet/Nutrition: poor diet.  - Exercise: moderate cardiovascular exercise.    Depression Screening:  - PHQ-2 Score: 0    General Health:  - Sleep: 4-6 hours of sleep on average.  - Hearing: normal hearing bilateral ears.  - Vision: most recent eye exam > 1 year ago and wears glasses.  - Dental: regular dental visits.    /GYN Health:    - Menopause: postmenopausal.     Advanced Care Planning:  - Has an advanced directive?: no    - Has a durable medical POA?: no    - ACP document given to patient?: yes      Review of Systems   Constitutional:  Negative for activity change, fatigue and fever.   HENT:  Negative for congestion, ear pain, rhinorrhea and sore throat.    Eyes:  Negative for pain.   Respiratory:  Negative for cough, shortness of breath and wheezing.    Cardiovascular:  Negative for chest pain and leg swelling.   Gastrointestinal:  Negative for abdominal pain, diarrhea, nausea and vomiting.   Genitourinary:         Suprapubic discomfort   Musculoskeletal:  Negative for arthralgias and myalgias.   Skin:  Negative for rash.   Neurological:  Negative for dizziness, weakness and numbness.   Psychiatric/Behavioral:  Negative for dysphoric mood and suicidal ideas. The patient is nervous/anxious.         Stress   All other systems reviewed and are negative.    Medical History Reviewed by provider this encounter:  Tobacco  Allergies  Meds  Problems  Med Hx  Surg Hx  Fam Hx     .  Medications Ordered Prior to Encounter[1]     Objective   /78 (BP  Location: Right arm, Patient Position: Sitting, Cuff Size: Standard) Comment (BP Location): pt had lumpectomy on left breast, do bp on right arm  Pulse 83   Temp 97.8 °F (36.6 °C) (Tympanic)   Wt 99.3 kg (219 lb)   SpO2 96%   BMI 38.19 kg/m²     Physical Exam  Vitals and nursing note reviewed.   Constitutional:       General: She is not in acute distress.     Appearance: She is well-developed.   HENT:      Head: Normocephalic and atraumatic.      Right Ear: Tympanic membrane, ear canal and external ear normal. There is no impacted cerumen.      Left Ear: Tympanic membrane, ear canal and external ear normal. There is no impacted cerumen.      Mouth/Throat:      Mouth: Mucous membranes are moist.      Pharynx: No oropharyngeal exudate.     Cardiovascular:      Rate and Rhythm: Normal rate and regular rhythm.      Pulses: no weak pulses.           Dorsalis pedis pulses are 2+ on the right side and 2+ on the left side.        Posterior tibial pulses are 2+ on the right side and 2+ on the left side.      Heart sounds: Normal heart sounds. No murmur heard.  Pulmonary:      Effort: Pulmonary effort is normal. No respiratory distress.      Breath sounds: Normal breath sounds. No wheezing.   Abdominal:      General: There is no distension.      Palpations: Abdomen is soft.      Tenderness: There is no abdominal tenderness.     Musculoskeletal:      Cervical back: Neck supple.   Feet:      Right foot:      Skin integrity: Dry skin present. No ulcer, skin breakdown, erythema, warmth or callus.      Left foot:      Skin integrity: Dry skin present. No ulcer, skin breakdown, erythema, warmth or callus.     Skin:     General: Skin is warm and dry.     Neurological:      Mental Status: She is alert and oriented to person, place, and time. Mental status is at baseline.     Psychiatric:         Mood and Affect: Mood normal.         Behavior: Behavior normal.     Diabetic Foot Exam    Patient's shoes and socks removed.    Right  Foot/Ankle   Right Foot Inspection  Skin Exam: skin normal, skin intact and dry skin. No warmth, no callus, no erythema, no maceration, no abnormal color, no pre-ulcer, no ulcer and no callus.     Toe Exam: ROM and strength within normal limits.     Sensory   Vibration: intact  Monofilament testing: intact    Vascular  Capillary refills: < 3 seconds  The right DP pulse is 2+. The right PT pulse is 2+.     Left Foot/Ankle  Left Foot Inspection  Skin Exam: skin normal, skin intact and dry skin. No warmth, no erythema, no maceration, normal color, no pre-ulcer, no ulcer and no callus.     Toe Exam: ROM and strength within normal limits.     Sensory   Vibration: intact  Monofilament testing: intact    Vascular  Capillary refills: < 3 seconds  The left DP pulse is 2+. The left PT pulse is 2+.     Assign Risk Category  No deformity present  No loss of protective sensation  No weak pulses  Risk: 0    Administrative Statements   I have spent a total time of 30 minutes in caring for this patient on the day of the visit/encounter including Diagnostic results, Risks and benefits of tx options, Instructions for management, Patient and family education, Importance of tx compliance, Risk factor reductions, Impressions, Documenting in the medical record, Reviewing/placing orders in the medical record (including tests, medications, and/or procedures), and Obtaining or reviewing history  .         [1]   Current Outpatient Medications on File Prior to Visit   Medication Sig Dispense Refill    anastrozole (ARIMIDEX) 1 mg tablet Take 1 tablet (1 mg total) by mouth daily 90 tablet 3    Bioflavonoid Products (CHAYO C PO) Take 1,000 mg by mouth in the morning      Garlic (GARLIQUE PO) Take 1 caplet by mouth in the morning      CRISTOPHER MAGNESIUM-POTASSIUM PO Take 1 tablet by mouth in the morning      levothyroxine 100 mcg tablet Take 1 tablet (100 mcg total) by mouth daily 100 tablet 1    Misc Natural Products (COMPLETE LIVER CLEANSE PO)  Take 2 capsules by mouth in the morning      pantoprazole (PROTONIX) 40 mg tablet Take 1 tablet (40 mg total) by mouth daily 90 tablet 1    Probiotic TABS Take 1 tablet by mouth in the morning      [DISCONTINUED] polyethylene glycol (GLYCOLAX) 17 GM/SCOOP powder Take 17 g by mouth daily (Patient not taking: Reported on 6/10/2024) 255 g 0     No current facility-administered medications on file prior to visit.

## 2025-05-23 NOTE — ASSESSMENT & PLAN NOTE
Component      Latest Ref Rng 5/22/2025   FREE T4      0.61 - 1.12 ng/dL 0.90    TSH 3RD GENERATON      0.450 - 4.500 uIU/mL 3.751      Continue levothyroxine 100 mcg.

## 2025-05-23 NOTE — ASSESSMENT & PLAN NOTE
Component      Latest Ref Rng 5/22/2025   EXT Creatinine Urine      Reference range not established. mg/dL 16.9    Albumin,U,Random      <20.0 mg/L 16.1    Albumin Creat Ratio      0 - 30 mg/g creatinine 95 (H)

## 2025-05-23 NOTE — ASSESSMENT & PLAN NOTE
Component      Latest Ref Rng 5/22/2025   Cholesterol      See Comment mg/dL 280 (H)    Triglycerides      See Comment mg/dL 224 (H)    HDL      >=50 mg/dL 43 (L)    LDL Calculated      0 - 100 mg/dL 192 (H)       Allergy to statins---hives. States that she was on zetia previously but discontinued it. Did not have any known ill effects. Will restart zetia.   Orders:    ezetimibe (ZETIA) 10 mg tablet; Take 1 tablet (10 mg total) by mouth daily    Lipid Panel with Direct LDL reflex; Future

## 2025-05-24 ENCOUNTER — RESULTS FOLLOW-UP (OUTPATIENT)
Dept: FAMILY MEDICINE CLINIC | Facility: CLINIC | Age: 65
End: 2025-05-24

## 2025-05-24 LAB
APPEARANCE UR: CLEAR
BACTERIA URNS QL MICRO: NORMAL
BILIRUB UR QL STRIP: NEGATIVE
CASTS URNS QL MICRO: NORMAL /LPF
COLOR UR: YELLOW
EPI CELLS #/AREA URNS HPF: NORMAL /HPF (ref 0–10)
GLUCOSE UR QL: NEGATIVE
HGB UR QL STRIP: NEGATIVE
KETONES UR QL STRIP: NEGATIVE
LEUKOCYTE ESTERASE UR QL STRIP: NEGATIVE
MICRO URNS: NORMAL
MICRO URNS: NORMAL
NITRITE UR QL STRIP: NEGATIVE
PH UR STRIP: 6.5 [PH] (ref 5–7.5)
PROT UR QL STRIP: NEGATIVE
RBC #/AREA URNS HPF: NORMAL /HPF (ref 0–2)
SP GR UR: 1.01 (ref 1–1.03)
UROBILINOGEN UR STRIP-ACNC: 0.2 MG/DL (ref 0.2–1)
WBC #/AREA URNS HPF: NORMAL /HPF (ref 0–5)

## 2025-08-05 ENCOUNTER — PATIENT MESSAGE (OUTPATIENT)
Dept: FAMILY MEDICINE CLINIC | Facility: CLINIC | Age: 65
End: 2025-08-05

## 2025-08-05 ENCOUNTER — TELEPHONE (OUTPATIENT)
Age: 65
End: 2025-08-05

## 2025-08-13 ENCOUNTER — APPOINTMENT (OUTPATIENT)
Dept: LAB | Facility: HOSPITAL | Age: 65
End: 2025-08-13
Payer: COMMERCIAL

## 2025-08-13 DIAGNOSIS — E78.2 MIXED HYPERLIPIDEMIA: ICD-10-CM

## 2025-08-13 DIAGNOSIS — E11.29 TYPE 2 DIABETES MELLITUS WITH DIABETIC MICROALBUMINURIA, WITHOUT LONG-TERM CURRENT USE OF INSULIN (HCC): ICD-10-CM

## 2025-08-13 DIAGNOSIS — R80.9 TYPE 2 DIABETES MELLITUS WITH DIABETIC MICROALBUMINURIA, WITHOUT LONG-TERM CURRENT USE OF INSULIN (HCC): ICD-10-CM

## 2025-08-13 LAB
ALBUMIN SERPL BCG-MCNC: 4.4 G/DL (ref 3.5–5)
ALP SERPL-CCNC: 102 U/L (ref 34–104)
ALT SERPL W P-5'-P-CCNC: 44 U/L (ref 7–52)
ANION GAP SERPL CALCULATED.3IONS-SCNC: 10 MMOL/L (ref 4–13)
AST SERPL W P-5'-P-CCNC: 48 U/L (ref 13–39)
BILIRUB SERPL-MCNC: 0.76 MG/DL (ref 0.2–1)
BUN SERPL-MCNC: 10 MG/DL (ref 5–25)
CALCIUM SERPL-MCNC: 10 MG/DL (ref 8.4–10.2)
CHLORIDE SERPL-SCNC: 104 MMOL/L (ref 96–108)
CHOLEST SERPL-MCNC: 226 MG/DL (ref ?–200)
CO2 SERPL-SCNC: 25 MMOL/L (ref 21–32)
CREAT SERPL-MCNC: 0.67 MG/DL (ref 0.6–1.3)
EST. AVERAGE GLUCOSE BLD GHB EST-MCNC: 151 MG/DL
GFR SERPL CREATININE-BSD FRML MDRD: 93 ML/MIN/1.73SQ M
GLUCOSE SERPL-MCNC: 121 MG/DL (ref 65–140)
HBA1C MFR BLD: 6.9 %
HDLC SERPL-MCNC: 45 MG/DL
LDLC SERPL CALC-MCNC: 134 MG/DL (ref 0–100)
POTASSIUM SERPL-SCNC: 4.3 MMOL/L (ref 3.5–5.3)
PROT SERPL-MCNC: 7.5 G/DL (ref 6.4–8.4)
SODIUM SERPL-SCNC: 139 MMOL/L (ref 135–147)
TRIGL SERPL-MCNC: 237 MG/DL (ref ?–150)

## 2025-08-13 PROCEDURE — 80061 LIPID PANEL: CPT

## 2025-08-13 PROCEDURE — 83036 HEMOGLOBIN GLYCOSYLATED A1C: CPT

## 2025-08-13 PROCEDURE — 36415 COLL VENOUS BLD VENIPUNCTURE: CPT

## 2025-08-13 PROCEDURE — 80053 COMPREHEN METABOLIC PANEL: CPT

## 2025-08-15 ENCOUNTER — TELEPHONE (OUTPATIENT)
Age: 65
End: 2025-08-15

## 2025-08-15 ENCOUNTER — OFFICE VISIT (OUTPATIENT)
Dept: FAMILY MEDICINE CLINIC | Facility: CLINIC | Age: 65
End: 2025-08-15
Payer: COMMERCIAL

## 2025-08-15 ENCOUNTER — APPOINTMENT (OUTPATIENT)
Dept: LAB | Facility: HOSPITAL | Age: 65
End: 2025-08-15
Payer: COMMERCIAL

## 2025-08-15 VITALS
BODY MASS INDEX: 37.92 KG/M2 | SYSTOLIC BLOOD PRESSURE: 126 MMHG | DIASTOLIC BLOOD PRESSURE: 64 MMHG | HEIGHT: 63 IN | WEIGHT: 214 LBS

## 2025-08-15 DIAGNOSIS — R42 DIZZINESS: Primary | ICD-10-CM

## 2025-08-15 DIAGNOSIS — E11.29 TYPE 2 DIABETES MELLITUS WITH DIABETIC MICROALBUMINURIA, WITHOUT LONG-TERM CURRENT USE OF INSULIN (HCC): ICD-10-CM

## 2025-08-15 DIAGNOSIS — R80.9 TYPE 2 DIABETES MELLITUS WITH DIABETIC MICROALBUMINURIA, WITHOUT LONG-TERM CURRENT USE OF INSULIN (HCC): ICD-10-CM

## 2025-08-15 DIAGNOSIS — E78.2 MIXED HYPERLIPIDEMIA: ICD-10-CM

## 2025-08-15 DIAGNOSIS — C50.512 MALIGNANT NEOPLASM OF LOWER-OUTER QUADRANT OF LEFT BREAST OF FEMALE, ESTROGEN RECEPTOR POSITIVE (HCC): ICD-10-CM

## 2025-08-15 DIAGNOSIS — Z17.0 MALIGNANT NEOPLASM OF LOWER-OUTER QUADRANT OF LEFT BREAST OF FEMALE, ESTROGEN RECEPTOR POSITIVE (HCC): ICD-10-CM

## 2025-08-15 DIAGNOSIS — Z23 ENCOUNTER FOR IMMUNIZATION: ICD-10-CM

## 2025-08-15 LAB
LEFT EYE DIABETIC RETINOPATHY: NORMAL
LEFT EYE IMAGE QUALITY: NORMAL
LEFT EYE MACULAR EDEMA: NORMAL
LEFT EYE OTHER RETINOPATHY: NORMAL
RIGHT EYE DIABETIC RETINOPATHY: NORMAL
RIGHT EYE IMAGE QUALITY: NORMAL
RIGHT EYE MACULAR EDEMA: NORMAL
RIGHT EYE OTHER RETINOPATHY: NORMAL
SEVERITY (EYE EXAM): NORMAL

## 2025-08-15 PROCEDURE — 99214 OFFICE O/P EST MOD 30 MIN: CPT

## 2025-08-19 ENCOUNTER — HOSPITAL ENCOUNTER (OUTPATIENT)
Dept: MRI IMAGING | Facility: HOSPITAL | Age: 65
Discharge: HOME | End: 2025-08-19
Payer: COMMERCIAL

## 2025-08-19 DIAGNOSIS — R79.82 ELEVATED C-REACTIVE PROTEIN (CRP): Primary | ICD-10-CM

## 2025-08-19 DIAGNOSIS — R42 DIZZINESS: ICD-10-CM

## 2025-08-19 PROCEDURE — 70553 MRI BRAIN STEM W/O & W/DYE: CPT

## 2025-08-19 PROCEDURE — A9585 GADOBUTROL INJECTION: HCPCS

## 2025-08-19 RX ORDER — GADOBUTROL 604.72 MG/ML
9 INJECTION INTRAVENOUS
Status: COMPLETED | OUTPATIENT
Start: 2025-08-19 | End: 2025-08-19

## 2025-08-19 RX ADMIN — GADOBUTROL 9 ML: 604.72 INJECTION INTRAVENOUS at 10:36

## (undated) DEVICE — INTENDED FOR TISSUE SEPARATION, AND OTHER PROCEDURES THAT REQUIRE A SHARP SURGICAL BLADE TO PUNCTURE OR CUT.: Brand: BARD-PARKER SAFETY BLADES SIZE 15, STERILE

## (undated) DEVICE — PROVE COVER: Brand: UNBRANDED

## (undated) DEVICE — BETHLEHEM UNIVERSAL MINOR GEN: Brand: CARDINAL HEALTH

## (undated) DEVICE — 2000CC GUARDIAN II: Brand: GUARDIAN

## (undated) DEVICE — BRA SURGICAL SZ MED (33-36)

## (undated) DEVICE — SUT MONOCRYL 4-0 PS-2 27 IN Y426H

## (undated) DEVICE — SCD SEQUENTIAL COMPRESSION COMFORT SLEEVE MEDIUM KNEE LENGTH: Brand: KENDALL SCD

## (undated) DEVICE — SHEATH, GUIDE, SAVI SCOUT®: Brand: SAVI SCOUT®

## (undated) DEVICE — GAUZE SPONGES,16 PLY: Brand: CURITY

## (undated) DEVICE — SUT MONOCRYL 3-0 SH 27 IN Y416H

## (undated) DEVICE — MEDI-VAC YANKAUER SUCTION HANDLE W/BULBOUS AND CONTROL VENT: Brand: CARDINAL HEALTH

## (undated) DEVICE — 4-PORT MANIFOLD: Brand: NEPTUNE 2

## (undated) DEVICE — NEEDLE 25G X 1 1/2

## (undated) DEVICE — HEMOSTAT POWDER ADSORB SURGICEL 3GM

## (undated) DEVICE — SUT SILK 2-0 SH 30 IN K833H

## (undated) DEVICE — LIGACLIP MCA MULTIPLE CLIP APPLIERS, 20 SMALL CLIPS: Brand: LIGACLIP

## (undated) DEVICE — GLOVE SRG BIOGEL 6

## (undated) DEVICE — PLUMEPEN PRO 10FT

## (undated) DEVICE — CHLORAPREP HI-LITE 26ML ORANGE

## (undated) DEVICE — EXOFIN PRECISION PEN HIGH VISCOSITY TOPICAL SKIN ADHESIVE: Brand: EXOFIN PRECISION PEN, 1G

## (undated) DEVICE — TUBING SUCTION 5MM X 12 FT

## (undated) DEVICE — SUPER SPONGES,MEDIUM: Brand: KERLIX

## (undated) DEVICE — DRAPE SHEET THREE QUARTER

## (undated) DEVICE — DRAPE EQUIPMENT RF WAND